# Patient Record
Sex: MALE | Race: WHITE | Employment: FULL TIME | URBAN - METROPOLITAN AREA
[De-identification: names, ages, dates, MRNs, and addresses within clinical notes are randomized per-mention and may not be internally consistent; named-entity substitution may affect disease eponyms.]

---

## 2018-02-21 LAB
CHOLEST SERPL-MCNC: 230 MG/DL (ref 100–199)
CHOLEST/HDLC SERPL: 4.8 {RATIO} (ref 0–5)
GLUCOSE SERPL-MCNC: 94 MG/DL (ref 65–99)
HBA1C MFR BLD: 5.4 % (ref 4.8–5.6)
HDLC SERPL-MCNC: 48 MG/DL
LDLC SERPL CALC-MCNC: 149 MG/DL (ref 0–99)
TRIGL SERPL-MCNC: 164 MG/DL (ref 0–149)
VLDLC SERPL CALC-MCNC: 33 MG/DL (ref 5–40)

## 2021-09-20 ENCOUNTER — APPOINTMENT (OUTPATIENT)
Dept: CT IMAGING | Age: 46
DRG: 177 | End: 2021-09-20
Attending: EMERGENCY MEDICINE
Payer: COMMERCIAL

## 2021-09-20 ENCOUNTER — APPOINTMENT (OUTPATIENT)
Dept: GENERAL RADIOLOGY | Age: 46
DRG: 177 | End: 2021-09-20
Attending: STUDENT IN AN ORGANIZED HEALTH CARE EDUCATION/TRAINING PROGRAM
Payer: COMMERCIAL

## 2021-09-20 ENCOUNTER — HOSPITAL ENCOUNTER (INPATIENT)
Age: 46
LOS: 1 days | Discharge: ANOTHER HOSPITAL | DRG: 177 | End: 2021-09-21
Attending: STUDENT IN AN ORGANIZED HEALTH CARE EDUCATION/TRAINING PROGRAM | Admitting: INTERNAL MEDICINE
Payer: COMMERCIAL

## 2021-09-20 DIAGNOSIS — R09.02 HYPOXIA: Primary | ICD-10-CM

## 2021-09-20 DIAGNOSIS — Z20.822 SUSPECTED COVID-19 VIRUS INFECTION: ICD-10-CM

## 2021-09-20 PROBLEM — J96.01 ACUTE RESPIRATORY FAILURE WITH HYPOXIA (HCC): Status: ACTIVE | Noted: 2021-09-20

## 2021-09-20 LAB
ALBUMIN SERPL-MCNC: 3.2 G/DL (ref 3.5–5)
ALBUMIN/GLOB SERPL: 0.7 {RATIO} (ref 1.2–3.5)
ALP SERPL-CCNC: 94 U/L (ref 50–136)
ALT SERPL-CCNC: 29 U/L (ref 12–65)
ANION GAP SERPL CALC-SCNC: 8 MMOL/L (ref 7–16)
AST SERPL-CCNC: 56 U/L (ref 15–37)
BASOPHILS # BLD: 0 K/UL (ref 0–0.2)
BASOPHILS NFR BLD: 0 % (ref 0–2)
BILIRUB SERPL-MCNC: 0.5 MG/DL (ref 0.2–1.1)
BUN SERPL-MCNC: 13 MG/DL (ref 6–23)
CALCIUM SERPL-MCNC: 8.6 MG/DL (ref 8.3–10.4)
CHLORIDE SERPL-SCNC: 101 MMOL/L (ref 98–107)
CO2 SERPL-SCNC: 29 MMOL/L (ref 21–32)
COVID-19 RAPID TEST, COVR: NOT DETECTED
CREAT SERPL-MCNC: 0.7 MG/DL (ref 0.8–1.5)
DIFFERENTIAL METHOD BLD: ABNORMAL
EOSINOPHIL # BLD: 0 K/UL (ref 0–0.8)
EOSINOPHIL NFR BLD: 0 % (ref 0.5–7.8)
ERYTHROCYTE [DISTWIDTH] IN BLOOD BY AUTOMATED COUNT: 13.2 % (ref 11.9–14.6)
GLOBULIN SER CALC-MCNC: 4.4 G/DL (ref 2.3–3.5)
GLUCOSE SERPL-MCNC: 119 MG/DL (ref 65–100)
HCT VFR BLD AUTO: 49 % (ref 41.1–50.3)
HGB BLD-MCNC: 16.3 G/DL (ref 13.6–17.2)
IMM GRANULOCYTES # BLD AUTO: 0 K/UL (ref 0–0.5)
IMM GRANULOCYTES NFR BLD AUTO: 0 % (ref 0–5)
LYMPHOCYTES # BLD: 0.8 K/UL (ref 0.5–4.6)
LYMPHOCYTES NFR BLD: 9 % (ref 13–44)
MCH RBC QN AUTO: 28.1 PG (ref 26.1–32.9)
MCHC RBC AUTO-ENTMCNC: 33.3 G/DL (ref 31.4–35)
MCV RBC AUTO: 84.5 FL (ref 79.6–97.8)
MONOCYTES # BLD: 0.8 K/UL (ref 0.1–1.3)
MONOCYTES NFR BLD: 10 % (ref 4–12)
NEUTS SEG # BLD: 6.6 K/UL (ref 1.7–8.2)
NEUTS SEG NFR BLD: 80 % (ref 43–78)
NRBC # BLD: 0 K/UL (ref 0–0.2)
PLATELET # BLD AUTO: 218 K/UL (ref 150–450)
PMV BLD AUTO: 9.6 FL (ref 9.4–12.3)
POTASSIUM SERPL-SCNC: 3.8 MMOL/L (ref 3.5–5.1)
PROT SERPL-MCNC: 7.6 G/DL (ref 6.3–8.2)
RBC # BLD AUTO: 5.8 M/UL (ref 4.23–5.6)
SODIUM SERPL-SCNC: 138 MMOL/L (ref 136–145)
SOURCE, COVRS: NORMAL
WBC # BLD AUTO: 8.2 K/UL (ref 4.3–11.1)

## 2021-09-20 PROCEDURE — 85025 COMPLETE CBC W/AUTO DIFF WBC: CPT

## 2021-09-20 PROCEDURE — 99284 EMERGENCY DEPT VISIT MOD MDM: CPT

## 2021-09-20 PROCEDURE — 71046 X-RAY EXAM CHEST 2 VIEWS: CPT

## 2021-09-20 PROCEDURE — 86140 C-REACTIVE PROTEIN: CPT

## 2021-09-20 PROCEDURE — 80053 COMPREHEN METABOLIC PANEL: CPT

## 2021-09-20 PROCEDURE — 84145 PROCALCITONIN (PCT): CPT

## 2021-09-20 PROCEDURE — 74011250636 HC RX REV CODE- 250/636: Performed by: STUDENT IN AN ORGANIZED HEALTH CARE EDUCATION/TRAINING PROGRAM

## 2021-09-20 PROCEDURE — 65270000029 HC RM PRIVATE

## 2021-09-20 PROCEDURE — 74011000636 HC RX REV CODE- 636: Performed by: STUDENT IN AN ORGANIZED HEALTH CARE EDUCATION/TRAINING PROGRAM

## 2021-09-20 PROCEDURE — 0202U NFCT DS 22 TRGT SARS-COV-2: CPT

## 2021-09-20 PROCEDURE — 71260 CT THORAX DX C+: CPT

## 2021-09-20 PROCEDURE — 87635 SARS-COV-2 COVID-19 AMP PRB: CPT

## 2021-09-20 PROCEDURE — 74011000258 HC RX REV CODE- 258: Performed by: STUDENT IN AN ORGANIZED HEALTH CARE EDUCATION/TRAINING PROGRAM

## 2021-09-20 RX ORDER — SODIUM CHLORIDE 0.9 % (FLUSH) 0.9 %
10 SYRINGE (ML) INJECTION
Status: COMPLETED | OUTPATIENT
Start: 2021-09-20 | End: 2021-09-20

## 2021-09-20 RX ORDER — DEXAMETHASONE SODIUM PHOSPHATE 100 MG/10ML
10 INJECTION INTRAMUSCULAR; INTRAVENOUS
Status: COMPLETED | OUTPATIENT
Start: 2021-09-20 | End: 2021-09-20

## 2021-09-20 RX ORDER — SODIUM CHLORIDE 9 MG/ML
1000 INJECTION, SOLUTION INTRAVENOUS ONCE
Status: COMPLETED | OUTPATIENT
Start: 2021-09-20 | End: 2021-09-20

## 2021-09-20 RX ADMIN — SODIUM CHLORIDE 100 ML: 900 INJECTION, SOLUTION INTRAVENOUS at 20:17

## 2021-09-20 RX ADMIN — SODIUM CHLORIDE 1000 ML: 900 INJECTION, SOLUTION INTRAVENOUS at 21:38

## 2021-09-20 RX ADMIN — DEXAMETHASONE SODIUM PHOSPHATE 10 MG: 10 INJECTION INTRAMUSCULAR; INTRAVENOUS at 22:30

## 2021-09-20 RX ADMIN — Medication 10 ML: at 20:17

## 2021-09-20 RX ADMIN — IOPAMIDOL 100 ML: 755 INJECTION, SOLUTION INTRAVENOUS at 20:17

## 2021-09-20 NOTE — ED TRIAGE NOTES
Patient is concerned for COVID 19, patient advises that he started with some shortness of breath on 9/16, fever. Patient advises it hurts to take a deep breath however none if he tried to take shallow breaths. Mask on during triage.

## 2021-09-20 NOTE — ED NOTES
40 y.o. M with concern for covid and SOB. Sx onset last Wednesday but SOB began Thursday. [facility] but has not received result. SpO2 90-92% in triage. No medical hx. Patient evaluated initially in triage. Rapid Medical Evaluation was conducted and necessary orders have been placed. I have performed a medical screening exam.  Care will now be transferred to the attending physician in the emergency department.   Overton Eisenmenger, NP 6:08 PM

## 2021-09-21 ENCOUNTER — HOSPITAL ENCOUNTER (INPATIENT)
Age: 46
LOS: 14 days | Discharge: HOME | DRG: 177 | End: 2021-10-05
Attending: FAMILY MEDICINE | Admitting: HOSPITALIST
Payer: COMMERCIAL

## 2021-09-21 VITALS
BODY MASS INDEX: 34.07 KG/M2 | TEMPERATURE: 98.8 F | SYSTOLIC BLOOD PRESSURE: 127 MMHG | OXYGEN SATURATION: 92 % | DIASTOLIC BLOOD PRESSURE: 72 MMHG | RESPIRATION RATE: 20 BRPM | HEIGHT: 69 IN | HEART RATE: 99 BPM | WEIGHT: 230 LBS

## 2021-09-21 DIAGNOSIS — F41.9 ANXIETY: Primary | ICD-10-CM

## 2021-09-21 DIAGNOSIS — U07.1 PNEUMONIA DUE TO COVID-19 VIRUS: ICD-10-CM

## 2021-09-21 DIAGNOSIS — R00.0 TACHYCARDIA: ICD-10-CM

## 2021-09-21 DIAGNOSIS — J96.01 ACUTE RESPIRATORY FAILURE WITH HYPOXIA (HCC): ICD-10-CM

## 2021-09-21 DIAGNOSIS — J12.82 PNEUMONIA DUE TO COVID-19 VIRUS: ICD-10-CM

## 2021-09-21 LAB
B PERT DNA SPEC QL NAA+PROBE: NOT DETECTED
BORDETELLA PARAPERTUSSIS PCR, BORPAR: NOT DETECTED
C PNEUM DNA SPEC QL NAA+PROBE: NOT DETECTED
CRP SERPL-MCNC: 19.9 MG/DL (ref 0–0.9)
FLUAV SUBTYP SPEC NAA+PROBE: NOT DETECTED
FLUBV RNA SPEC QL NAA+PROBE: NOT DETECTED
HADV DNA SPEC QL NAA+PROBE: NOT DETECTED
HCOV 229E RNA SPEC QL NAA+PROBE: NOT DETECTED
HCOV HKU1 RNA SPEC QL NAA+PROBE: NOT DETECTED
HCOV NL63 RNA SPEC QL NAA+PROBE: NOT DETECTED
HCOV OC43 RNA SPEC QL NAA+PROBE: NOT DETECTED
HMPV RNA SPEC QL NAA+PROBE: NOT DETECTED
HPIV1 RNA SPEC QL NAA+PROBE: NOT DETECTED
HPIV2 RNA SPEC QL NAA+PROBE: NOT DETECTED
HPIV3 RNA SPEC QL NAA+PROBE: NOT DETECTED
HPIV4 RNA SPEC QL NAA+PROBE: NOT DETECTED
M PNEUMO DNA SPEC QL NAA+PROBE: NOT DETECTED
PROCALCITONIN SERPL-MCNC: <0.05 NG/ML
PROCALCITONIN SERPL-MCNC: <0.05 NG/ML
RSV RNA SPEC QL NAA+PROBE: NOT DETECTED
RV+EV RNA SPEC QL NAA+PROBE: NOT DETECTED
SARS-COV-2 PCR, COVPCR: DETECTED

## 2021-09-21 PROCEDURE — 36415 COLL VENOUS BLD VENIPUNCTURE: CPT

## 2021-09-21 PROCEDURE — 65270000029 HC RM PRIVATE

## 2021-09-21 PROCEDURE — 74011000258 HC RX REV CODE- 258: Performed by: HOSPITALIST

## 2021-09-21 PROCEDURE — 74011000250 HC RX REV CODE- 250: Performed by: HOSPITALIST

## 2021-09-21 PROCEDURE — XW033E5 INTRODUCTION OF REMDESIVIR ANTI-INFECTIVE INTO PERIPHERAL VEIN, PERCUTANEOUS APPROACH, NEW TECHNOLOGY GROUP 5: ICD-10-PCS | Performed by: INTERNAL MEDICINE

## 2021-09-21 PROCEDURE — 74011250636 HC RX REV CODE- 250/636: Performed by: INTERNAL MEDICINE

## 2021-09-21 PROCEDURE — 84145 PROCALCITONIN (PCT): CPT

## 2021-09-21 PROCEDURE — XW033E5 INTRODUCTION OF REMDESIVIR ANTI-INFECTIVE INTO PERIPHERAL VEIN, PERCUTANEOUS APPROACH, NEW TECHNOLOGY GROUP 5: ICD-10-PCS | Performed by: HOSPITALIST

## 2021-09-21 PROCEDURE — 74011250637 HC RX REV CODE- 250/637: Performed by: INTERNAL MEDICINE

## 2021-09-21 PROCEDURE — 74011000250 HC RX REV CODE- 250: Performed by: INTERNAL MEDICINE

## 2021-09-21 PROCEDURE — 74011000258 HC RX REV CODE- 258: Performed by: INTERNAL MEDICINE

## 2021-09-21 PROCEDURE — 74011250637 HC RX REV CODE- 250/637: Performed by: HOSPITALIST

## 2021-09-21 RX ORDER — DEXAMETHASONE 4 MG/1
6 TABLET ORAL DAILY
Status: DISCONTINUED | OUTPATIENT
Start: 2021-09-21 | End: 2021-09-21 | Stop reason: HOSPADM

## 2021-09-21 RX ORDER — ACETAMINOPHEN 650 MG/1
650 SUPPOSITORY RECTAL
Status: DISCONTINUED | OUTPATIENT
Start: 2021-09-21 | End: 2021-09-21 | Stop reason: HOSPADM

## 2021-09-21 RX ORDER — ENOXAPARIN SODIUM 100 MG/ML
30 INJECTION SUBCUTANEOUS DAILY
Status: DISCONTINUED | OUTPATIENT
Start: 2021-09-22 | End: 2021-09-22

## 2021-09-21 RX ORDER — FAMOTIDINE 20 MG/1
20 TABLET, FILM COATED ORAL 2 TIMES DAILY
Status: DISCONTINUED | OUTPATIENT
Start: 2021-09-21 | End: 2021-09-21 | Stop reason: HOSPADM

## 2021-09-21 RX ORDER — ONDANSETRON 2 MG/ML
4 INJECTION INTRAMUSCULAR; INTRAVENOUS
Status: DISCONTINUED | OUTPATIENT
Start: 2021-09-21 | End: 2021-10-05 | Stop reason: HOSPADM

## 2021-09-21 RX ORDER — SODIUM CHLORIDE 0.9 % (FLUSH) 0.9 %
5-40 SYRINGE (ML) INJECTION AS NEEDED
Status: DISCONTINUED | OUTPATIENT
Start: 2021-09-21 | End: 2021-09-21 | Stop reason: HOSPADM

## 2021-09-21 RX ORDER — ACETAMINOPHEN 325 MG/1
650 TABLET ORAL
Status: DISCONTINUED | OUTPATIENT
Start: 2021-09-21 | End: 2021-10-05 | Stop reason: HOSPADM

## 2021-09-21 RX ORDER — POLYETHYLENE GLYCOL 3350 17 G/17G
17 POWDER, FOR SOLUTION ORAL DAILY PRN
Status: DISCONTINUED | OUTPATIENT
Start: 2021-09-21 | End: 2021-09-21 | Stop reason: HOSPADM

## 2021-09-21 RX ORDER — DEXAMETHASONE SODIUM PHOSPHATE 100 MG/10ML
6 INJECTION INTRAMUSCULAR; INTRAVENOUS EVERY 24 HOURS
Status: DISCONTINUED | OUTPATIENT
Start: 2021-09-22 | End: 2021-09-30

## 2021-09-21 RX ORDER — ENOXAPARIN SODIUM 100 MG/ML
40 INJECTION SUBCUTANEOUS EVERY 12 HOURS
Status: DISCONTINUED | OUTPATIENT
Start: 2021-09-21 | End: 2021-09-21 | Stop reason: HOSPADM

## 2021-09-21 RX ORDER — SODIUM CHLORIDE 0.9 % (FLUSH) 0.9 %
5-40 SYRINGE (ML) INJECTION EVERY 8 HOURS
Status: DISCONTINUED | OUTPATIENT
Start: 2021-09-21 | End: 2021-10-05 | Stop reason: HOSPADM

## 2021-09-21 RX ORDER — ONDANSETRON 4 MG/1
4 TABLET, ORALLY DISINTEGRATING ORAL
Status: DISCONTINUED | OUTPATIENT
Start: 2021-09-21 | End: 2021-10-05 | Stop reason: HOSPADM

## 2021-09-21 RX ORDER — SODIUM CHLORIDE 0.9 % (FLUSH) 0.9 %
5-40 SYRINGE (ML) INJECTION EVERY 8 HOURS
Status: DISCONTINUED | OUTPATIENT
Start: 2021-09-21 | End: 2021-09-21 | Stop reason: HOSPADM

## 2021-09-21 RX ORDER — ONDANSETRON 2 MG/ML
4 INJECTION INTRAMUSCULAR; INTRAVENOUS
Status: DISCONTINUED | OUTPATIENT
Start: 2021-09-21 | End: 2021-09-21 | Stop reason: HOSPADM

## 2021-09-21 RX ORDER — ACETAMINOPHEN 325 MG/1
650 TABLET ORAL
Status: DISCONTINUED | OUTPATIENT
Start: 2021-09-21 | End: 2021-09-21 | Stop reason: HOSPADM

## 2021-09-21 RX ORDER — ACETAMINOPHEN 650 MG/1
650 SUPPOSITORY RECTAL
Status: DISCONTINUED | OUTPATIENT
Start: 2021-09-21 | End: 2021-10-05 | Stop reason: HOSPADM

## 2021-09-21 RX ORDER — SODIUM CHLORIDE 0.9 % (FLUSH) 0.9 %
5-40 SYRINGE (ML) INJECTION AS NEEDED
Status: DISCONTINUED | OUTPATIENT
Start: 2021-09-21 | End: 2021-10-05 | Stop reason: HOSPADM

## 2021-09-21 RX ORDER — POLYETHYLENE GLYCOL 3350 17 G/17G
17 POWDER, FOR SOLUTION ORAL DAILY PRN
Status: DISCONTINUED | OUTPATIENT
Start: 2021-09-21 | End: 2021-10-05 | Stop reason: HOSPADM

## 2021-09-21 RX ORDER — ONDANSETRON 4 MG/1
4 TABLET, ORALLY DISINTEGRATING ORAL
Status: DISCONTINUED | OUTPATIENT
Start: 2021-09-21 | End: 2021-09-21 | Stop reason: HOSPADM

## 2021-09-21 RX ADMIN — REMDESIVIR 200 MG: 100 INJECTION, POWDER, LYOPHILIZED, FOR SOLUTION INTRAVENOUS at 03:17

## 2021-09-21 RX ADMIN — ENOXAPARIN SODIUM 40 MG: 40 INJECTION SUBCUTANEOUS at 08:12

## 2021-09-21 RX ADMIN — Medication 10 ML: at 21:18

## 2021-09-21 RX ADMIN — DEXAMETHASONE 6 MG: 4 TABLET ORAL at 08:10

## 2021-09-21 RX ADMIN — FAMOTIDINE 20 MG: 20 TABLET, FILM COATED ORAL at 08:10

## 2021-09-21 RX ADMIN — REMDESIVIR 200 MG: 100 INJECTION, POWDER, LYOPHILIZED, FOR SOLUTION INTRAVENOUS at 21:17

## 2021-09-21 RX ADMIN — ACETAMINOPHEN 650 MG: 325 TABLET ORAL at 19:25

## 2021-09-21 NOTE — PROGRESS NOTES
TRANSFER - IN REPORT:    Verbal report received from rick(name) on Chandra Roger.  being received from [facility](unit) for routine progression of care      Report consisted of patients Situation, Background, Assessment and   Recommendations(SBAR). Information from the following report(s) ED Summary was reviewed with the receiving nurse. Opportunity for questions and clarification was provided. Assessment completed upon patients arrival to unit and care assumed.  awaiing arrival, report to Signdat RN

## 2021-09-21 NOTE — PROGRESS NOTES
TRANSFER - IN REPORT:    Verbal report received from [facility] on [facility].  being received from  ER for routine progression of care      Report consisted of patients Situation, Background, Assessment and   Recommendations(SBAR). Information from the following report(s) SBAR, Kardex, ED Summary, MAR, Recent Results and Med Rec Status was reviewed with the receiving nurse. Opportunity for questions and clarification was provided. Assessment completed upon patients arrival to unit and care assumed. Patient arrived to room 802 from  ER. Pt resting on 4 L NC. resp even and unlabored.

## 2021-09-21 NOTE — PROGRESS NOTES
This patient is moved from [facility] emergency room to [facility] for [employer], orders were initiated.   I personally did not see this patient, H&P is done by Dr. Adia Hutchinson.

## 2021-09-21 NOTE — H&P
HOSPITALIST H&P/CONSULT  NAME:  Terrence Calzada. Age:  40 y.o.  :   1975   MRN:   [id number]  PCP: Sherri Barrera MD  Consulting MD:  Treatment Team: Attending Provider: Jessica Henson DO; Primary Nurse: Nani Caballero RN  HPI:     41 yo CM with no past medical history presents with a 5 day history of worsening cough and shortness of breath. He went to [facility] earlier today and was swabbed for COVID but does not know the results. No known COVID contacts. He has had some mild nausea but denies any other GI issues including diarrhea. No joint or muscle aches. Denies fevers/chills. Denies loss of taste/smell. Has not been vaccinated. Denies chest pain. Has not noticed any swelling or redness in his legs. PMHx: none  PSHx; reviewed and noncontributory  Social Hx:  reviewed and noncontributory  Family Hx:  reviewed and noncontributory    ED course: Rapid COVID is negative but RPP is positive. CXR is normal. CT chest negative for PE but does show bilateral infiltrates. Given fluid bolus, Decadron 10 mg IV once. Desatted to high 80s on RA, placed on NC to maintain O2 levels. Hospitalist consulted for admission. Complete ROS done and is as stated in HPI or otherwise negative  History reviewed. No pertinent past medical history. Past Surgical History:   Procedure Laterality Date    HX APPENDECTOMY        None     Allergies   Allergen Reactions    Pcn [Penicillins] Hives      Social History     Tobacco Use    Smoking status: Former Smoker    Smokeless tobacco: Current User    Tobacco comment: vapes   Substance Use Topics    Alcohol use: Not Currently      History reviewed. No pertinent family history.    Objective:     Visit Vitals  /77   Pulse 98   Temp 98.8 °F (37.1 °C)   Resp 16   Ht 5' 9\" (1.753 m)   Wt 104.3 kg (230 lb)   SpO2 96%   BMI 33.97 kg/m²      Temp (24hrs), Av.8 °F (37.1 °C), Min:98.8 °F (37.1 °C), Max:98.8 °F (37.1 °C)    Oxygen Therapy  O2 Sat (%): 96 % (09/21/21 0109)  Pulse via Oximetry: 97 beats per minute (09/21/21 0109)  O2 Device: Nasal cannula (09/20/21 2151)  O2 Flow Rate (L/min): 4 l/min (09/21/21 0109)  Physical Exam:  General:    Alert, cooperative, no distress, appears stated age. Head:   Normocephalic, without obvious abnormality, atraumatic. Nose:  Nares normal. No drainage or sinus tenderness. Lungs:   Clear to auscultation bilaterally. No Wheezing or Rhonchi. No rales. Productive cough. On NC  Heart:   Regular rate and rhythm,  +S1, +S2. Abdomen:   Soft, non-tender. Not distended. Bowel sounds normal.   Extremities: No cyanosis. No edema. No clubbing  Skin:     Texture, turgor normal. No rashes or lesions. Not Jaundiced  Neurologic: Alert and oriented x 3, no focal deficits   Data Review:   Recent Results (from the past 24 hour(s))   COVID-19 RAPID TEST    Collection Time: 09/20/21  6:10 PM   Result Value Ref Range    Specimen source Nasopharyngeal      COVID-19 rapid test Not detected NOTD     CBC WITH AUTOMATED DIFF    Collection Time: 09/20/21  6:23 PM   Result Value Ref Range    WBC 8.2 4.3 - 11.1 K/uL    RBC 5.80 (H) 4.23 - 5.6 M/uL    HGB 16.3 13.6 - 17.2 g/dL    HCT 49.0 41.1 - 50.3 %    MCV 84.5 79.6 - 97.8 FL    MCH 28.1 26.1 - 32.9 PG    MCHC 33.3 31.4 - 35.0 g/dL    RDW 13.2 11.9 - 14.6 %    PLATELET 515 447 - 536 K/uL    MPV 9.6 9.4 - 12.3 FL    ABSOLUTE NRBC 0.00 0.0 - 0.2 K/uL    DF AUTOMATED      NEUTROPHILS 80 (H) 43 - 78 %    LYMPHOCYTES 9 (L) 13 - 44 %    MONOCYTES 10 4.0 - 12.0 %    EOSINOPHILS 0 (L) 0.5 - 7.8 %    BASOPHILS 0 0.0 - 2.0 %    IMMATURE GRANULOCYTES 0 0.0 - 5.0 %    ABS. NEUTROPHILS 6.6 1.7 - 8.2 K/UL    ABS. LYMPHOCYTES 0.8 0.5 - 4.6 K/UL    ABS. MONOCYTES 0.8 0.1 - 1.3 K/UL    ABS. EOSINOPHILS 0.0 0.0 - 0.8 K/UL    ABS. BASOPHILS 0.0 0.0 - 0.2 K/UL    ABS. IMM.  GRANS. 0.0 0.0 - 0.5 K/UL   METABOLIC PANEL, COMPREHENSIVE    Collection Time: 09/20/21  6:23 PM   Result Value Ref Range    Sodium 138 136 - 145 mmol/L    Potassium 3.8 3.5 - 5.1 mmol/L    Chloride 101 98 - 107 mmol/L    CO2 29 21 - 32 mmol/L    Anion gap 8 7 - 16 mmol/L    Glucose 119 (H) 65 - 100 mg/dL    BUN 13 6 - 23 MG/DL    Creatinine 0.70 (L) 0.8 - 1.5 MG/DL    GFR est AA >60 >60 ml/min/1.73m2    GFR est non-AA >60 >60 ml/min/1.73m2    Calcium 8.6 8.3 - 10.4 MG/DL    Bilirubin, total 0.5 0.2 - 1.1 MG/DL    ALT (SGPT) 29 12 - 65 U/L    AST (SGOT) 56 (H) 15 - 37 U/L    Alk. phosphatase 94 50 - 136 U/L    Protein, total 7.6 6.3 - 8.2 g/dL    Albumin 3.2 (L) 3.5 - 5.0 g/dL    Globulin 4.4 (H) 2.3 - 3.5 g/dL    A-G Ratio 0.7 (L) 1.2 - 3.5     RESPIRATORY VIRUS PANEL W/COVID-19, PCR    Collection Time: 09/20/21  9:44 PM    Specimen: Nasopharyngeal   Result Value Ref Range    Adenovirus NOT DETECTED NOTDET      Coronavirus 229E NOT DETECTED NOTDET      Coronavirus HKU1 NOT DETECTED NOTDET      Coronavirus CVNL63 NOT DETECTED NOTDET      Coronavirus OC43 NOT DETECTED NOTDET      SARS-CoV-2, PCR Detected (A) NOTDET      Metapneumovirus NOT DETECTED NOTDET      Rhinovirus and Enterovirus NOT DETECTED NOTDET      Influenza A NOT DETECTED NOTDET      Influenza B NOT DETECTED NOTDET      Parainfluenza 1 NOT DETECTED NOTDET      Parainfluenza 2 NOT DETECTED NOTDET      Parainfluenza 3 NOT DETECTED NOTDET      Parainfluenza virus 4 NOT DETECTED NOTDET      RSV by PCR NOT DETECTED NOTDET      B. parapertussis, PCR NOT DETECTED NOTDET      Bordetella pertussis - PCR NOT DETECTED NOTDET      Chlamydophila pneumoniae DNA, QL, PCR NOT DETECTED NOTDET      Mycoplasma pneumoniae DNA, QL, PCR NOT DETECTED NOTDET       Imaging /Procedures /Studies     Assessment and Plan:      Active Hospital Problems    Diagnosis Date Noted    COVID-19 09/21/2021    Acute respiratory failure with hypoxia ([address] 09/20/2021       PLAN    # Acute hypoxic respiratory failure due to COVID  - Decadron 6 mg qdaily  - ordered remdesivir  - monitor renal/hepatic function  - check CRP, procalcitonin  - CT chest negative for PE  - wean supplemental oxygen as tolerated    F/E/N: no fluids, replete electrolytes as needed, regular diet    Ppx: Lovenox BID for VTE    Code Status: FULL CODE    Disposition: Admit to Inpatient with plan as above, will need to go to COVID floor unit at [facility] when bed is available. Discussed with patient at bedside. All questions answered.      Signed By: Liliana Arias DO     September 21, 2021

## 2021-09-21 NOTE — ED PROVIDER NOTES
78-year-old male presents to the emergency department with cough and worsening shortness of breath. Patient reports having nausea and vomiting which began in the middle of last week, that has since resolved but he has significantly decreased p.o. intake. Denies diarrhea. Denies abdominal pain. Denies fever chills. Has not been vaccinated. Has no known COVID-19 exposure. Denies chest pain. Reports a dry cough worse with taking deep breath. Denies loss of taste or smell. Denies headache. History reviewed. No pertinent past medical history. Past Surgical History:   Procedure Laterality Date    HX APPENDECTOMY           History reviewed. No pertinent family history. Social History     Socioeconomic History    Marital status:      Spouse name: Not on file    Number of children: Not on file    Years of education: Not on file    Highest education level: Not on file   Occupational History    Not on file   Tobacco Use    Smoking status: Former Smoker    Smokeless tobacco: Current User    Tobacco comment: vapes   Substance and Sexual Activity    Alcohol use: Not Currently    Drug use: Never    Sexual activity: Not on file   Other Topics Concern    Not on file   Social History Narrative    Not on file     Social Determinants of Health     Financial Resource Strain:     Difficulty of Paying Living Expenses:    Food Insecurity:     Worried About Running Out of Food in the Last Year:     [address] in the Last Year:    Transportation Needs:     Lack of Transportation (Medical):      Lack of Transportation (Non-Medical):    Physical Activity:     Days of Exercise per Week:     Minutes of Exercise per Session:    Stress:     Feeling of Stress :    Social Connections:     Frequency of Communication with Friends and Family:     Frequency of Social Gatherings with Friends and Family:     Attends Confucianism Services:     Active Member of Clubs or Organizations:     Attends Club or Organization Meetings:     Marital Status:    Intimate Partner Violence:     Fear of Current or Ex-Partner:     Emotionally Abused:     Physically Abused:     Sexually Abused: ALLERGIES: Pcn [penicillins]    Review of Systems   Constitutional: Positive for appetite change. Negative for chills and fever. HENT: Negative for congestion and sore throat. Eyes: Negative for visual disturbance. Respiratory: Positive for cough and shortness of breath. Cardiovascular: Negative for chest pain. Gastrointestinal: Negative for abdominal pain, diarrhea, nausea and vomiting. Endocrine: Negative for polyuria. Genitourinary: Positive for decreased urine volume. Negative for dysuria. Musculoskeletal: Negative for neck pain and neck stiffness. Skin: Negative for rash. Neurological: Negative for weakness and headaches. All other systems reviewed and are negative. Vitals:    09/20/21 1804   BP: 121/72   Pulse: (!) 125   Resp: 16   Temp: 98.8 °F (37.1 °C)   SpO2: 92%   Weight: 104.3 kg (230 lb)   Height: 5' 9\" (1.753 m)            Physical Exam  Vitals and nursing note reviewed. Constitutional:       Appearance: Normal appearance. HENT:      Head: Normocephalic and atraumatic. Nose: Nose normal.      Mouth/Throat:      Mouth: Mucous membranes are moist.   Eyes:      Extraocular Movements: Extraocular movements intact. Cardiovascular:      Rate and Rhythm: Regular rhythm. Tachycardia present. Heart sounds: Normal heart sounds. Pulmonary:      Effort: Pulmonary effort is normal.      Breath sounds: Normal breath sounds. No wheezing, rhonchi or rales. Abdominal:      General: Abdomen is flat. Palpations: Abdomen is soft. Tenderness: There is no abdominal tenderness. There is no guarding. Musculoskeletal:         General: Normal range of motion. Cervical back: Normal range of motion. Skin:     General: Skin is warm and dry. Neurological:      General: No focal deficit present. Mental Status: He is alert and oriented to person, place, and time. Psychiatric:         Mood and Affect: Mood normal.          MDM  Number of Diagnoses or Management Options  Hypoxia  Suspected COVID-19 virus infection  Diagnosis management comments: 76-year-old male presents the ER with concern for COVID-19. Patient arrives with a heart rate in 120s, O2 saturation 90-92% at rest.  Patient with dry mucous membranes, will give 1 L bolus IV fluid. Normal white count, stable H&H, normal electrolytes and normal kidney function, Mildly elevated AST, rapid Covid negative, CT patient's chest to rule out PE shows no evidence of PE, evidence for bilateral Covid pneumonia. Will obtain respiratory viral panel. Once patient placed in room, O2 saturation reevaluated, O2 sat remained 89 percent on room air with increased work of breathing at rest.  Currently on 3 L via nasal cannula. Patient given IV Decadron with concern for likely positive COVID-19. Hospitalist consulted who agreed with admission. Patient voiced understanding and agreement.        Amount and/or Complexity of Data Reviewed  Clinical lab tests: ordered and reviewed  Tests in the radiology section of CPT®: ordered and reviewed  Discussion of test results with the performing providers: yes  Discuss the patient with other providers: yes    Risk of Complications, Morbidity, and/or Mortality  Presenting problems: moderate  Diagnostic procedures: moderate  Management options: moderate           Procedures

## 2021-09-22 LAB
ALBUMIN SERPL-MCNC: 2.6 G/DL (ref 3.5–5)
ALBUMIN/GLOB SERPL: 0.7 {RATIO} (ref 1.2–3.5)
ALP SERPL-CCNC: 91 U/L (ref 50–136)
ALT SERPL-CCNC: 25 U/L (ref 12–65)
ANION GAP SERPL CALC-SCNC: 9 MMOL/L (ref 7–16)
AST SERPL-CCNC: 46 U/L (ref 15–37)
BASOPHILS # BLD: 0 K/UL (ref 0–0.2)
BASOPHILS NFR BLD: 0 % (ref 0–2)
BILIRUB SERPL-MCNC: 0.5 MG/DL (ref 0.2–1.1)
BUN SERPL-MCNC: 23 MG/DL (ref 6–23)
CALCIUM SERPL-MCNC: 8.4 MG/DL (ref 8.3–10.4)
CHLORIDE SERPL-SCNC: 108 MMOL/L (ref 98–107)
CO2 SERPL-SCNC: 26 MMOL/L (ref 21–32)
CREAT SERPL-MCNC: 0.62 MG/DL (ref 0.8–1.5)
CRP SERPL-MCNC: 8 MG/DL (ref 0–0.9)
D DIMER PPP FEU-MCNC: 0.63 UG/ML(FEU)
DIFFERENTIAL METHOD BLD: ABNORMAL
EOSINOPHIL # BLD: 0 K/UL (ref 0–0.8)
EOSINOPHIL NFR BLD: 0 % (ref 0.5–7.8)
ERYTHROCYTE [DISTWIDTH] IN BLOOD BY AUTOMATED COUNT: 13.6 % (ref 11.9–14.6)
GLOBULIN SER CALC-MCNC: 4 G/DL (ref 2.3–3.5)
GLUCOSE SERPL-MCNC: 89 MG/DL (ref 65–100)
HCT VFR BLD AUTO: 46.2 % (ref 41.1–50.3)
HGB BLD-MCNC: 15.2 G/DL (ref 13.6–17.2)
IMM GRANULOCYTES # BLD AUTO: 0 K/UL (ref 0–0.5)
IMM GRANULOCYTES NFR BLD AUTO: 1 % (ref 0–5)
LYMPHOCYTES # BLD: 0.8 K/UL (ref 0.5–4.6)
LYMPHOCYTES NFR BLD: 9 % (ref 13–44)
MAGNESIUM SERPL-MCNC: 2.5 MG/DL (ref 1.8–2.4)
MCH RBC QN AUTO: 28.4 PG (ref 26.1–32.9)
MCHC RBC AUTO-ENTMCNC: 32.9 G/DL (ref 31.4–35)
MCV RBC AUTO: 86.2 FL (ref 79.6–97.8)
MONOCYTES # BLD: 1 K/UL (ref 0.1–1.3)
MONOCYTES NFR BLD: 12 % (ref 4–12)
NEUTS SEG # BLD: 6.5 K/UL (ref 1.7–8.2)
NEUTS SEG NFR BLD: 78 % (ref 43–78)
NRBC # BLD: 0 K/UL (ref 0–0.2)
PLATELET # BLD AUTO: 279 K/UL (ref 150–450)
PMV BLD AUTO: 9.5 FL (ref 9.4–12.3)
POTASSIUM SERPL-SCNC: 3.9 MMOL/L (ref 3.5–5.1)
PROT SERPL-MCNC: 6.6 G/DL (ref 6.3–8.2)
RBC # BLD AUTO: 5.36 M/UL (ref 4.23–5.6)
SODIUM SERPL-SCNC: 143 MMOL/L (ref 138–145)
WBC # BLD AUTO: 8.3 K/UL (ref 4.3–11.1)

## 2021-09-22 PROCEDURE — 80053 COMPREHEN METABOLIC PANEL: CPT

## 2021-09-22 PROCEDURE — 94760 N-INVAS EAR/PLS OXIMETRY 1: CPT

## 2021-09-22 PROCEDURE — 77010033678 HC OXYGEN DAILY

## 2021-09-22 PROCEDURE — 74011000250 HC RX REV CODE- 250: Performed by: HOSPITALIST

## 2021-09-22 PROCEDURE — 36415 COLL VENOUS BLD VENIPUNCTURE: CPT

## 2021-09-22 PROCEDURE — 74011250636 HC RX REV CODE- 250/636: Performed by: HOSPITALIST

## 2021-09-22 PROCEDURE — 74011000258 HC RX REV CODE- 258: Performed by: HOSPITALIST

## 2021-09-22 PROCEDURE — 86140 C-REACTIVE PROTEIN: CPT

## 2021-09-22 PROCEDURE — 74011250637 HC RX REV CODE- 250/637: Performed by: HOSPITALIST

## 2021-09-22 PROCEDURE — 83735 ASSAY OF MAGNESIUM: CPT

## 2021-09-22 PROCEDURE — 85379 FIBRIN DEGRADATION QUANT: CPT

## 2021-09-22 PROCEDURE — 85025 COMPLETE CBC W/AUTO DIFF WBC: CPT

## 2021-09-22 PROCEDURE — 3E0333Z INTRODUCTION OF ANTI-INFLAMMATORY INTO PERIPHERAL VEIN, PERCUTANEOUS APPROACH: ICD-10-PCS | Performed by: HOSPITALIST

## 2021-09-22 PROCEDURE — 65270000029 HC RM PRIVATE

## 2021-09-22 RX ORDER — ENOXAPARIN SODIUM 100 MG/ML
40 INJECTION SUBCUTANEOUS DAILY
Status: DISCONTINUED | OUTPATIENT
Start: 2021-09-23 | End: 2021-10-05 | Stop reason: HOSPADM

## 2021-09-22 RX ADMIN — REMDESIVIR 100 MG: 100 INJECTION, POWDER, LYOPHILIZED, FOR SOLUTION INTRAVENOUS at 21:03

## 2021-09-22 RX ADMIN — DEXAMETHASONE SODIUM PHOSPHATE 6 MG: 10 INJECTION INTRAMUSCULAR; INTRAVENOUS at 09:05

## 2021-09-22 RX ADMIN — Medication 10 ML: at 13:07

## 2021-09-22 RX ADMIN — ACETAMINOPHEN 650 MG: 325 TABLET ORAL at 06:19

## 2021-09-22 RX ADMIN — Medication 10 ML: at 06:19

## 2021-09-22 RX ADMIN — ENOXAPARIN SODIUM 30 MG: 30 INJECTION SUBCUTANEOUS at 09:05

## 2021-09-22 NOTE — PROGRESS NOTES
Quiet in bed  Says can tell it is more difficult to breath  Oxygen is now at 12L high flow  Saturation 90 to 92%

## 2021-09-22 NOTE — PROGRESS NOTES
Alert and oriented  Denies discomfort  Oxygen at 10L via cannula  Patient request that daughter be given security code so she can get information  Told patient I will call daughter and update her today

## 2021-09-22 NOTE — PROGRESS NOTES
Hospitalist Progress Note   Admit Date:  2021  6:44 PM   Name:  Enrique Hidalgo. Age:  40 y.o. Sex:  male  :  1975   MRN:  [id number]   Room:  [facility]/    Presenting Complaint: No chief complaint on file. Reason(s) for Admission: Pneumonia due to COVID-19 virus [U07.1, J12.82]     Hospital Course & Interval History:   41 yo CM with no past medical history presented with a 5 day history of worsening cough and shortness of breath. He went to [facility] prior to coming to ER and was swabbed for COVID. No known COVID contacts. He has had some mild nausea but denies any other GI issues including diarrhea. No joint or muscle aches. Denies fevers/chills. Denies loss of taste/smell. Has not been vaccinated. Denies chest pain. Has not noticed any swelling or redness in his legs. Subjective (21):  No AEO. Now on 10 Lpm.  Even and unlabored during encounter. No new complaints. Assessment & Plan:     # Acute hypoxic respiratory failure due to COVID  - Decadron 6 mg qd Day 3  - Remdesivir Day 3  - monitor renal/hepatic function  - CT chest negative for PE  - wean supplemental oxygen as tolerated   - remains on 4L NC. CRP is 19. Will repeat IN AM. May qualify for baricinitib vs actemra.   Sep/22: CRP now 8.0 from 19.9     # Obesity Class 1  - BMI 33.97      Dispo/Discharge Planning: > 2 midnights    Diet:  ADULT DIET Regular; 3 carb choices (45 gm/meal)  DVT PPx: enoxaparin  Code status: Full Code    Hospital Problems as of 2021 Never Reviewed        Codes Class Noted - Resolved POA    * (Principal) Pneumonia due to COVID-19 virus ICD-10-CM: U07.1, J12.82  ICD-9-CM: 480.8, 079.89  2021 - Present Unknown              Objective:     Patient Vitals for the past 24 hrs:   Temp Pulse Resp BP SpO2   21 1118 98.1 °F (36.7 °C) 95 20 122/75 94 %   21 0706 97.5 °F (36.4 °C) 96 21 101/69 95 %   21 0359 97.9 °F (36.6 °C) 88 20 111/70 93 %   21 2246 98 °F (36.7 °C) 80 20 99/65 94 %   09/21/21 1939 97.7 °F (36.5 °C) (!) 102 20 119/74 92 %     Oxygen Therapy  O2 Sat (%): 94 % (09/22/21 1118)  O2 Device: Nasal cannula (09/22/21 0706)  O2 Flow Rate (L/min): 10 l/min (09/22/21 0706)    Estimated body mass index is 33.97 kg/m² as calculated from the following:    Height as of 9/20/21: 5' 9\" (1.753 m). Weight as of 9/20/21: 104.3 kg (230 lb). Intake/Output Summary (Last 24 hours) at 9/22/2021 1129  Last data filed at 9/22/2021 1009  Gross per 24 hour   Intake 360 ml   Output --   Net 360 ml         Physical Exam:   General:    No overt distress  Head:  Normocephalic, atraumatic  Eyes:  Sclerae appear normal.  Pupils equally round. ENT:  Nares appear normal, no drainage. Moist oral mucosa  Neck:  No restricted ROM. Trachea midline   CV:   RRR. No m/r/g. Lungs:   Respirations even, unlabored during encounter. On 10 lpm.  Abdomen:   Soft, nontender, nondistended. Extremities: No cyanosis or clubbing. Skin:     No rashes and normal coloration. Warm and dry. Neuro:  Cranial nerves II-XII grossly intact. Psych:  Normal mood and affect. Alert and oriented x3    I have reviewed ordered lab tests and independently visualized imaging below:    Last 24hr Labs:  Recent Results (from the past 24 hour(s))   PROCALCITONIN    Collection Time: 09/21/21  8:01 PM   Result Value Ref Range    Procalcitonin <8.33 ng/mL   METABOLIC PANEL, COMPREHENSIVE    Collection Time: 09/22/21  7:22 AM   Result Value Ref Range    Sodium 143 138 - 145 mmol/L    Potassium 3.9 3.5 - 5.1 mmol/L    Chloride 108 (H) 98 - 107 mmol/L    CO2 26 21 - 32 mmol/L    Anion gap 9 7 - 16 mmol/L    Glucose 89 65 - 100 mg/dL    BUN 23 6 - 23 MG/DL    Creatinine 0.62 (L) 0.8 - 1.5 MG/DL    GFR est AA >60 >60 ml/min/1.73m2    GFR est non-AA >60 >60 ml/min/1.73m2    Calcium 8.4 8.3 - 10.4 MG/DL    Bilirubin, total 0.5 0.2 - 1.1 MG/DL    ALT (SGPT) 25 12 - 65 U/L    AST (SGOT) 46 (H) 15 - 37 U/L    Alk. phosphatase 91 50 - 136 U/L    Protein, total 6.6 6.3 - 8.2 g/dL    Albumin 2.6 (L) 3.5 - 5.0 g/dL    Globulin 4.0 (H) 2.3 - 3.5 g/dL    A-G Ratio 0.7 (L) 1.2 - 3.5     CBC WITH AUTOMATED DIFF    Collection Time: 09/22/21  7:22 AM   Result Value Ref Range    WBC 8.3 4.3 - 11.1 K/uL    RBC 5.36 4.23 - 5.6 M/uL    HGB 15.2 13.6 - 17.2 g/dL    HCT 46.2 41.1 - 50.3 %    MCV 86.2 79.6 - 97.8 FL    MCH 28.4 26.1 - 32.9 PG    MCHC 32.9 31.4 - 35.0 g/dL    RDW 13.6 11.9 - 14.6 %    PLATELET 281 566 - 515 K/uL    MPV 9.5 9.4 - 12.3 FL    ABSOLUTE NRBC 0.00 0.0 - 0.2 K/uL    DF AUTOMATED      NEUTROPHILS 78 43 - 78 %    LYMPHOCYTES 9 (L) 13 - 44 %    MONOCYTES 12 4.0 - 12.0 %    EOSINOPHILS 0 (L) 0.5 - 7.8 %    BASOPHILS 0 0.0 - 2.0 %    IMMATURE GRANULOCYTES 1 0.0 - 5.0 %    ABS. NEUTROPHILS 6.5 1.7 - 8.2 K/UL    ABS. LYMPHOCYTES 0.8 0.5 - 4.6 K/UL    ABS. MONOCYTES 1.0 0.1 - 1.3 K/UL    ABS. EOSINOPHILS 0.0 0.0 - 0.8 K/UL    ABS. BASOPHILS 0.0 0.0 - 0.2 K/UL    ABS. IMM. GRANS. 0.0 0.0 - 0.5 K/UL   MAGNESIUM    Collection Time: 09/22/21  7:22 AM   Result Value Ref Range    Magnesium 2.5 (H) 1.8 - 2.4 mg/dL   C REACTIVE PROTEIN, QT    Collection Time: 09/22/21  7:22 AM   Result Value Ref Range    C-Reactive protein 8.0 (H) 0.0 - 0.9 mg/dL   D DIMER    Collection Time: 09/22/21  7:22 AM   Result Value Ref Range    D DIMER 0.63 (H) <0.56 ug/ml(FEU)       All Micro Results     None          Other Studies:  No results found.     Current Meds:  Current Facility-Administered Medications   Medication Dose Route Frequency    [START ON 9/23/2021] enoxaparin (LOVENOX) injection 40 mg  40 mg SubCUTAneous DAILY    remdesivir 100 mg in 0.9% sodium chloride 250 mL IVPB  100 mg IntraVENous Q24H    sodium chloride (NS) flush 5-40 mL  5-40 mL IntraVENous Q8H    sodium chloride (NS) flush 5-40 mL  5-40 mL IntraVENous PRN    acetaminophen (TYLENOL) tablet 650 mg  650 mg Oral Q6H PRN    Or    acetaminophen (TYLENOL) suppository 650 mg  650 mg Rectal Q6H PRN    polyethylene glycol (MIRALAX) packet 17 g  17 g Oral DAILY PRN    ondansetron (ZOFRAN ODT) tablet 4 mg  4 mg Oral Q8H PRN    Or    ondansetron (ZOFRAN) injection 4 mg  4 mg IntraVENous Q6H PRN    dexamethasone (DECADRON) 10 mg/mL injection 6 mg  6 mg IntraVENous Q24H       Signed:  Tedd Nyhan, NP    Part of this note may have been written by using a voice dictation software. The note has been proof read but may still contain some grammatical/other typographical errors.

## 2021-09-22 NOTE — H&P
Patient transferred for [facility] for covid protocol. See 9/21 note for assessment/plan. No change in history.

## 2021-09-22 NOTE — PROGRESS NOTES
MSN, CM:  Spoke with patient this AM about discharge planning. Patient lives with his mother Rosa Noe" in own 1st floor apartment. Patient is independent with all ADL's and requires no equipment for ambulation. Patient denies any home oxygen, HH or rehab in the past.  Patient works full time at [employer]. Patient confirms he has a new PCP but does not remember the name or group. Patient states he has an appointment Oct. 1st.  Patient denies any discharge needs at this time. Case Management will continue to follow for any discharge needs that may arise. Care Management Interventions  PCP Verified by CM: Yes (Has an appointment with new PCP Oct. 1st (patient does not remember name or group))  Mode of Transport at Discharge:  Other (see comment) (family to transport)  Health Maintenance Reviewed: Yes  Support Systems: Parent(s)  Confirm Follow Up Transport: Self  Freedom of Choice List was Provided with Basic Dialogue that Supports the Patient's Individualized Plan of Care/Goals, Treatment Preferences and Shares the Quality Data Associated with the Providers?: Yes  Discharge Location  Discharge Placement: Home

## 2021-09-22 NOTE — ACP (ADVANCE CARE PLANNING)
Advance Care Planning     General Advance Care Planning (ACP) Conversation      Date of Conversation: 9/21/2021  Conducted with: Patient with Decision Making Capacity    Healthcare Decision Maker:     Primary Decision Maker: enriqueta neely - Mother - [phone]    Secondary Decision Maker: Alvin Painter - Daughter - [phone]  Click here to complete [address] including selection of the Healthcare Decision Maker Relationship (ie \"Primary\")      Today we documented Decision Maker(s) consistent with Legal Next of Kin hierarchy.     Content/Action Overview:   Has NO ACP documents/care preferences - requested patient complete ACP documents  Reviewed DNR/DNI and patient elects Full Code (Attempt Resuscitation)  Topics discussed: ventilation preferences and resuscitation preferences       Length of Voluntary ACP Conversation in minutes:  <16 minutes (Non-Billable)    Jonathon Pimentel RN

## 2021-09-22 NOTE — ADT AUTH CERT NOTES
INPATIENT ADMISSION NOTIF     ADMISSION DATE 2021    UR CONTACT     BC HAYDEN   UR [phone]  UR Tonya 30 [phone]    Kostas Harris! [address] [facility]     FACILITY NPI :[id number]  FACILITY TAX ID : [id number]     [facility] 8 INTERMEDIATE CARE UNIT  [address] [place] [address]  [phone]            Patient Name :Deondre Partida.  : 1975 (46 yrs)  MRN : [id number]     Patient Mailing Address [facility] nu*                                          [address] [41] , [address]                                                        .         Insurance Plan Payor: Bony Torres / Plan: [address] / Product Type: PPO /      Primary Coverage Subscriber ID : [id number]         Current Patient Class : INPATIENT  Admit Date : 2021     REQUESTED LEVEL OF CARE: INPATIENT [101]                                                           Diagnosis : Pneumonia due to COVID-19 virus                          ICD10 Code : Pneumonia due to COVID-19 virus [U07.1, J12.82]          Admitting and Attending Info:  Admitting Provider : Alexys White MD   NPI: [id number]  Admitting Provider Phone. [phone] Provider Address: [place] [address]. Name: [address] [place] [address]                               Patient Demographics    Patient Name   Kaitlin Allen Sex   Male    1975 Address   [facility] number [address] [facility] [address] Phone   [phone] Health system   [phone] [facility]   Hospital Account    Name Acct ID Class Status Primary Coverage   [place] [id number] INPATIENT Open BLUE CROSS - [address]          Guarantor Account (for Hospital Account [de-identified])    Name Relation to Pt Service Area Active? Acct Type   [place].  Self BONSC Yes Personal/Family   Address Phone     [facility] number [address] [facility] 227 [phone])            Coverage Information (for Hospital Account [de-identified])    F/O Payor/Plan Subscriber  Subscriber Sex Precert #   BEVERLEY FOWLER/SC Tolu FOWLER [facility] [birth year] M    Subscriber Subscriber #   Nikhil Bowser [id number]   Grp # Group Name   [id number]    Address Phone   [address] [place], [address] [facility]    Policy Number Status Effective Date Benefits Phone   [id number] -  -   Auth/Cert             Admission Information    Arrival Date/Time:  Admit Date/Time: 2021 184 IP Adm. Date/Time: 2021   Admission Type: Elective Point of Origin: Other Type Of [address] [place] [address] Category:    Means of Arrival:  Primary Service: Medicine Secondary Service: N/A   Transfer Source:  Service Area: [facility] Unit: [facility] INTERMEDIATE CARE UNIT   Admit Provider: Jessica Cruz MD Attending Provider: Bella Cooks, MD Referring Provider:    Admission Information    Attending Provider Admission Dx Admitted on   Maciej Kohli MD Pneumonia due to COVID-19 virus 21   Service Isolation Code Status   MEDICINE Droplet Plus Full Code   Allergies Advance Care Planning    Pcn [Penicillins] Jump to the Activity     Admission Information    Unit/Bed: [address]/ Service: MEDICINE   Admitting provider: Jessica Cruz MD Phone: [phone]   Attending provider: Maciej Kohli MD Phone: [phone]   PCP: Gali Florez MD Phone:    Admission dx: respiratory failure due to covid  Patient class: I   Admission type: EL     Patient Demographics    Patient Name   Nikhil Rodriguez  [facility]   [id number] Legal Sex   Male    1975 Address   [facility] number 69 [facility] [address] Phone   [phone] [facility])   [phone] (Mobile)   H&P Notes     H&P by Arpan Wagner DO at 21 2228 documented on Admission (Current) from 2021 in [facility] 8 INTERMEDIATE CARE UNIT  Author: Arpan Wagner DO Author Type: Physician Filed: 21   Note Status: Signed Cosign: Cosign Not Required Date of Service: 21   : Lalitha Dhillon DO (Physician)         Patient transferred for [facility] for covid protocol. See  note for assessment/plan. No change in history.        H&P by Jordin Reeves DO at 21 documented on ED from 2021 in [facility] EMERGENCY DEPT  Author: Jordin Reeves DO Author Type: Physician Filed: 214   Note Status: Signed Cosign: Cosign Not Required Date of Service: 21   : Jordin Reeves DO (Physician)   Expand AllCollapse All               HOSPITALIST H&P/CONSULT  NAME:            Larisa Haile. Age:                40 y.o.  :               1975   MRN:               [id number]  PCP: Criss Mckeon MD  Consulting MD:  Treatment Team: Attending Provider: Jordin Reeves DO; Primary Nurse: Melvin Alexander RN  HPI:      41 yo CM with no past medical history presents with a 5 day history of worsening cough and shortness of breath. He went to [facility] earlier today and was swabbed for COVID but does not know the results. No known COVID contacts. He has had some mild nausea but denies any other GI issues including diarrhea. No joint or muscle aches. Denies fevers/chills. Denies loss of taste/smell. Has not been vaccinated. Denies chest pain. Has not noticed any swelling or redness in his legs.      PMHx: none  PSHx; reviewed and noncontributory  Social Hx:  reviewed and noncontributory  Family Hx:  reviewed and noncontributory     ED course: Rapid COVID is negative but RPP is positive. CXR is normal. CT chest negative for PE but does show bilateral infiltrates. Given fluid bolus, Decadron 10 mg IV once. Desatted to high 80s on RA, placed on NC to maintain O2 levels. Hospitalist consulted for admission.       Complete ROS done and is as stated in HPI or otherwise negative  History reviewed. No pertinent past medical history.          Past Surgical History:   Procedure Laterality Date    HX APPENDECTOMY          None           Allergies   Allergen Reactions    Pcn [Penicillins] Hives      Social History           Tobacco Use    Smoking status: Former Smoker    Smokeless tobacco: Current User    Tobacco comment: vapes   Substance Use Topics    Alcohol use: Not Currently      History reviewed. No pertinent family history. Objective:      Visit Vitals  /77   Pulse 98   Temp 98.8 °F (37.1 °C)   Resp 16   Ht 5' 9\" (1.753 m)   Wt 104.3 kg (230 lb)   SpO2 96%   BMI 33.97 kg/m²      Temp (24hrs), Av.8 °F (37.1 °C), Min:98.8 °F (37.1 °C), Max:98.8 °F (37.1 °C)     Oxygen Therapy  O2 Sat (%): 96 % (21)  Pulse via Oximetry: 97 beats per minute (21)  O2 Device: Nasal cannula (21)  O2 Flow Rate (L/min): 4 l/min (21)  Physical Exam:  General:          Alert, cooperative, no distress, appears stated age. Head:               Normocephalic, without obvious abnormality, atraumatic. Nose:               Nares normal. No drainage or sinus tenderness. Lungs:             Clear to auscultation bilaterally. No Wheezing or Rhonchi. No rales. Productive cough. On NC  Heart:              Regular rate and rhythm,  +S1, +S2. Abdomen:        Soft, non-tender. Not distended. Bowel sounds normal.   Extremities:     No cyanosis. No edema. No clubbing  Skin:                Texture, turgor normal. No rashes or lesions.   Not Jaundiced  Neurologic:      Alert and oriented x 3, no focal deficits   Data Review:   Recent Results         Recent Results (from the past 24 hour(s))   COVID-19 RAPID TEST     Collection Time: 21  6:10 PM   Result Value Ref Range     Specimen source Nasopharyngeal       COVID-19 rapid test Not detected NOTD     CBC WITH AUTOMATED DIFF     Collection Time: 21  6:23 PM   Result Value Ref Range     WBC 8.2 4.3 - 11.1 K/uL     RBC 5.80 (H) 4.23 - 5.6 M/uL     HGB 16.3 13.6 - 17.2 g/dL     HCT 49.0 41.1 - 50.3 %     MCV 84.5 79.6 - 97.8 FL     MCH 28.1 26.1 - 32.9 PG     MCHC 33.3 31.4 - 35.0 g/dL     RDW 13.2 11.9 - 14.6 %     PLATELET 247 378 - 949 K/uL     MPV 9.6 9.4 - 12.3 FL     ABSOLUTE NRBC 0.00 0.0 - 0.2 K/uL     DF AUTOMATED       NEUTROPHILS 80 (H) 43 - 78 %     LYMPHOCYTES 9 (L) 13 - 44 %     MONOCYTES 10 4.0 - 12.0 %     EOSINOPHILS 0 (L) 0.5 - 7.8 %     BASOPHILS 0 0.0 - 2.0 %     IMMATURE GRANULOCYTES 0 0.0 - 5.0 %     ABS. NEUTROPHILS 6.6 1.7 - 8.2 K/UL     ABS. LYMPHOCYTES 0.8 0.5 - 4.6 K/UL     ABS. MONOCYTES 0.8 0.1 - 1.3 K/UL     ABS. EOSINOPHILS 0.0 0.0 - 0.8 K/UL     ABS. BASOPHILS 0.0 0.0 - 0.2 K/UL     ABS. IMM. GRANS. 0.0 0.0 - 0.5 K/UL   METABOLIC PANEL, COMPREHENSIVE     Collection Time: 09/20/21  6:23 PM   Result Value Ref Range     Sodium 138 136 - 145 mmol/L     Potassium 3.8 3.5 - 5.1 mmol/L     Chloride 101 98 - 107 mmol/L     CO2 29 21 - 32 mmol/L     Anion gap 8 7 - 16 mmol/L     Glucose 119 (H) 65 - 100 mg/dL     BUN 13 6 - 23 MG/DL     Creatinine 0.70 (L) 0.8 - 1.5 MG/DL     GFR est AA >60 >60 ml/min/1.73m2     GFR est non-AA >60 >60 ml/min/1.73m2     Calcium 8.6 8.3 - 10.4 MG/DL     Bilirubin, total 0.5 0.2 - 1.1 MG/DL     ALT (SGPT) 29 12 - 65 U/L     AST (SGOT) 56 (H) 15 - 37 U/L     Alk.  phosphatase 94 50 - 136 U/L     Protein, total 7.6 6.3 - 8.2 g/dL     Albumin 3.2 (L) 3.5 - 5.0 g/dL     Globulin 4.4 (H) 2.3 - 3.5 g/dL     A-G Ratio 0.7 (L) 1.2 - 3.5     RESPIRATORY VIRUS PANEL W/COVID-19, PCR     Collection Time: 09/20/21  9:44 PM     Specimen: Nasopharyngeal   Result Value Ref Range     Adenovirus NOT DETECTED NOTDET       Coronavirus 229E NOT DETECTED NOTDET       Coronavirus HKU1 NOT DETECTED NOTDET       Coronavirus CVNL63 NOT DETECTED NOTDET       Coronavirus OC43 NOT DETECTED NOTDET       SARS-CoV-2, PCR Detected (A) NOTDET       Metapneumovirus NOT DETECTED NOTDET       Rhinovirus and Enterovirus NOT DETECTED NOTDET       Influenza A NOT DETECTED NOTDET   Influenza B NOT DETECTED NOTDET       Parainfluenza 1 NOT DETECTED NOTDET       Parainfluenza 2 NOT DETECTED NOTDET       Parainfluenza 3 NOT DETECTED NOTDET       Parainfluenza virus 4 NOT DETECTED NOTDET       RSV by PCR NOT DETECTED NOTDET       B. parapertussis, PCR NOT DETECTED NOTDET       Bordetella pertussis - PCR NOT DETECTED NOTDET       Chlamydophila pneumoniae DNA, QL, PCR NOT DETECTED NOTDET       Mycoplasma pneumoniae DNA, QL, PCR NOT DETECTED NOTDET           Imaging /Procedures /Studies      Assessment and Plan:           Active Hospital Problems     Diagnosis Date Noted    COVID-19 2021    Acute respiratory failure with hypoxia (HCC) 2021         PLAN     # Acute hypoxic respiratory failure due to COVID  - Decadron 6 mg qdaily  - ordered remdesivir  - monitor renal/hepatic function  - check CRP, procalcitonin  - CT chest negative for PE  - wean supplemental oxygen as tolerated     F/E/N: no fluids, replete electrolytes as needed, regular diet     Ppx: Lovenox BID for VTE     Code Status: FULL CODE     Disposition: Admit to Inpatient with plan as above, will need to go to COVID floor unit at [facility] when bed is available. Discussed with patient at bedside. All questions answered.      Signed By: Jasmin Will DO      2021           Patient Demographics    Patient Name   Leroy Cottrell.  [facility]   [id number] Legal Sex   Male    1975 Address   [facility] number [address] [place] [address] Phone   [phone] [facility])   [phone] (Mobile)   CSN:   [id number]   Admit Date: Admit Time Room Bed   Sep 21, 2021  6:44  [855] 01 [[id number]]   Attending Providers    Provider Pager From To   Gali Lebron MD  21   Renetta Queen MD  21   Myah Mancera MD  21    Emergency Contact(s)    Name Relation Home Work Mobile   enriqueta neely Mother   [phone]   Karen Qureshi Daughter   [phone]   Utilization Reviews       COVID 19 results by Steph Berkowitz RN       Review Entered Review Status   9/22/2021 15:11 In Primary      Criteria Review   Is the illness suspected to be related to the Coronavirus (COVID-19)? Yes  Yes, No or Other  Has the member been tested for the COVID-19? Yes  Yes or No  If Yes, what are the results of the COVID-19? Positive                    Positive, Negative or Pending  What is the severity of the members condition (i.e. Isolation, Ventilator use)?  Isolation     RESPIRATORY VIRUS PANEL W/COVID-19, PCR  Order: [id number]  Collected:  9/20/2021 21:44 Status:  Final result  Specimen Information: Nasopharyngeal          0 Result Notes    Ref Range & Units 9/20/21 2144   Adenovirus NOTDET   NOT DETECTED    Coronavirus 229E NOTDET   NOT DETECTED    Coronavirus HKU1 NOTDET   NOT DETECTED    Coronavirus CVNL63 NOTDET   NOT DETECTED    Coronavirus OC43 NOTDET   NOT DETECTED    SARS-CoV-2, PCR NOTDET   DetectedAbnormal                 Viral Illness, Acute - Care Day 2 (9/22/2021) by Steph Berkowitz RN       Review Entered Review Status   9/22/2021 15:08 Completed      Criteria Review      Care Day: 2 Care Date: 9/22/2021 Level of Care: Inpatient Floor    Guideline Day 2    Level Of Care    (X) Floor    9/22/2021 15:08:35 EDT by [facility]      floor    Clinical Status    ( ) * Hypotension absent    ( ) * No requirement for mechanical ventilation    ( ) * Oxygenation at baseline or improved    ( ) * Mental status at baseline    Activity    (X) Advance activity as tolerated    9/22/2021 15:08:35 EDT by [facility]      Activity as tolerated as assist    Routes    ( ) * Oral hydration    Interventions    (X) Possible isolation    9/22/2021 15:08:35 EDT by [facility]      Isolation    Medications    (X) Possible DVT prophylaxis    9/22/2021 15:08:35 EDT by [facility]      Lovenox SC    * Milestone   Additional Notes   Presenting Complaint: No chief complaint on file.     Reason(s) for Admission: Pneumonia due to COVID-19 virus [U07.1, J12.82]        Hospital Course & Interval History:   39 yo CM with no past medical history presented with a 5 day history of worsening cough and shortness of breath. He went to [facility] prior to coming to ER and was swabbed for COVID.  No known Huberney Jimenez contacts. Prisca Velez has had some mild nausea but denies any other GI issues including diarrhea. No joint or muscle aches. Denies fevers/chills. Denies loss of taste/smell. Has not been vaccinated. Denies chest pain. Has not noticed any swelling or redness in his legs.        Subjective (09/22/21):   No AEO.  Now on 10 Lpm.  Even and unlabored during encounter.  No new complaints.       Assessment & Plan:       # Acute hypoxic respiratory failure due to COVID   - Decadron 6 mg qd Day 3   - Remdesivir Day 3   - monitor renal/hepatic function   - CT chest negative for PE   - wean supplemental oxygen as tolerated   9/21 - remains on 4L NC. CRP is 19. Will repeat IN AM. May qualify for baricinitib vs actemra.    Sep/22: CRP now 8.0 from 19.9       # Obesity Class 1   - BMI 33.97           Dispo/Discharge Planning: > 2 midnights       Diet:  ADULT DIET Regular; 3 carb choices (45 gm/meal)   DVT PPx: enoxaparin   Code status: Full Code       Hospital Problems as of 9/22/2021 Never Reviewed     Codes Class Noted - Resolved POA     * (Principal) Pneumonia due to COVID-19 virus ICD-10-CM: U07.1, J12.82   ICD-9-CM: 480.8, 079.89   9/21/2021 - Present Unknown                     Objective:       Patient Vitals for the past 24 hrs:     Temp Pulse Resp BP SpO2   09/22/21 1118 98.1 °F (36.7 °C) 95 20 122/75 94 %   09/22/21 0706 97.5 °F (36.4 °C) 96 21 101/69 95 %   09/22/21 0359 97.9 °F (36.6 °C) 88 20 111/70 93 %   09/21/21 2246 98 °F (36.7 °C) 80 20 99/65 94 %   09/21/21 1939 97.7 °F (36.5 °C) (!) 102 20 119/74 92 %       Oxygen Therapy   O2 Sat (%): 94 % (09/22/21 1118)   O2 Device: Nasal cannula (09/22/21 0706)   O2 Flow Rate (L/min): 10 l/min (09/22/21 0706)       Estimated body mass index is 33.97 kg/m² as calculated from the following:     Height as of 9/20/21: 5' 9\" (1.753 m).     Weight as of 9/20/21: 104.3 kg (230 lb).     Intake/Output Summary (Last 24 hours) at 9/22/2021 1129   Last data filed at 9/22/2021 1009   Gross per 24 hour   Intake 360 ml   Output -   Net 360 ml            Physical Exam:    General:          No overt distress   Head:               Normocephalic, atraumatic   Eyes:               Sclerae appear normal.  Pupils equally round. ENT:                Nares appear normal, no drainage.  Moist oral mucosa   Neck:               No restricted ROM.  Trachea midline    CV:                  RRR.  No m/r/g. Lungs:             Respirations even, unlabored during encounter.  On 10 lpm.   Abdomen:        Soft, nontender, nondistended.    Extremities:     No cyanosis or clubbing.     Skin:                No rashes and normal coloration.   Warm and dry.     Neuro:             Cranial nerves II-XII grossly intact.      Psych:             Normal mood and affect.  Alert and oriented x3       I have reviewed ordered lab tests and independently visualized imaging below:       Last 24hr Labs:   Recent Results   Recent Results (from the past 24 hour(s))   PROCALCITONIN     Collection Time: 09/21/21  8:01 PM   Result Value Ref Range     Procalcitonin <5.42 ng/mL   METABOLIC PANEL, COMPREHENSIVE     Collection Time: 09/22/21  7:22 AM   Result Value Ref Range     Sodium 143 138 - 145 mmol/L     Potassium 3.9 3.5 - 5.1 mmol/L     Chloride 108 (H) 98 - 107 mmol/L     CO2 26 21 - 32 mmol/L     Anion gap 9 7 - 16 mmol/L     Glucose 89 65 - 100 mg/dL     BUN 23 6 - 23 MG/DL     Creatinine 0.62 (L) 0.8 - 1.5 MG/DL     GFR est AA >60 >60 ml/min/1.73m2     GFR est non-AA >60 >60 ml/min/1.73m2     Calcium 8.4 8.3 - 10.4 MG/DL     Bilirubin, total 0.5 0.2 - 1.1 MG/DL     ALT (SGPT) 25 12 - 65 U/L     AST (SGOT) 46 (H) 15 - 37 U/L   Alk. phosphatase 91 50 - 136 U/L     Protein, total 6.6 6.3 - 8.2 g/dL     Albumin 2.6 (L) 3.5 - 5.0 g/dL     Globulin 4.0 (H) 2.3 - 3.5 g/dL     A-G Ratio 0.7 (L) 1.2 - 3.5     CBC WITH AUTOMATED DIFF     Collection Time: 09/22/21  7:22 AM   Result Value Ref Range     WBC 8.3 4.3 - 11.1 K/uL     RBC 5.36 4.23 - 5.6 M/uL     HGB 15.2 13.6 - 17.2 g/dL     HCT 46.2 41.1 - 50.3 %     MCV 86.2 79.6 - 97.8 FL     MCH 28.4 26.1 - 32.9 PG     MCHC 32.9 31.4 - 35.0 g/dL     RDW 13.6 11.9 - 14.6 %     PLATELET 867 174 - 146 K/uL     MPV 9.5 9.4 - 12.3 FL     ABSOLUTE NRBC 0.00 0.0 - 0.2 K/uL     DF AUTOMATED      NEUTROPHILS 78 43 - 78 %     LYMPHOCYTES 9 (L) 13 - 44 %     MONOCYTES 12 4.0 - 12.0 %     EOSINOPHILS 0 (L) 0.5 - 7.8 %     BASOPHILS 0 0.0 - 2.0 %     IMMATURE GRANULOCYTES 1 0.0 - 5.0 %     ABS. NEUTROPHILS 6.5 1.7 - 8.2 K/UL     ABS. LYMPHOCYTES 0.8 0.5 - 4.6 K/UL     ABS. MONOCYTES 1.0 0.1 - 1.3 K/UL     ABS. EOSINOPHILS 0.0 0.0 - 0.8 K/UL     ABS. BASOPHILS 0.0 0.0 - 0.2 K/UL     ABS. IMM.  GRANS. 0.0 0.0 - 0.5 K/UL   MAGNESIUM     Collection Time: 09/22/21  7:22 AM   Result Value Ref Range     Magnesium 2.5 (H) 1.8 - 2.4 mg/dL   C REACTIVE PROTEIN, QT     Collection Time: 09/22/21  7:22 AM   Result Value Ref Range     C-Reactive protein 8.0 (H) 0.0 - 0.9 mg/dL   D DIMER     Collection Time: 09/22/21  7:22 AM   Result Value Ref Range     D DIMER 0.63 (H) <0.56 ug/ml(FEU)              All Micro Results      None               Other Studies:   No results found.       Current Meds:   Current Facility-Administered Medications   Medication Dose Route Frequency   · [START ON 9/23/2021] enoxaparin (LOVENOX) injection 40 mg 40 mg SubCUTAneous DAILY   · remdesivir 100 mg in 0.9% sodium chloride 250 mL IVPB 100 mg IntraVENous Q24H   · sodium chloride (NS) flush 5-40 mL 5-40 mL IntraVENous Q8H   · sodium chloride (NS) flush 5-40 mL 5-40 mL IntraVENous PRN   · acetaminophen (TYLENOL) tablet 650 mg 650 mg Oral Q6H PRN   Or   · acetaminophen (TYLENOL) suppository 650 mg 650 mg Rectal Q6H PRN   · polyethylene glycol (MIRALAX) packet 17 g 17 g Oral DAILY PRN   · ondansetron (ZOFRAN ODT) tablet 4 mg 4 mg Oral Q8H PRN     Or   · ondansetron (ZOFRAN) injection 4 mg 4 mg IntraVENous Q6H PRN   · dexamethasone (DECADRON) 10 mg/mL injection 6 mg 6 mg IntraVENous Q24H                 Viral Illness, Acute - Care Day 1 (9/21/2021) by Addis Meehan RN       Review Entered Review Status   9/22/2021 11:02 Completed      Criteria Review      Care Day: 1 Care Date: 9/21/2021 Level of Care: Inpatient Floor    Guideline Day 1    Level Of Care    (X) ICU or floor    9/22/2021 10:49:07 EDT by Kelby Whitmore      floor    Clinical Status    (X) * Clinical Indications met    9/22/2021 10:49:07 EDT by Kelby Whitmore      39 y/o admitted with pneumonia due to COVID 19    Interventions    (X) Possible isolation    9/22/2021 10:49:07 EDT by Kelby Whitmore      Isolation    (X) CBC with differential, chemistries, renal and hepatic function testing, C-reactive protein, coagulation panel    Medications    (X) Possible antiviral medication    9/22/2021 11:02:05 EDT by Kelby Whitmore      Remdesivir    * Milestone   Additional Notes   Patient transferred to [facility]      39 yo CM with no past medical history presents with a 5 day history of worsening cough and shortness of breath. He went to [facility] earlier today and was swabbed for COVID but does not know the results. No known COVID contacts. He has had some mild nausea but denies any other GI issues including diarrhea. No joint or muscle aches. Denies fevers/chills. Denies loss of taste/smell. Has not been vaccinated. Denies chest pain. Has not noticed any swelling or redness in his legs.        PMHx: none   PSHx; reviewed and noncontributory   Social Hx:  reviewed and noncontributory   Family Hx:  reviewed and noncontributory       ED course: Rapid COVID is negative but RPP is positive.  CXR is normal. CT chest negative for PE but does show bilateral infiltrates. Given fluid bolus, Decadron 10 mg IV once. Desatted to high 80s on RA, placed on NC to maintain O2 levels. Hospitalist consulted for admission. Subjective (09/21/21): Patient sleeping on my arrival, easily awakens. Says he still is having dyspnea and cough. Remains on 4L nc.        Assessment & Plan:       # Acute hypoxic respiratory failure due to COVID   - Decadron 6 mg qdaily D2   - Remdesivir D2   - monitor renal/hepatic function   - CT chest negative for PE   - wean supplemental oxygen as tolerated   9/21 - remains on 4L NC. CRP is 19.  Will repeat IN AM. May qualify for baricinitib vs actemra.        # BMI 33       F/E/N: no fluids, replete electrolytes as needed, regular diet         Dispo/Discharge Planning:       Pending clinical improvement       Diet:  No diet orders on file   DVT PPx: lovenox bid   Code status: Full Code      Objective:       Patient Vitals for the past 24 hrs:     Pulse Resp BP SpO2   09/21/21 1802 99 20 127/72 92 %   09/21/21 1600 - - - 95 %   09/21/21 0803 89 - 135/71 93 %   09/21/21 0733 92 - 119/76 95 %   09/21/21 0703 87 - 119/69 91 %   09/21/21 0702 88 - - 92 %   09/21/21 0650 87 - - 91 %   09/21/21 0640 95 - - 91 %   09/21/21 0633 94 - 120/77 91 %   09/21/21 0603 92 - 118/75 93 %   09/21/21 0602 89 - - 95 %   09/21/21 0533 95 - 128/73 93 %   09/21/21 0503 95 18 127/77 92 %   09/21/21 0436 90 - - 90 %   09/21/21 0433 94 30 117/75 96 %   09/21/21 0355 90 - - 92 %   09/21/21 0333 93 - 119/67 93 %   09/21/21 0303 97 22 120/75 90 %   09/21/21 0233 90 - 123/67 90 %   09/21/21 0203 100 28 119/74 94 %   09/21/21 0133 97 - 120/73 94 %   09/21/21 0109 98 - - 96 %   09/21/21 0103 (!) 101 - 108/71 90 %   09/21/21 0033 (!) 102 - 124/77 (!) 84 %   09/21/21 0014 97 - - 100 %   09/21/21 0003 (!) 102 - 126/69 (!) 89 %   09/20/21 2333 98 - 120/77 93 %   09/20/21 2303 (!) 101 - 128/71 93 %   09/20/21 2233 - - 124/73 93 %     Oxygen Therapy   O2 Sat (%): 92 % (09/21/21 1802)   Pulse via Oximetry: 99 beats per minute (09/21/21 1802)   O2 Device: Nasal cannula (09/20/21 2151)   O2 Flow Rate (L/min): 4 l/min (09/21/21 0109)       Estimated body mass index is 33.97 kg/m² as calculated from the following:     Height as of this encounter: 5' 9\" (1.753 m).     Weight as of this encounter: 104.3 kg (230 lb).     Intake/Output Summary (Last 24 hours) at 9/21/2021 2224   Last data filed at 9/20/2021 2248   Gross per 24 hour   Intake 1000 ml   Output -   Net 1000 ml            Physical Exam:        General:          Well nourished.  No overt distress   Head:               Normocephalic, atraumatic   Eyes:               Sclerae appear normal.  Pupils equally round. ENT:                Nares appear normal, no drainage.  Moist oral mucosa   Neck:               No restricted ROM.  Trachea midline    CV:                  RRR.  No m/r/g.  No jugular venous distension. Lungs:             diminished  No wheezing, rhonchi, or rales.  Respirations even, unlabored   Abdomen:        Bowel sounds present.  Soft, nontender, nondistended.    Extremities:     No cyanosis or clubbing.  No edema   Skin:                No rashes and normal coloration.   Warm and dry.     Neuro:             Cranial nerves II-XII grossly intact.   Sensation intact   Psych:             Normal mood and affect.  Alert and oriented x3              Medications,   acetaminophen (TYLENOL) tablet 650 mg    Dose: 650 mg   Freq: EVERY 6 HOURS AS NEEDED Route: PO   PRN Reasons: Mild Pain,Fever      remdesivir 200 mg in 0.9% sodium chloride 250 mL IVPB    Dose: 200 mg   Freq: ONCE Route: IV   Start: 09/21/21 2000 End: 09/21/21 2147      Viral Illness, Acute - Clinical Indications for Admission to Inpatient Care by Xavier Sullivan RN       Review Entered Review Status   9/22/2021 10:45 Completed      Criteria Review      Clinical Indications for Admission to Inpatient Care    Most Recent : Verner England Most Recent Date: 9/22/2021 10:45:30 EDT    (X) Admission is indicated for  1 or more  of the following  [A] [B] (1) (2) (3) (4) (5) (6) (7)    (8) (9):       (X) Pulmonary manifestation, as indicated by  1 or more  of the following :          (X) Hypoxemia          9/22/2021 10:45:30 EDT by Verner England            shortness of breath

## 2021-09-22 NOTE — PROGRESS NOTES
Hospitalist Progress Note   Admit Date:  2021  8:01 PM   Name:  Colt Carpenter. Age:  40 y.o. Sex:  male  :  1975   MRN:  [id number]   Room:      Presenting Complaint: Concern For COVID-19 (Coronavirus) and Shortness of Breath    Reason(s) for Admission: Acute respiratory failure with hypoxia [facility]) [J96.01]     Hospital Course & Interval History:     39 yo CM with no past medical history presents with a 5 day history of worsening cough and shortness of breath. He went to [facility] earlier today and was swabbed for COVID but does not know the results. No known COVID contacts. He has had some mild nausea but denies any other GI issues including diarrhea. No joint or muscle aches. Denies fevers/chills. Denies loss of taste/smell. Has not been vaccinated. Denies chest pain. Has not noticed any swelling or redness in his legs.      PMHx: none  PSHx; reviewed and noncontributory  Social Hx:  reviewed and noncontributory  Family Hx:  reviewed and noncontributory     ED course: Rapid COVID is negative but RPP is positive. CXR is normal. CT chest negative for PE but does show bilateral infiltrates. Given fluid bolus, Decadron 10 mg IV once. Desatted to high 80s on RA, placed on NC to maintain O2 levels. Hospitalist consulted for admission.         Subjective (21): Patient sleeping on my arrival, easily awakens. Says he still is having dyspnea and cough. Remains on 4L nc. Assessment & Plan:     # Acute hypoxic respiratory failure due to COVID  - Decadron 6 mg qdaily D2  - Remdesivir D2  - monitor renal/hepatic function  - CT chest negative for PE  - wean supplemental oxygen as tolerated   - remains on 4L NC. CRP is 19. Will repeat IN AM. May qualify for baricinitib vs actemra.      # BMI 33     F/E/N: no fluids, replete electrolytes as needed, regular diet       Dispo/Discharge Planning:      Pending clinical improvement    Diet:  No diet orders on file  DVT PPx: lovenox bid  Code status: Full Code    Hospital Problems as of 9/21/2021 Never Reviewed        Codes Class Noted - Resolved POA    COVID-19 ICD-10-CM: U07.1  ICD-9-CM: 079.89  9/21/2021 - Present Unknown        * (Principal) Acute respiratory failure with hypoxia (HCC) ICD-10-CM: J96.01  ICD-9-CM: 518.81  9/20/2021 - Present Unknown              Objective:     Patient Vitals for the past 24 hrs:   Pulse Resp BP SpO2   09/21/21 1802 99 20 127/72 92 %   09/21/21 1600 -- -- -- 95 %   09/21/21 0803 89 -- 135/71 93 %   09/21/21 0733 92 -- 119/76 95 %   09/21/21 0703 87 -- 119/69 91 %   09/21/21 0702 88 -- -- 92 %   09/21/21 0650 87 -- -- 91 %   09/21/21 0640 95 -- -- 91 %   09/21/21 0633 94 -- 120/77 91 %   09/21/21 0603 92 -- 118/75 93 %   09/21/21 0602 89 -- -- 95 %   09/21/21 0533 95 -- 128/73 93 %   09/21/21 0503 95 18 127/77 92 %   09/21/21 0436 90 -- -- 90 %   09/21/21 0433 94 30 117/75 96 %   09/21/21 0355 90 -- -- 92 %   09/21/21 0333 93 -- 119/67 93 %   09/21/21 0303 97 22 120/75 90 %   09/21/21 0233 90 -- 123/67 90 %   09/21/21 0203 100 28 119/74 94 %   09/21/21 0133 97 -- 120/73 94 %   09/21/21 0109 98 -- -- 96 %   09/21/21 0103 (!) 101 -- 108/71 90 %   09/21/21 0033 (!) 102 -- 124/77 (!) 84 %   09/21/21 0014 97 -- -- 100 %   09/21/21 0003 (!) 102 -- 126/69 (!) 89 %   09/20/21 2333 98 -- 120/77 93 %   09/20/21 2303 (!) 101 -- 128/71 93 %   09/20/21 2233 -- -- 124/73 93 %     Oxygen Therapy  O2 Sat (%): 92 % (09/21/21 1802)  Pulse via Oximetry: 99 beats per minute (09/21/21 1802)  O2 Device: Nasal cannula (09/20/21 2151)  O2 Flow Rate (L/min): 4 l/min (09/21/21 0109)    Estimated body mass index is 33.97 kg/m² as calculated from the following:    Height as of this encounter: 5' 9\" (1.753 m). Weight as of this encounter: 104.3 kg (230 lb).     Intake/Output Summary (Last 24 hours) at 9/21/2021 2224  Last data filed at 9/20/2021 2248  Gross per 24 hour   Intake 1000 ml   Output --   Net 1000 ml         Physical Exam: General:    Well nourished. No overt distress  Head:  Normocephalic, atraumatic  Eyes:  Sclerae appear normal.  Pupils equally round. ENT:  Nares appear normal, no drainage. Moist oral mucosa  Neck:  No restricted ROM. Trachea midline   CV:   RRR. No m/r/g. No jugular venous distension. Lungs:   diminished  No wheezing, rhonchi, or rales. Respirations even, unlabored  Abdomen: Bowel sounds present. Soft, nontender, nondistended. Extremities: No cyanosis or clubbing. No edema  Skin:     No rashes and normal coloration. Warm and dry. Neuro:  Cranial nerves II-XII grossly intact. Sensation intact  Psych:  Normal mood and affect.   Alert and oriented x3    I have reviewed ordered lab tests and independently visualized imaging below:    Last 24hr Labs:  Recent Results (from the past 24 hour(s))   PROCALCITONIN    Collection Time: 09/21/21  8:01 PM   Result Value Ref Range    Procalcitonin <0.05 ng/mL       All Micro Results     Procedure Component Value Units Date/Time    RESPIRATORY VIRUS PANEL W/COVID-19, PCR [[id number]]  (Abnormal) Collected: 09/20/21 2144    Order Status: Completed Specimen: Nasopharyngeal Updated: 09/21/21 0129     Adenovirus NOT DETECTED        Coronavirus 229E NOT DETECTED        Coronavirus HKU1 NOT DETECTED        Coronavirus CVNL63 NOT DETECTED        Coronavirus OC43 NOT DETECTED        SARS-CoV-2, PCR Detected        Comment: RESULTS VERIFIED, PHONED TO AND READ BACK BY  AZRA JONES @0122 9.21.2021 BY STEPHEN          Metapneumovirus NOT DETECTED        Rhinovirus and Enterovirus NOT DETECTED        Influenza A NOT DETECTED        Influenza B NOT DETECTED        Parainfluenza 1 NOT DETECTED        Parainfluenza 2 NOT DETECTED        Parainfluenza 3 NOT DETECTED        Parainfluenza virus 4 NOT DETECTED        RSV by PCR NOT DETECTED        B. parapertussis, PCR NOT DETECTED        Bordetella pertussis - PCR NOT DETECTED        Chlamydophila pneumoniae DNA, QL, PCR NOT DETECTED Mycoplasma pneumoniae DNA, QL, PCR NOT DETECTED       COVID-19 RAPID TEST [[id number]] Collected: 09/20/21 1810    Order Status: Completed Specimen: Nasopharyngeal Updated: 09/20/21 1834     Specimen source Nasopharyngeal        COVID-19 rapid test Not detected        Comment:      The specimen is NEGATIVE for SARS-CoV-2, the novel coronavirus associated with COVID-19. A negative result does not rule out COVID-19. This test has been authorized by the FDA under an Emergency Use Authorization (EUA) for use by authorized laboratories. Fact sheet for Healthcare Providers: [employer]  Fact sheet for Patients: [employer]stat[employer]com       Methodology: Isothermal Nucleic Acid Amplification               Other Studies:  No results found. Current Meds:  No current facility-administered medications for this encounter. No current outpatient medications on file. Facility-Administered Medications Ordered in Other Encounters   Medication Dose Route Frequency    [START ON 9/22/2021] remdesivir 100 mg in 0.9% sodium chloride 250 mL IVPB  100 mg IntraVENous Q24H    sodium chloride (NS) flush 5-40 mL  5-40 mL IntraVENous Q8H    sodium chloride (NS) flush 5-40 mL  5-40 mL IntraVENous PRN    acetaminophen (TYLENOL) tablet 650 mg  650 mg Oral Q6H PRN    Or    acetaminophen (TYLENOL) suppository 650 mg  650 mg Rectal Q6H PRN    polyethylene glycol (MIRALAX) packet 17 g  17 g Oral DAILY PRN    ondansetron (ZOFRAN ODT) tablet 4 mg  4 mg Oral Q8H PRN    Or    ondansetron (ZOFRAN) injection 4 mg  4 mg IntraVENous Q6H PRN    [START ON 9/22/2021] enoxaparin (LOVENOX) injection 30 mg  30 mg SubCUTAneous DAILY    [START ON 9/22/2021] dexamethasone (DECADRON) 10 mg/mL injection 6 mg  6 mg IntraVENous Q24H       Signed:  Jaden Tee, DO    Part of this note may have been written by using a voice dictation software.   The note has been proof read but may still contain some grammatical/other typographical errors.

## 2021-09-22 NOTE — DISCHARGE SUMMARY
Patient discharged to [facility] for covid protocol.  See progress note for details

## 2021-09-23 LAB — PROCALCITONIN SERPL-MCNC: 0.05 NG/ML

## 2021-09-23 PROCEDURE — 36415 COLL VENOUS BLD VENIPUNCTURE: CPT

## 2021-09-23 PROCEDURE — 74011250636 HC RX REV CODE- 250/636: Performed by: HOSPITALIST

## 2021-09-23 PROCEDURE — 65270000029 HC RM PRIVATE

## 2021-09-23 PROCEDURE — 74011250637 HC RX REV CODE- 250/637: Performed by: NURSE PRACTITIONER

## 2021-09-23 PROCEDURE — 84145 PROCALCITONIN (PCT): CPT

## 2021-09-23 PROCEDURE — XW0DXM6 INTRODUCTION OF BARICITINIB INTO MOUTH AND PHARYNX, EXTERNAL APPROACH, NEW TECHNOLOGY GROUP 6: ICD-10-PCS | Performed by: NURSE PRACTITIONER

## 2021-09-23 PROCEDURE — 74011250636 HC RX REV CODE- 250/636: Performed by: STUDENT IN AN ORGANIZED HEALTH CARE EDUCATION/TRAINING PROGRAM

## 2021-09-23 RX ADMIN — Medication 5 ML: at 05:54

## 2021-09-23 RX ADMIN — Medication 10 ML: at 13:07

## 2021-09-23 RX ADMIN — DEXAMETHASONE SODIUM PHOSPHATE 6 MG: 10 INJECTION INTRAMUSCULAR; INTRAVENOUS at 08:04

## 2021-09-23 RX ADMIN — Medication 10 ML: at 22:00

## 2021-09-23 RX ADMIN — ENOXAPARIN SODIUM 40 MG: 40 INJECTION SUBCUTANEOUS at 08:04

## 2021-09-23 RX ADMIN — BARICITINIB 4 MG: 2 TABLET, FILM COATED ORAL at 13:07

## 2021-09-23 NOTE — ADT AUTH CERT NOTES
INPATIENT ADMISSION NOTIFICATION     ADMISSION DATE 2021    UR CONTACT - Gareth Clark     UR RETURN -[phone]  UR PHONE [phone] [facility] VOICEMAIL)     Rock Garciau YOU !!     [facility]     FACILITY NPI :[id number]  FACILITY TAX ID : [id number]     [facility] EMERGENCY DEPT  [address] [facility]  [phone]            Patient Name :Yoan Riddle.  : 1975 (46 yrs)  MRN : [id number]     Patient Mailing Address [place] [address]                                          [address]41] , [address]                                                        .         Insurance Plan Payor: Chilo Found / Plan[place] BLUE [place] OF      Primary Coverage Subscriber ID : [id number]     Current Patient Class : INPATIENT  Admit Date : 2021     REQUESTED LEVEL OF CARE: INPATIENT [101]                                                           Diagnosis : [facility] ([facility]                          ICD10 Code : Acute respiratory failure with hypoxia [facility]) [J96.01]          Admitting and Attending Info:  Admitting Provider : Arthur Fernandez DO   NPI: [id number]  Admitting Provider Phone. [phone]  Admitting Provider Address: SAME AS FACILITY            Patient Demographics    Patient Name   Sil Casey Sex   Male    1975 Address   [facility] [address] [facility] [address] Phone   [phone] [facility]   [phone] [facility]   Hospital Account    Name Acct ID Class Status Primary Coverage   Mayuri [place] [id number] INPATIENT Discharged/Not Billed BLUE CROSS - [address]          Guarantor Account (for Hospital Account [de-identified])    Name Relation to Pt Service Area Active? Acct Type   Mayurirell [place].  Self BONSC Yes Personal/Family   Address Phone     [facility] [address] [address] [phone](U)            Coverage Information (for Hospital Account [de-identified])    F/O Payor/Plan Subscriber  Subscriber Sex Precert #   BEVERLEY FOWLER/[place] Tolu FOWLER [facility] [birth year] M    Subscriber Subscriber #   Tha Vera [id number]   Grp # Group Name   [id number]    Address Phone   [address] [facility], [address] [place] [address]    Policy Number Status Effective Date Benefits Phone   [id number] -  -   Auth/Cert             Diagnosis     Codes Comments   Hypoxia  ICD-10-CM: R09.02   ICD-9-CM: 799.02           Admission Information    Arrival Date/Time: 2021 Admit Date/Time: 2021 IP Adm. Date/Time: 2021   Admission Type: Emergency Point of Origin: Non-health Care Facility/self Admit Category:    Means of Arrival: Car Primary Service: Medicine Secondary Service: N/A   Transfer Source:  Service Area: [facility] Unit: [facility] EMERGENCY DEPT   Admit Provider: Silva Flores DO Attending Provider: Micah Lennox, DO Referring Provider:    Admission Information    Attending Provider Admission Dx Admitted on    Acute respiratory failure with hypoxia ([facility] 21   Service Isolation Code Status   MEDICINE  Full Code   Allergies Advance Care Planning    Pcn [Penicillins] Jump to the Activity     Admission Information    Unit/Bed: [facility] EMERGENCY DEPT/09 Service: MEDICINE   Admitting provider: Silva Flores DO Phone: [phone]   Attending provider:  Phone:    PCP: Candis Hare MD Phone:    Admission dx:  Patient class: I   Admission type: ER     Patient Demographics    Patient Name   Tha Vera.  [facility]   [id number] Legal Sex   Male    1975 Address   [facility] [address] [place] [id number] Phone   [phone] [facility])   [phone] (Mobile)   H&P Notes     H&P by Nayla Wakefield DO at 21 2228 documented on Admission (Current) from 2021 in [facility] 8 INTERMEDIATE CARE UNIT  Author: Nayla Wakefield DO Author Type: Physician Filed: 21   Note Status: Signed Cosign: Cosign Not Required Date of Service: 21   : Toribio Xavier DO (Physician)         Patient transferred for [facility] for covid protocol. See  note for assessment/plan. No change in history.        H&P by Aditi Horton DO at 21 2897 documented on ED from 2021 in [facility] EMERGENCY DEPT  Author: Aditi Horton DO Author Type: Physician Filed: 21 0224   Note Status: Signed Cosign: Cosign Not Required Date of Service: 215   : Aditi Horton DO (Physician)   Expand AllCollapse All               HOSPITALIST H&P/CONSULT  NAME:            Vaishnavi Rodriguez. Age:                40 y.o.  :               1975   MRN:               [id number]  PCP: Rafia Pedersen MD  Consulting MD:  Treatment Team: Attending Provider: Aditi Horotn DO; Primary Nurse: Jennifer Rg RN  HPI:      41 yo CM with no past medical history presents with a 5 day history of worsening cough and shortness of breath. He went to [facility] earlier today and was swabbed for COVID but does not know the results. No known COVID contacts. He has had some mild nausea but denies any other GI issues including diarrhea. No joint or muscle aches. Denies fevers/chills. Denies loss of taste/smell. Has not been vaccinated. Denies chest pain. Has not noticed any swelling or redness in his legs.      PMHx: none  PSHx; reviewed and noncontributory  Social Hx:  reviewed and noncontributory  Family Hx:  reviewed and noncontributory     ED course: Rapid COVID is negative but RPP is positive. CXR is normal. CT chest negative for PE but does show bilateral infiltrates. Given fluid bolus, Decadron 10 mg IV once. Desatted to high 80s on RA, placed on NC to maintain O2 levels. Hospitalist consulted for admission.       Complete ROS done and is as stated in HPI or otherwise negative  History reviewed. No pertinent past medical history.          Past Surgical History:   Procedure Laterality Date    HX APPENDECTOMY          None           Allergies   Allergen Reactions    Pcn [Penicillins] Hives      Social History           Tobacco Use    Smoking status: Former Smoker    Smokeless tobacco: Current User    Tobacco comment: vapes   Substance Use Topics    Alcohol use: Not Currently      History reviewed. No pertinent family history. Objective:      Visit Vitals  /77   Pulse 98   Temp 98.8 °F (37.1 °C)   Resp 16   Ht 5' 9\" (1.753 m)   Wt 104.3 kg (230 lb)   SpO2 96%   BMI 33.97 kg/m²      Temp (24hrs), Av.8 °F (37.1 °C), Min:98.8 °F (37.1 °C), Max:98.8 °F (37.1 °C)     Oxygen Therapy  O2 Sat (%): 96 % (21)  Pulse via Oximetry: 97 beats per minute (21)  O2 Device: Nasal cannula (21)  O2 Flow Rate (L/min): 4 l/min (21)  Physical Exam:  General:          Alert, cooperative, no distress, appears stated age. Head:               Normocephalic, without obvious abnormality, atraumatic. Nose:               Nares normal. No drainage or sinus tenderness. Lungs:             Clear to auscultation bilaterally. No Wheezing or Rhonchi. No rales. Productive cough. On NC  Heart:              Regular rate and rhythm,  +S1, +S2. Abdomen:        Soft, non-tender. Not distended. Bowel sounds normal.   Extremities:     No cyanosis. No edema. No clubbing  Skin:                Texture, turgor normal. No rashes or lesions.   Not Jaundiced  Neurologic:      Alert and oriented x 3, no focal deficits   Data Review:   Recent Results         Recent Results (from the past 24 hour(s))   COVID-19 RAPID TEST     Collection Time: 21  6:10 PM   Result Value Ref Range     Specimen source Nasopharyngeal       COVID-19 rapid test Not detected NOTD     CBC WITH AUTOMATED DIFF     Collection Time: 21  6:23 PM   Result Value Ref Range     WBC 8.2 4.3 - 11.1 K/uL     RBC 5.80 (H) 4.23 - 5.6 M/uL     HGB 16.3 13.6 - 17.2 g/dL     HCT 49.0 41.1 - 50.3 %     MCV 84.5 79.6 - 97.8 FL     MCH 28.1 26.1 - 32.9 PG     MCHC 33.3 31.4 - 35.0 g/dL     RDW 13.2 11.9 - 14.6 %     PLATELET 202 527 - 769 K/uL     MPV 9.6 9.4 - 12.3 FL     ABSOLUTE NRBC 0.00 0.0 - 0.2 K/uL     DF AUTOMATED       NEUTROPHILS 80 (H) 43 - 78 %     LYMPHOCYTES 9 (L) 13 - 44 %     MONOCYTES 10 4.0 - 12.0 %     EOSINOPHILS 0 (L) 0.5 - 7.8 %     BASOPHILS 0 0.0 - 2.0 %     IMMATURE GRANULOCYTES 0 0.0 - 5.0 %     ABS. NEUTROPHILS 6.6 1.7 - 8.2 K/UL     ABS. LYMPHOCYTES 0.8 0.5 - 4.6 K/UL     ABS. MONOCYTES 0.8 0.1 - 1.3 K/UL     ABS. EOSINOPHILS 0.0 0.0 - 0.8 K/UL     ABS. BASOPHILS 0.0 0.0 - 0.2 K/UL     ABS. IMM. GRANS. 0.0 0.0 - 0.5 K/UL   METABOLIC PANEL, COMPREHENSIVE     Collection Time: 09/20/21  6:23 PM   Result Value Ref Range     Sodium 138 136 - 145 mmol/L     Potassium 3.8 3.5 - 5.1 mmol/L     Chloride 101 98 - 107 mmol/L     CO2 29 21 - 32 mmol/L     Anion gap 8 7 - 16 mmol/L     Glucose 119 (H) 65 - 100 mg/dL     BUN 13 6 - 23 MG/DL     Creatinine 0.70 (L) 0.8 - 1.5 MG/DL     GFR est AA >60 >60 ml/min/1.73m2     GFR est non-AA >60 >60 ml/min/1.73m2     Calcium 8.6 8.3 - 10.4 MG/DL     Bilirubin, total 0.5 0.2 - 1.1 MG/DL     ALT (SGPT) 29 12 - 65 U/L     AST (SGOT) 56 (H) 15 - 37 U/L     Alk.  phosphatase 94 50 - 136 U/L     Protein, total 7.6 6.3 - 8.2 g/dL     Albumin 3.2 (L) 3.5 - 5.0 g/dL     Globulin 4.4 (H) 2.3 - 3.5 g/dL     A-G Ratio 0.7 (L) 1.2 - 3.5     RESPIRATORY VIRUS PANEL W/COVID-19, PCR     Collection Time: 09/20/21  9:44 PM     Specimen: Nasopharyngeal   Result Value Ref Range     Adenovirus NOT DETECTED NOTDET       Coronavirus 229E NOT DETECTED NOTDET       Coronavirus HKU1 NOT DETECTED NOTDET       Coronavirus CVNL63 NOT DETECTED NOTDET       Coronavirus OC43 NOT DETECTED NOTDET       SARS-CoV-2, PCR Detected (A) NOTDET       Metapneumovirus NOT DETECTED NOTDET       Rhinovirus and Enterovirus NOT DETECTED NOTDET       Influenza A NOT DETECTED NOTDET       Influenza B NOT DETECTED NOTDET       Parainfluenza 1 NOT DETECTED NOTDET       Parainfluenza 2 NOT DETECTED NOTDET       Parainfluenza 3 NOT DETECTED NOTDET       Parainfluenza virus 4 NOT DETECTED NOTDET       RSV by PCR NOT DETECTED NOTDET       B. parapertussis, PCR NOT DETECTED NOTDET       Bordetella pertussis - PCR NOT DETECTED NOTDET       Chlamydophila pneumoniae DNA, QL, PCR NOT DETECTED NOTDET       Mycoplasma pneumoniae DNA, QL, PCR NOT DETECTED NOTDET           Imaging /Procedures /Studies      Assessment and Plan:           Active Hospital Problems     Diagnosis Date Noted    COVID-19 2021    Acute respiratory failure with hypoxia ([facility] 2021         PLAN     # Acute hypoxic respiratory failure due to COVID  - Decadron 6 mg qdaily  - ordered remdesivir  - monitor renal/hepatic function  - check CRP, procalcitonin  - CT chest negative for PE  - wean supplemental oxygen as tolerated     F/E/N: no fluids, replete electrolytes as needed, regular diet     Ppx: Lovenox BID for VTE     Code Status: FULL CODE     Disposition: Admit to Inpatient with plan as above, will need to go to COVID floor unit at [facility] when bed is available. Discussed with patient at bedside. All questions answered.      Signed By: Dar Cuadra DO      2021           Patient Demographics    Patient Name   Baudilio Martinez  [facility]   [id number] Legal Sex   Male    1975 Address   [facility] [address] [facility] [address] Phone   [phone] (Home)   [phone] (Mobile)   CSN:   [id number]   Admit Date: Admit Time Room Bed   Sep 20, 2021  8:01 PM FT09 [129] 09 [1283]   Attending Providers    Provider Pager From To   Ad Stevenson DO  21   Celina Dobbs, DO  21   Oscar Bass,   21   Emergency Contact(s)    Name Relation Home Work Mobile   enriqueta neely Mother   [phone]   Alf Pereira Daughter   [phone]   Utilization Reviews       Viral Illness, Acute - Care Day 1 (9/20/2021) by Maliha Martinez       Review Entered Review Status   9/21/2021 16:27 Completed      Criteria Review      Care Day: 1 Care Date: 9/20/2021 Level of Care: Inpatient Floor    Guideline Day 1    Clinical Status    (X) * Clinical Indications met    9/21/2021 16:27:56 EDT by Dia Cooper 46yr old male with no sig PMH to ED c/o sob and cough x5 days. Noted to be newly positive for COVID 19. 98.8, -125, 121/72. 104.3kg    (X) Possible Hypoxemia    9/21/2021 16:27:56 EDT by Victoriano Polk      RA sat 87%, O2 at 3lpm via NC    Routes    (X) Oral or IV hydration    9/21/2021 16:27:56 EDT by Victoriano Polk      NS [id number] IVF bolus x1    (X) Parenteral or oral medications    9/21/2021 16:27:56 EDT by Victoriano Polk      Decadron 10mg IV x1    (X) Liquid or usual diet    9/21/2021 16:27:56 EDT by Victoriano Polk      Reg diet    Interventions    (X) Possible isolation    9/21/2021 16:27:56 EDT by Victoriano Polk      Droplet plus isolation    (X) CBC with differential, chemistries, renal and hepatic function testing, C-reactive protein, coagulation panel    9/21/2021 16:27:56 EDT by Victoriano Polk      CRP 19.9. Gluc 119, creat 0.70, alb 3.2, ast 56. (X) PCR for viral pathogens    9/21/2021 16:27:56 EDT by Victoriano Polk      SARS CoV-2 PCR POSITIVE    (X) ABG or oximetry    9/21/2021 16:27:56 EDT by Victoriano Polk      cont pulse ox    (X) Possible oxygen    9/21/2021 16:27:56 EDT by Victoriano Polk      O2 at 3lpm via NC    (X) Possible chest x-ray    9/21/2021 16:27:56 EDT by Dia Cooper CTA chest: no PE.  Bilateral COVID pneumonia    * Milestone   Additional Notes   PLAN       # Acute hypoxic respiratory failure due to COVID   - Decadron 6 mg qdaily   - ordered remdesivir   - monitor renal/hepatic function   - check CRP, procalcitonin   - CT chest negative for PE   - wean supplemental oxygen as tolerated         Physical Exam:   General:          Alert, cooperative, no distress, appears stated age.      Head:               Normocephalic, without obvious abnormality, atraumatic. Nose:               Nares normal. No drainage or sinus tenderness. Lungs:             Clear to auscultation bilaterally.  No Wheezing or Rhonchi. No rales. Productive cough. On NC   Heart:              Regular rate and rhythm,  +S1, +S2. Abdomen:        Soft, non-tender. Not distended.  Bowel sounds normal.    Extremities:     No cyanosis.  No edema. No clubbing   Skin:                Texture, turgor normal. No rashes or lesions.  Not Jaundiced   Neurologic:      Alert and oriented x 3, no focal deficits       Viral Illness, Acute - Clinical Indications for Admission to Inpatient Care by Petrona Mojica       Review Entered Review Status   9/21/2021 16:23 Completed      Criteria Review      Clinical Indications for Admission to Inpatient Care    Most Recent : Ricki Cadet Most Recent Date: 9/21/2021 16:23:45 EDT    (X) Admission is indicated for  1 or more  of the following  [A] [B] (1) (2) (3) (4) (5) (6) (7)    (8) (9):       (X) Pulmonary manifestation, as indicated by  1 or more  of the following :          (X) Hypoxemia          9/21/2021 16:23:45 EDT by Ricki Cadet            RA sat 87%. Req O2  at 3lpm via NC    Notes:    9/21/2021 16:23:45 EDT by Ricki Cadet    Subject: Additional Clinical Information      * 46yr old male with no PMH with 5 day history of worsening SOB, cough. Went to Urgent care eaerlier today and was swab for COVID-does not know results. Also with some mild nausea. In ED, rapid COVID neg, but RPP is positive. Hypoxic req O2. Admitted.

## 2021-09-23 NOTE — PROGRESS NOTES
Advance Care Planning   Advance Care Planning Inpatient Note  [address] [facility] Department    Today's Date: 9/23/2021  Unit: [facility] 8 INTERMEDIATE CARE UNIT    Received request from Health Care provider. Upon review of chart and communication with care team, patient's decision making abilities are not in question. Patient was/were present in the room during visit. (Pt is in Nettie Scarlet isolation. Did not attempt to visit pt and no outside visitors are usually allowed.)   Goals of ACP Conversation:  Provide ACP Paperwork ([address] [facility]) with assistance of pt's RN, Genie Godwin, for pt to review and complete, if desired. Health Care Decision Makers:      Primary Decision Maker: Diamond Lara - Mother - [phone]    Secondary Decision Maker: Gisel Orta - Daughter - [phone]      Summary:  No Advance Directives for pt's decision makers to indicate patient's health care wishes at this time (Decision makers listed are automatically pulled from pt's chart.)      Advance Care Planning Documents (Patient Wishes) on file:  None     Assessment:    After receiving consult,  reviewed pt's chart. Pt has two family members listed on chart:  His mother and daughter. Interventions:  [place] HCPOA form provided    Care Preferences Communicated:  No    Outcomes/Plan:  Follow-up, as needed to assist with questions or with completing form.     [facility] on 9/23/2021 at 2:16 PM

## 2021-09-23 NOTE — PROGRESS NOTES
Patient sleeping in bed on 12L HF NC. A&Ox4. Respirations even and unlabored. Patient denies pain and is in no acute distress at this time. No needs expressed. Call light within reach, will continue to monitor.

## 2021-09-23 NOTE — CONSULTS
Comprehensive Nutrition Assessment    Type and Reason for Visit: Initial, Consult  Poor intake/appetite 5 or more days (nurse generated)    Nutrition Recommendations/Plan:   Meals and Snacks:  Continue current diet. Nutrition Supplement Therapy:   Medical food supplement therapy:  Continue Ensure Enlive three times per day (this provides 350 kcal and 20 grams protein per bottle)     Malnutrition Assessment:  Malnutrition Status: At risk for malnutrition (specify) (pt reports x4 days poor po intake (9/23))    Nutrition Assessment:   Nutrition History: Pt states poor po intake since being at the hospital (4 days total--2 days at [facility] before transfer) due to loss of appetite. Pt reports several events of vomiting PTA. UBW of 230lbs stated by pt. No hx available per chart review. Nutrition Background: Pt with no notable PMH. Pt admitted due to PNA due to COVID-19. Daily Update:  Pt contacted via cell phone due to isolation precautions. Pt states drinking 1/2 of Ensure this morning and starting to drink one from lunch. RN reports pt has received some Ensure Enlive during admission. Noted one documented supplement intake. Encouraged pt to continue drinking Ensure and at least eat protein at meals. Pt denies having nausea. Informed pt of PRN Zofran if pt feels nauseous prior to meals to help encourage po intake. Nutrition Related Findings:   NFPE deferred due to isolation precautions for COVID. Current Nutrition Therapies:  ADULT DIET Regular; 3 carb choices (45 gm/meal)  ADULT ORAL NUTRITION SUPPLEMENT Breakfast, Lunch, Dinner; Standard High Calorie/High Protein    Current Intake:   Average Meal Intake: 0% Average Supplement Intake: 51-75%      Anthropometric Measures:  Height: 5' 9\" (175.3 cm)  Current Body Wt: 104.3 kg (229 lb 15 oz) (9/20), Weight source: Stated  BMI: 33.9, Obese class 1 (BMI 30.0-34. 9)  Admission Body Weight: 229 lb 15 oz (stated per pt 9/20)  Ideal Body Weight (lbs) (Calculated): 160 lbs (73 kg), 143.7 %  Usual Body Wt: 104.3 kg (230 lb) (stated per pt; no hx per chart review), Percent weight change: 0          Edema: No data recorded   Estimated Daily Nutrient Needs:  Energy (kcal/day): 5645-7325 (Kcal/kg (25-30), Weight Used: Ideal (72.7kg))  Protein (g/day): 73-87 (1-1.2gm/kg) Weight Used: (Ideal (72.7kg))  Fluid (ml/day):   (1 ml/kcal)    Nutrition Diagnosis:   · Predicted inadequate energy intake related to  (loss of appetite) as evidenced by  (pt stated barrier to intake)    Nutrition Interventions:   Food and/or Nutrient Delivery: Continue oral nutrition supplement, Continue current diet     Coordination of Nutrition Care: Continue to monitor while inpatient  Plan of Care discussed with Aidee Metcalf RN    Goals: Active Goal: Meet >75% estimated nutrition needs by RD follow up. Nutrition Monitoring and Evaluation:      Food/Nutrient Intake Outcomes: Food and nutrient intake, Supplement intake  Physical Signs/Symptoms Outcomes: Biochemical data, GI status    Discharge Planning:     Too soon to determine    Electronically signed by Carisa Santana MS, RDN, LD 9/23/2021 at 2:13 PM  Contact: [phone]    Disaster Mode Active

## 2021-09-23 NOTE — PROGRESS NOTES
Hospitalist Progress Note   Admit Date:  2021  6:44 PM   Name:  Shiloh Polanco. Age:  40 y.o. Sex:  male  :  1975   MRN:  [id number]   Room:  [facility]/    Presenting Complaint: No chief complaint on file. Reason(s) for Admission: Pneumonia due to COVID-19 virus [U07.1, J12.82]     Hospital Course & Interval History:   41 yo CM with no past medical history presented with a 5 day history of worsening cough and shortness of breath. He went to [facility] prior to coming to ER and was swabbed for COVID. No known COVID contacts. He has had some mild nausea but denies any other GI issues including diarrhea. No joint or muscle aches. Denies fevers/chills. Denies loss of taste/smell. Has not been vaccinated. Denies chest pain. Has not noticed any swelling or redness in his legs. Subjective (21): Was on 10L yesterday morning; now on 12L with SpO2 93%. Patient not in any distress during encounter. He has no new complaints. Afebrile. Check labs in the a.m. Assessment & Plan:     # Acute hypoxic respiratory failure due to COVID  - Decadron 6 mg qd Day 4  - Remdesivir Day 4  - monitor renal/hepatic function  - CT chest negative for PE  - wean supplemental oxygen as tolerated   - remains on 4L NC. CRP is 19. Will repeat IN AM. May qualify for baricinitib vs actemra.   Sep/22: CRP now 8.0 from 19.9  Sep/23: O2 requirements have increased; continue close monitoring     # Obesity Class 1  - BMI 33.97  - Adds to complexity of care      Dispo/Discharge Planning: > 2 midnights    Diet:  ADULT DIET Regular; 3 carb choices (45 gm/meal)  DVT PPx: enoxaparin  Code status: Full Code    Hospital Problems as of 2021 Never Reviewed        Codes Class Noted - Resolved POA    * (Principal) Pneumonia due to COVID-19 virus ICD-10-CM: U07.1, J12.82  ICD-9-CM: 480.8, 079.89  2021 - Present Unknown              Objective:     Patient Vitals for the past 24 hrs:   Temp Pulse Resp BP SpO2 09/23/21 0736 97.9 °F (36.6 °C) (!) 102 20 117/79 91 %   09/23/21 0317 98 °F (36.7 °C) 98 22 115/78 93 %   09/22/21 2219 98 °F (36.7 °C) 94 21 121/80 91 %   09/22/21 1926 97.9 °F (36.6 °C) 87 20 117/80 92 %   09/22/21 1734 -- -- -- -- 92 %   09/22/21 1451 98 °F (36.7 °C) 96 20 127/77 92 %   09/22/21 1411 -- -- -- -- 90 %   09/22/21 1331 -- -- -- -- 95 %   09/22/21 1118 98.1 °F (36.7 °C) 95 20 122/75 94 %     Oxygen Therapy  O2 Sat (%): 91 % (09/23/21 0736)  O2 Device: Hi flow nasal cannula (09/23/21 0752)  Skin Assessment: Clean, dry, & intact (09/23/21 0317)  O2 Flow Rate (L/min): 12 l/min (09/23/21 0752)    Estimated body mass index is 33.97 kg/m² as calculated from the following:    Height as of 9/20/21: 5' 9\" (1.753 m). Weight as of 9/20/21: 104.3 kg (230 lb). Intake/Output Summary (Last 24 hours) at 9/23/2021 1443  Last data filed at 9/23/2021 0736  Gross per 24 hour   Intake 360 ml   Output 800 ml   Net -440 ml         Physical Exam:   General:    No overt distress  Head:  Normocephalic, atraumatic  Eyes:  Sclerae appear normal.  Pupils equally round. ENT:  Nares appear normal, no drainage. Moist oral mucosa  Neck:  No restricted ROM. Trachea midline   CV:   RRR. No m/r/g. Lungs:   Respirations even, unlabored at rest on 12 L. Abdomen:   nontender, nondistended. Extremities: No cyanosis or clubbing. Skin:     No rashes and normal coloration. Warm and dry. Neuro:  Cranial nerves II-XII grossly intact. Psych:  Normal mood and affect. Alert and oriented x3    I have reviewed ordered lab tests and independently visualized imaging below:    Last 24hr Labs:  No results found for this or any previous visit (from the past 24 hour(s)). All Micro Results     None          Other Studies:  No results found.     Current Meds:  Current Facility-Administered Medications   Medication Dose Route Frequency    enoxaparin (LOVENOX) injection 40 mg  40 mg SubCUTAneous DAILY    remdesivir 100 mg in 0.9% sodium chloride 250 mL IVPB  100 mg IntraVENous Q24H    sodium chloride (NS) flush 5-40 mL  5-40 mL IntraVENous Q8H    sodium chloride (NS) flush 5-40 mL  5-40 mL IntraVENous PRN    acetaminophen (TYLENOL) tablet 650 mg  650 mg Oral Q6H PRN    Or    acetaminophen (TYLENOL) suppository 650 mg  650 mg Rectal Q6H PRN    polyethylene glycol (MIRALAX) packet 17 g  17 g Oral DAILY PRN    ondansetron (ZOFRAN ODT) tablet 4 mg  4 mg Oral Q8H PRN    Or    ondansetron (ZOFRAN) injection 4 mg  4 mg IntraVENous Q6H PRN    dexamethasone (DECADRON) 10 mg/mL injection 6 mg  6 mg IntraVENous Q24H       Signed:  Raegan Leahy NP    Part of this note may have been written by using a voice dictation software. The note has been proof read but may still contain some grammatical/other typographical errors.

## 2021-09-23 NOTE — PROGRESS NOTES
Patient resting in bed on 12L HF NC. Respirations even and unlabored. No acute events overnight. No needs expressed. Call light within reach, will continue to monitor. Preparing to give report to oncoming RN.

## 2021-09-23 NOTE — ADT AUTH CERT NOTES
INPATIENT ADMISSION NOTIF     ** CORRECTED** PLEASE DISREGARD PREVIOUS FAX     ADMISSION DATE 2021    UR CONTACT     BC HAYDEN   UR [phone]  UR Tonya [address] [phone]    Kostas Harris! [address] [facility]     FACILITY NPI :[id number]  FACILITY TAX ID : [id number]     [facility] 8 INTERMEDIATE CARE UNIT  ONE  [address] [place] [address]  [phone]            Patient Name :Bogdan Silva.  : 1975 (46 yrs)  MRN : [id number]     Patient Mailing Address [facility] nu*                                          [address] , [address]                                                        .         Insurance Plan Payor: [employer] / Plan: [address] / Product Type: PPO /      Primary Coverage Subscriber ID : [id number]         Current Patient Class : INPATIENT  Admit Date : 2021     REQUESTED LEVEL OF CARE: INPATIENT [101]                                                           Diagnosis : Pneumonia due to COVID-19 virus                          ICD10 Code : Pneumonia due to COVID-19 virus [U07.1, J12.82]          Admitting and Attending Info:  Admitting Provider : Dre Garcia MD   NPI: [id number]  Admitting Provider Phone. [phone] Provider Address: [place] [address]. Name: [address] [facility]                               Patient Demographics    Patient Name   [address] Aniket JONES Sex   Male    1975 Address   [facility] [address] [facility] [address] Phone   [phone] [facility]   [phone] [facility]   Hospital Account    Name Acct ID Class Status Primary Coverage   Alexis Jobs [id number] INPATIENT Open BLUE CROSS - [address]          Guarantor Account (for Hospital Account [de-identified])    Name Relation to Pt Service Area Active? Acct Type   Alexis Jobs.  Self BONSC Yes Personal/Family   Address Phone     [address] [place] [address] [place] [address] [phone](Q)            Coverage Information (for Hospital Account [de-identified])    F/O Payor/Plan Subscriber  Subscriber Sex Precert #   BEVERLEY FOWLER/SC Tolu FOWLER [facility] [birth year] M    Subscriber Subscriber #   Katlin Amaral [id number]   Grp # Group Name   [id number]    Address Phone   [address] [facility], [address] [facility]    Policy Number Status Effective Date Benefits Phone   [id number] -  -   Auth/Cert             Admission Information    Arrival Date/Time:  Admit Date/Time: 2021 IP Adm. Date/Time: 2021   Admission Type: Elective Point of Origin: Other Type Of [address] Category:    Means of Arrival:  Primary Service: Medicine Secondary Service: N/A   Transfer Source:  Service Area: [facility] Unit: [facility] INTERMEDIATE CARE UNIT   Admit Provider: Renetta Queen MD Attending Provider: Gali Lebron MD Referring Provider:    Admission Information    Attending Provider Admission Dx Admitted on   Myah Mancera MD Pneumonia due to COVID-19 virus 21   Service Isolation Code Status   MEDICINE Droplet Plus Full Code   Allergies Advance Care Planning    Pcn [Penicillins] Jump to the Activity     Admission Information    Unit/Bed: [facility] 8 INTERMEDIATE/ Service: MEDICINE   Admitting provider: Renetta Queen MD Phone: [phone]   Attending provider: Myah Mancera MD Phone: [phone]   PCP: Akilah Soriano MD Phone:    Admission dx: respiratory failure due to covid  Patient class: I   Admission type: EL     Patient Demographics    Patient Name   Katlin Zhao  [facility]   [id number] Legal Sex   Male    1975 Address   [address] [place] [address] [address] Phone   [phone] [facility])   [phone] (Mobile)   H&P Notes     H&P by Rabia Castillo DO at 21 2228 documented on Admission (Current) from 2021 in [facility] 8 INTERMEDIATE CARE UNIT  Author: Nydia Goodman DO Author Type: Physician Filed: 21   Note Status: Signed Cosign: Cosign Not Required Date of Service: 21   : Nydia Goodman DO (Physician)         Patient transferred for [facility] for covid protocol. See  note for assessment/plan. No change in history.        H&P by Simon He DO at 21 documented on ED from 2021 in [facility] EMERGENCY DEPT  Author: Simon He DO Author Type: Physician Filed: 214   Note Status: Signed Cosign: Cosign Not Required Date of Service: 21   : Simon He DO (Physician)   Expand AllCollapse All               HOSPITALIST H&P/CONSULT  NAME:            Tuan Crawley. Age:                40 y.o.  :               1975   MRN:               [id number]  PCP: Doug Palumbo MD  Consulting MD:  Treatment Team: Attending Provider: Simon He DO; Primary Nurse: Riley Lux RN  HPI:      39 yo CM with no past medical history presents with a 5 day history of worsening cough and shortness of breath. He went to [facility] earlier today and was swabbed for COVID but does not know the results. No known COVID contacts. He has had some mild nausea but denies any other GI issues including diarrhea. No joint or muscle aches. Denies fevers/chills. Denies loss of taste/smell. Has not been vaccinated. Denies chest pain. Has not noticed any swelling or redness in his legs.      PMHx: none  PSHx; reviewed and noncontributory  Social Hx:  reviewed and noncontributory  Family Hx:  reviewed and noncontributory     ED course: Rapid COVID is negative but RPP is positive. CXR is normal. CT chest negative for PE but does show bilateral infiltrates. Given fluid bolus, Decadron 10 mg IV once. Desatted to high 80s on RA, placed on NC to maintain O2 levels. Hospitalist consulted for admission.       Complete ROS done and is as stated in HPI or otherwise negative  History reviewed. No pertinent past medical history. Past Surgical History:   Procedure Laterality Date    HX APPENDECTOMY          None           Allergies   Allergen Reactions    Pcn [Penicillins] Hives      Social History           Tobacco Use    Smoking status: Former Smoker    Smokeless tobacco: Current User    Tobacco comment: vapes   Substance Use Topics    Alcohol use: Not Currently      History reviewed. No pertinent family history. Objective:      Visit Vitals  /77   Pulse 98   Temp 98.8 °F (37.1 °C)   Resp 16   Ht 5' 9\" (1.753 m)   Wt 104.3 kg (230 lb)   SpO2 96%   BMI 33.97 kg/m²      Temp (24hrs), Av.8 °F (37.1 °C), Min:98.8 °F (37.1 °C), Max:98.8 °F (37.1 °C)     Oxygen Therapy  O2 Sat (%): 96 % (21)  Pulse via Oximetry: 97 beats per minute (21)  O2 Device: Nasal cannula (21)  O2 Flow Rate (L/min): 4 l/min (21)  Physical Exam:  General:          Alert, cooperative, no distress, appears stated age. Head:               Normocephalic, without obvious abnormality, atraumatic. Nose:               Nares normal. No drainage or sinus tenderness. Lungs:             Clear to auscultation bilaterally. No Wheezing or Rhonchi. No rales. Productive cough. On NC  Heart:              Regular rate and rhythm,  +S1, +S2. Abdomen:        Soft, non-tender. Not distended. Bowel sounds normal.   Extremities:     No cyanosis. No edema. No clubbing  Skin:                Texture, turgor normal. No rashes or lesions.   Not Jaundiced  Neurologic:      Alert and oriented x 3, no focal deficits   Data Review:   Recent Results         Recent Results (from the past 24 hour(s))   COVID-19 RAPID TEST     Collection Time: 21  6:10 PM   Result Value Ref Range     Specimen source Nasopharyngeal       COVID-19 rapid test Not detected NOTD     CBC WITH AUTOMATED DIFF     Collection Time: 21  6:23 PM   Result Value Ref Range     WBC 8.2 4.3 - 11.1 K/uL     RBC 5.80 (H) 4.23 - 5.6 M/uL     HGB 16.3 13.6 - 17.2 g/dL     HCT 49.0 41.1 - 50.3 %     MCV 84.5 79.6 - 97.8 FL     MCH 28.1 26.1 - 32.9 PG     MCHC 33.3 31.4 - 35.0 g/dL     RDW 13.2 11.9 - 14.6 %     PLATELET 203 375 - 948 K/uL     MPV 9.6 9.4 - 12.3 FL     ABSOLUTE NRBC 0.00 0.0 - 0.2 K/uL     DF AUTOMATED       NEUTROPHILS 80 (H) 43 - 78 %     LYMPHOCYTES 9 (L) 13 - 44 %     MONOCYTES 10 4.0 - 12.0 %     EOSINOPHILS 0 (L) 0.5 - 7.8 %     BASOPHILS 0 0.0 - 2.0 %     IMMATURE GRANULOCYTES 0 0.0 - 5.0 %     ABS. NEUTROPHILS 6.6 1.7 - 8.2 K/UL     ABS. LYMPHOCYTES 0.8 0.5 - 4.6 K/UL     ABS. MONOCYTES 0.8 0.1 - 1.3 K/UL     ABS. EOSINOPHILS 0.0 0.0 - 0.8 K/UL     ABS. BASOPHILS 0.0 0.0 - 0.2 K/UL     ABS. IMM. GRANS. 0.0 0.0 - 0.5 K/UL   METABOLIC PANEL, COMPREHENSIVE     Collection Time: 09/20/21  6:23 PM   Result Value Ref Range     Sodium 138 136 - 145 mmol/L     Potassium 3.8 3.5 - 5.1 mmol/L     Chloride 101 98 - 107 mmol/L     CO2 29 21 - 32 mmol/L     Anion gap 8 7 - 16 mmol/L     Glucose 119 (H) 65 - 100 mg/dL     BUN 13 6 - 23 MG/DL     Creatinine 0.70 (L) 0.8 - 1.5 MG/DL     GFR est AA >60 >60 ml/min/1.73m2     GFR est non-AA >60 >60 ml/min/1.73m2     Calcium 8.6 8.3 - 10.4 MG/DL     Bilirubin, total 0.5 0.2 - 1.1 MG/DL     ALT (SGPT) 29 12 - 65 U/L     AST (SGOT) 56 (H) 15 - 37 U/L     Alk.  phosphatase 94 50 - 136 U/L     Protein, total 7.6 6.3 - 8.2 g/dL     Albumin 3.2 (L) 3.5 - 5.0 g/dL     Globulin 4.4 (H) 2.3 - 3.5 g/dL     A-G Ratio 0.7 (L) 1.2 - 3.5     RESPIRATORY VIRUS PANEL W/COVID-19, PCR     Collection Time: 09/20/21  9:44 PM     Specimen: Nasopharyngeal   Result Value Ref Range     Adenovirus NOT DETECTED NOTDET       Coronavirus 229E NOT DETECTED NOTDET       Coronavirus HKU1 NOT DETECTED NOTDET       Coronavirus CVNL63 NOT DETECTED NOTDET       Coronavirus OC43 NOT DETECTED NOTDET       SARS-CoV-2, PCR Detected (A) NOTDET       Metapneumovirus NOT DETECTED NOTDET       Rhinovirus and Enterovirus NOT DETECTED NOTDET       Influenza A NOT DETECTED NOTDET       Influenza B NOT DETECTED NOTDET       Parainfluenza 1 NOT DETECTED NOTDET       Parainfluenza 2 NOT DETECTED NOTDET       Parainfluenza 3 NOT DETECTED NOTDET       Parainfluenza virus 4 NOT DETECTED NOTDET       RSV by PCR NOT DETECTED NOTDET       B. parapertussis, PCR NOT DETECTED NOTDET       Bordetella pertussis - PCR NOT DETECTED NOTDET       Chlamydophila pneumoniae DNA, QL, PCR NOT DETECTED NOTDET       Mycoplasma pneumoniae DNA, QL, PCR NOT DETECTED NOTDET           Imaging /Procedures /Studies      Assessment and Plan:           Active Hospital Problems     Diagnosis Date Noted    COVID-19 2021    Acute respiratory failure with hypoxia ([facility] [address] 2021         PLAN     # Acute hypoxic respiratory failure due to COVID  - Decadron 6 mg qdaily  - ordered remdesivir  - monitor renal/hepatic function  - check CRP, procalcitonin  - CT chest negative for PE  - wean supplemental oxygen as tolerated     F/E/N: no fluids, replete electrolytes as needed, regular diet     Ppx: Lovenox BID for VTE     Code Status: FULL CODE     Disposition: Admit to Inpatient with plan as above, will need to go to COVID floor unit at [facility] when bed is available. Discussed with patient at bedside. All questions answered.      Signed By: Lance Ho DO      2021           Patient Demographics    Patient Name   Ren Taveras  [facility]   [id number] Legal Sex   Male    1975 Address   [facility] [address] [facility] [address] Phone   [phone] [facility])   [phone] (Mobile)   CSN:   [id number]   Admit Date: Admit Time Room Bed   Sep 21, 2021  6:44  [855] 01 [[id number]]   Attending Providers    Provider Pager From To   Mishel Albrecht MD  21   Yonathan Howell MD  21   Colette Vyas MD  21    Emergency Contact(s)    Name Relation Home Work Mobile   enriqueta neely   [phone] Db Zendejas   [phone]   Utilization Reviews       COVID 19 results by Latoya Anderson RN       Review Entered Review Status   9/22/2021 15:11 In Primary      Criteria Review   Is the illness suspected to be related to the Coronavirus (COVID-19)? Yes  Yes, No or Other  Has the member been tested for the COVID-19? Yes  Yes or No  If Yes, what are the results of the COVID-19? Positive                    Positive, Negative or Pending  What is the severity of the members condition (i.e. Isolation, Ventilator use)?  Isolation     RESPIRATORY VIRUS PANEL W/COVID-19, PCR  Order: [id number]  Collected:  9/20/2021 21:44 Status:  Final result  Specimen Information: Nasopharyngeal          0 Result Notes    Ref Range & Units 9/20/21 2144   Adenovirus NOTDET   NOT DETECTED    Coronavirus 229E NOTDET   NOT DETECTED    Coronavirus HKU1 NOTDET   NOT DETECTED    Coronavirus CVNL63 NOTDET   NOT DETECTED    Coronavirus OC43 NOTDET   NOT DETECTED    SARS-CoV-2, PCR NOTDET   DetectedAbnormal                 Viral Illness, Acute - Care Day 2 (9/22/2021) by Latoya Anderson RN       Review Entered Review Status   9/22/2021 15:08 Completed      Criteria Review      Care Day: 2 Care Date: 9/22/2021 Level of Care: Inpatient Floor    Guideline Day 2    Level Of Care    (X) Floor    9/22/2021 15:08:35 EDT by Immusoft      floor    Clinical Status    ( ) * Hypotension absent    ( ) * No requirement for mechanical ventilation    ( ) * Oxygenation at baseline or improved    ( ) * Mental status at baseline    Activity    (X) Advance activity as tolerated    9/22/2021 15:08:35 EDT by AdlerSunnyloft      Activity as tolerated as assist    Routes    ( ) * Oral hydration    Interventions    (X) Possible isolation    9/22/2021 15:08:35 EDT by AdlerSunnyloft      Isolation    Medications    (X) Possible DVT prophylaxis    9/22/2021 15:08:35 EDT by AdlerSunnyloft      Lovenox SC    * Milestone   Additional Notes Presenting Complaint: No chief complaint on file.       Reason(s) for Admission: Pneumonia due to COVID-19 virus [U07.1, J12.82]        Hospital Course & Interval History:   41 yo CM with no past medical history presented with a 5 day history of worsening cough and shortness of breath. He went to [facility] prior to coming to ER and was swabbed for COVID.  No known [facility] contacts. [facility] has had some mild nausea but denies any other GI issues including diarrhea. No joint or muscle aches. Denies fevers/chills. Denies loss of taste/smell. Has not been vaccinated. Denies chest pain. Has not noticed any swelling or redness in his legs.        Subjective (09/22/21):   No AEO.  Now on 10 Lpm.  Even and unlabored during encounter.  No new complaints.       Assessment & Plan:       # Acute hypoxic respiratory failure due to COVID   - Decadron 6 mg qd Day 3   - Remdesivir Day 3   - monitor renal/hepatic function   - CT chest negative for PE   - wean supplemental oxygen as tolerated   9/21 - remains on 4L NC. CRP is 19. Will repeat IN AM. May qualify for baricinitib vs actemra.    Sep/22: CRP now 8.0 from 19.9       # Obesity Class 1   - BMI 33.97           Dispo/Discharge Planning: > 2 midnights       Diet:  ADULT DIET Regular; 3 carb choices (45 gm/meal)   DVT PPx: enoxaparin   Code status: Full Code       Hospital Problems as of 9/22/2021 Never Reviewed     Codes Class Noted - Resolved POA     * (Principal) Pneumonia due to COVID-19 virus ICD-10-CM: U07.1, J12.82   ICD-9-CM: 480.8, 079.89   9/21/2021 - Present Unknown                     Objective:       Patient Vitals for the past 24 hrs:     Temp Pulse Resp BP SpO2   09/22/21 1118 98.1 °F (36.7 °C) 95 20 122/75 94 %   09/22/21 0706 97.5 °F (36.4 °C) 96 21 101/69 95 %   09/22/21 0359 97.9 °F (36.6 °C) 88 20 111/70 93 %   09/21/21 2246 98 °F (36.7 °C) 80 20 99/65 94 %   09/21/21 1939 97.7 °F (36.5 °C) (!) 102 20 119/74 92 %       Oxygen Therapy   O2 Sat (%): 94 % (09/22/21 1118)   O2 Device: Nasal cannula (09/22/21 0706)   O2 Flow Rate (L/min): 10 l/min (09/22/21 0706)       Estimated body mass index is 33.97 kg/m² as calculated from the following:     Height as of 9/20/21: 5' 9\" (1.753 m).     Weight as of 9/20/21: 104.3 kg (230 lb).     Intake/Output Summary (Last 24 hours) at 9/22/2021 1129   Last data filed at 9/22/2021 1009   Gross per 24 hour   Intake 360 ml   Output -   Net 360 ml            Physical Exam:    General:          No overt distress   Head:               Normocephalic, atraumatic   Eyes:               Sclerae appear normal.  Pupils equally round. ENT:                Nares appear normal, no drainage.  Moist oral mucosa   Neck:               No restricted ROM.  Trachea midline    CV:                  RRR.  No m/r/g. Lungs:             Respirations even, unlabored during encounter.  On 10 lpm.   Abdomen:        Soft, nontender, nondistended.    Extremities:     No cyanosis or clubbing.     Skin:                No rashes and normal coloration.   Warm and dry.     Neuro:             Cranial nerves II-XII grossly intact.      Psych:             Normal mood and affect.  Alert and oriented x3       I have reviewed ordered lab tests and independently visualized imaging below:       Last 24hr Labs:   Recent Results   Recent Results (from the past 24 hour(s))   PROCALCITONIN     Collection Time: 09/21/21  8:01 PM   Result Value Ref Range     Procalcitonin <6.60 ng/mL   METABOLIC PANEL, COMPREHENSIVE     Collection Time: 09/22/21  7:22 AM   Result Value Ref Range     Sodium 143 138 - 145 mmol/L     Potassium 3.9 3.5 - 5.1 mmol/L     Chloride 108 (H) 98 - 107 mmol/L     CO2 26 21 - 32 mmol/L     Anion gap 9 7 - 16 mmol/L     Glucose 89 65 - 100 mg/dL     BUN 23 6 - 23 MG/DL     Creatinine 0.62 (L) 0.8 - 1.5 MG/DL     GFR est AA >60 >60 ml/min/1.73m2     GFR est non-AA >60 >60 ml/min/1.73m2     Calcium 8.4 8.3 - 10.4 MG/DL     Bilirubin, total 0.5 0.2 - 1.1 MG/DL   ALT (SGPT) 25 12 - 65 U/L     AST (SGOT) 46 (H) 15 - 37 U/L     Alk. phosphatase 91 50 - 136 U/L     Protein, total 6.6 6.3 - 8.2 g/dL     Albumin 2.6 (L) 3.5 - 5.0 g/dL     Globulin 4.0 (H) 2.3 - 3.5 g/dL     A-G Ratio 0.7 (L) 1.2 - 3.5     CBC WITH AUTOMATED DIFF     Collection Time: 09/22/21  7:22 AM   Result Value Ref Range     WBC 8.3 4.3 - 11.1 K/uL     RBC 5.36 4.23 - 5.6 M/uL     HGB 15.2 13.6 - 17.2 g/dL     HCT 46.2 41.1 - 50.3 %     MCV 86.2 79.6 - 97.8 FL     MCH 28.4 26.1 - 32.9 PG     MCHC 32.9 31.4 - 35.0 g/dL     RDW 13.6 11.9 - 14.6 %     PLATELET 303 239 - 779 K/uL     MPV 9.5 9.4 - 12.3 FL     ABSOLUTE NRBC 0.00 0.0 - 0.2 K/uL     DF AUTOMATED      NEUTROPHILS 78 43 - 78 %     LYMPHOCYTES 9 (L) 13 - 44 %     MONOCYTES 12 4.0 - 12.0 %     EOSINOPHILS 0 (L) 0.5 - 7.8 %     BASOPHILS 0 0.0 - 2.0 %     IMMATURE GRANULOCYTES 1 0.0 - 5.0 %     ABS. NEUTROPHILS 6.5 1.7 - 8.2 K/UL     ABS. LYMPHOCYTES 0.8 0.5 - 4.6 K/UL     ABS. MONOCYTES 1.0 0.1 - 1.3 K/UL     ABS. EOSINOPHILS 0.0 0.0 - 0.8 K/UL     ABS. BASOPHILS 0.0 0.0 - 0.2 K/UL     ABS. IMM.  GRANS. 0.0 0.0 - 0.5 K/UL   MAGNESIUM     Collection Time: 09/22/21  7:22 AM   Result Value Ref Range     Magnesium 2.5 (H) 1.8 - 2.4 mg/dL   C REACTIVE PROTEIN, QT     Collection Time: 09/22/21  7:22 AM   Result Value Ref Range     C-Reactive protein 8.0 (H) 0.0 - 0.9 mg/dL   D DIMER     Collection Time: 09/22/21  7:22 AM   Result Value Ref Range     D DIMER 0.63 (H) <0.56 ug/ml(FEU)              All Micro Results      None               Other Studies:   No results found.       Current Meds:   Current Facility-Administered Medications   Medication Dose Route Frequency   · [START ON 9/23/2021] enoxaparin (LOVENOX) injection 40 mg 40 mg SubCUTAneous DAILY   · remdesivir 100 mg in 0.9% sodium chloride 250 mL IVPB 100 mg IntraVENous Q24H   · sodium chloride (NS) flush 5-40 mL 5-40 mL IntraVENous Q8H   · sodium chloride (NS) flush 5-40 mL 5-40 mL IntraVENous PRN · acetaminophen (TYLENOL) tablet 650 mg 650 mg Oral Q6H PRN     Or   · acetaminophen (TYLENOL) suppository 650 mg 650 mg Rectal Q6H PRN   · polyethylene glycol (MIRALAX) packet 17 g 17 g Oral DAILY PRN   · ondansetron (ZOFRAN ODT) tablet 4 mg 4 mg Oral Q8H PRN     Or   · ondansetron (ZOFRAN) injection 4 mg 4 mg IntraVENous Q6H PRN   · dexamethasone (DECADRON) 10 mg/mL injection 6 mg 6 mg IntraVENous Q24H                 Viral Illness, Acute - Care Day 1 (9/21/2021) by Theresa Mcfarlane RN       Review Entered Review Status   9/22/2021 11:02 Completed      Criteria Review      Care Day: 1 Care Date: 9/21/2021 Level of Care: Inpatient Floor    Guideline Day 1    Level Of Care    (X) ICU or floor    9/22/2021 10:49:07 EDT by Hai Bhatti      floor    Clinical Status    (X) * Clinical Indications met    9/22/2021 10:49:07 EDT by Hai Bhatti      39 y/o admitted with pneumonia due to COVID 19    Interventions    (X) Possible isolation    9/22/2021 10:49:07 EDT by Hai Bhatti      Isolation    (X) CBC with differential, chemistries, renal and hepatic function testing, C-reactive protein, coagulation panel    Medications    (X) Possible antiviral medication    9/22/2021 11:02:05 EDT by Hai Bhatti      Remdesivir    * Milestone   Additional Notes   Patient transferred to [facility]      39 yo CM with no past medical history presents with a 5 day history of worsening cough and shortness of breath. He went to [facility] earlier today and was swabbed for COVID but does not know the results. No known COVID contacts. He has had some mild nausea but denies any other GI issues including diarrhea. No joint or muscle aches. Denies fevers/chills. Denies loss of taste/smell. Has not been vaccinated. Denies chest pain.  Has not noticed any swelling or redness in his legs.        PMHx: none   PSHx; reviewed and noncontributory   Social Hx:  reviewed and noncontributory   Family Hx:  reviewed and noncontributory       ED course: Rapid COVID is negative but RPP is positive. CXR is normal. CT chest negative for PE but does show bilateral infiltrates. Given fluid bolus, Decadron 10 mg IV once. Desatted to high 80s on RA, placed on NC to maintain O2 levels. Hospitalist consulted for admission. Subjective (09/21/21): Patient sleeping on my arrival, easily awakens. Says he still is having dyspnea and cough. Remains on 4L nc.        Assessment & Plan:       # Acute hypoxic respiratory failure due to COVID   - Decadron 6 mg qdaily D2   - Remdesivir D2   - monitor renal/hepatic function   - CT chest negative for PE   - wean supplemental oxygen as tolerated   9/21 - remains on 4L NC. CRP is 19.  Will repeat IN AM. May qualify for baricinitib vs actemra.        # BMI 33       F/E/N: no fluids, replete electrolytes as needed, regular diet         Dispo/Discharge Planning:       Pending clinical improvement       Diet:  No diet orders on file   DVT PPx: lovenox bid   Code status: Full Code      Objective:       Patient Vitals for the past 24 hrs:     Pulse Resp BP SpO2   09/21/21 1802 99 20 127/72 92 %   09/21/21 1600 - - - 95 %   09/21/21 0803 89 - 135/71 93 %   09/21/21 0733 92 - 119/76 95 %   09/21/21 0703 87 - 119/69 91 %   09/21/21 0702 88 - - 92 %   09/21/21 0650 87 - - 91 %   09/21/21 0640 95 - - 91 %   09/21/21 0633 94 - 120/77 91 %   09/21/21 0603 92 - 118/75 93 %   09/21/21 0602 89 - - 95 %   09/21/21 0533 95 - 128/73 93 %   09/21/21 0503 95 18 127/77 92 %   09/21/21 0436 90 - - 90 %   09/21/21 0433 94 30 117/75 96 %   09/21/21 0355 90 - - 92 %   09/21/21 0333 93 - 119/67 93 %   09/21/21 0303 97 22 120/75 90 %   09/21/21 0233 90 - 123/67 90 %   09/21/21 0203 100 28 119/74 94 %   09/21/21 0133 97 - 120/73 94 %   09/21/21 0109 98 - - 96 %   09/21/21 0103 (!) 101 - 108/71 90 %   09/21/21 0033 (!) 102 - 124/77 (!) 84 %   09/21/21 0014 97 - - 100 %   09/21/21 0003 (!) 102 - 126/69 (!) 89 %   09/20/21 2333 98 - 120/77 93 %   09/20/21 2303 (!) 101 - 128/71 93 %   09/20/21 2233 - - 124/73 93 %       Oxygen Therapy   O2 Sat (%): 92 % (09/21/21 1802)   Pulse via Oximetry: 99 beats per minute (09/21/21 1802)   O2 Device: Nasal cannula (09/20/21 2151)   O2 Flow Rate (L/min): 4 l/min (09/21/21 0109)       Estimated body mass index is 33.97 kg/m² as calculated from the following:     Height as of this encounter: 5' 9\" (1.753 m).     Weight as of this encounter: 104.3 kg (230 lb).     Intake/Output Summary (Last 24 hours) at 9/21/2021 2224   Last data filed at 9/20/2021 2248   Gross per 24 hour   Intake 1000 ml   Output -   Net 1000 ml            Physical Exam:        General:          Well nourished.  No overt distress   Head:               Normocephalic, atraumatic   Eyes:               Sclerae appear normal.  Pupils equally round. ENT:                Nares appear normal, no drainage.  Moist oral mucosa   Neck:               No restricted ROM.  Trachea midline    CV:                  RRR.  No m/r/g.  No jugular venous distension. Lungs:             diminished  No wheezing, rhonchi, or rales.  Respirations even, unlabored   Abdomen:        Bowel sounds present.  Soft, nontender, nondistended.    Extremities:     No cyanosis or clubbing.  No edema   Skin:                No rashes and normal coloration.   Warm and dry.     Neuro:             Cranial nerves II-XII grossly intact.   Sensation intact   Psych:             Normal mood and affect.  Alert and oriented x3              Medications,   acetaminophen (TYLENOL) tablet 650 mg    Dose: 650 mg   Freq: EVERY 6 HOURS AS NEEDED Route: PO   PRN Reasons: Mild Pain,Fever      remdesivir 200 mg in 0.9% sodium chloride 250 mL IVPB    Dose: 200 mg   Freq: ONCE Route: IV   Start: 09/21/21 2000 End: 09/21/21 2147      Viral Illness, Acute - Clinical Indications for Admission to Inpatient Care by Addis Meehan RN       Review Entered Review Status   9/22/2021 10:45 Completed      Criteria Review Clinical Indications for Admission to Inpatient Care    Most Recent : Anders Henry Most Recent Date: 9/22/2021 10:45:30 EDT    (X) Admission is indicated for  1 or more  of the following  [A] [B] (1) (2) (3) (4) (5) (6) (7)    (8) (9):       (X) Pulmonary manifestation, as indicated by  1 or more  of the following :          (X) Hypoxemia          9/22/2021 10:45:30 EDT by Anders Henry            shortness of breath

## 2021-09-23 NOTE — ACP (ADVANCE CARE PLANNING)
Advance Care Planning   Advance Care Planning Inpatient Note  [address] [facility] Department    Today's Date: 9/23/2021  Unit: [facility] 8 INTERMEDIATE CARE UNIT    Received request from Health Care provider. Upon review of chart and communication with care team, patient's decision making abilities are not in question. Patient was/were present in the room during visit. (Pt is in [place] isolation. Did not attempt to visit pt and no outside visitors are usually allowed.)   Goals of ACP Conversation:  Provide ACP Paperwork ([address]) for pt to review and complete, if desired. Health Care Decision Makers:      Primary Decision Maker: Lucas Donnelly - Mother - [phone]    Secondary Decision Maker: Radhadavid Dani - Daughter - [phone]      Summary:  No Decision Maker named by patient at this time (Decision makers listed are automatically pulled from pt's chart.)      Advance Care Planning Documents (Patient Wishes) on file:  None     Assessment:    After receiving consult,  reviewed pt's chart. Pt has two family members listed on chart:  His mother and daughter. Interventions:  [place] HCPOA form provided    Care Preferences Communicated:  No    Outcomes/Plan:  Follow-up, as needed to assist with questions or with completing form.     [facility] on 9/23/2021 at 2:16 PM

## 2021-09-24 LAB
ANION GAP SERPL CALC-SCNC: 10 MMOL/L (ref 7–16)
BUN SERPL-MCNC: 27 MG/DL (ref 6–23)
CALCIUM SERPL-MCNC: 8.5 MG/DL (ref 8.3–10.4)
CHLORIDE SERPL-SCNC: 108 MMOL/L (ref 98–107)
CO2 SERPL-SCNC: 26 MMOL/L (ref 21–32)
CREAT SERPL-MCNC: 0.7 MG/DL (ref 0.8–1.5)
CRP SERPL-MCNC: 2.4 MG/DL (ref 0–0.9)
ERYTHROCYTE [DISTWIDTH] IN BLOOD BY AUTOMATED COUNT: 13.4 % (ref 11.9–14.6)
GLUCOSE SERPL-MCNC: 118 MG/DL (ref 65–100)
HCT VFR BLD AUTO: 49.2 % (ref 41.1–50.3)
HGB BLD-MCNC: 16.1 G/DL (ref 13.6–17.2)
MCH RBC QN AUTO: 27.4 PG (ref 26.1–32.9)
MCHC RBC AUTO-ENTMCNC: 32.7 G/DL (ref 31.4–35)
MCV RBC AUTO: 83.8 FL (ref 79.6–97.8)
NRBC # BLD: 0 K/UL (ref 0–0.2)
PLATELET # BLD AUTO: 406 K/UL (ref 150–450)
PMV BLD AUTO: 9.5 FL (ref 9.4–12.3)
POTASSIUM SERPL-SCNC: 4.4 MMOL/L (ref 3.5–5.1)
RBC # BLD AUTO: 5.87 M/UL (ref 4.23–5.6)
SODIUM SERPL-SCNC: 144 MMOL/L (ref 136–145)
WBC # BLD AUTO: 10.1 K/UL (ref 4.3–11.1)

## 2021-09-24 PROCEDURE — 74011250637 HC RX REV CODE- 250/637: Performed by: NURSE PRACTITIONER

## 2021-09-24 PROCEDURE — 36415 COLL VENOUS BLD VENIPUNCTURE: CPT

## 2021-09-24 PROCEDURE — 74011250636 HC RX REV CODE- 250/636: Performed by: STUDENT IN AN ORGANIZED HEALTH CARE EDUCATION/TRAINING PROGRAM

## 2021-09-24 PROCEDURE — 65270000029 HC RM PRIVATE

## 2021-09-24 PROCEDURE — 74011000250 HC RX REV CODE- 250: Performed by: NURSE PRACTITIONER

## 2021-09-24 PROCEDURE — 85027 COMPLETE CBC AUTOMATED: CPT

## 2021-09-24 PROCEDURE — 94760 N-INVAS EAR/PLS OXIMETRY 1: CPT

## 2021-09-24 PROCEDURE — 80048 BASIC METABOLIC PNL TOTAL CA: CPT

## 2021-09-24 PROCEDURE — 74011250636 HC RX REV CODE- 250/636: Performed by: HOSPITALIST

## 2021-09-24 PROCEDURE — 86140 C-REACTIVE PROTEIN: CPT

## 2021-09-24 PROCEDURE — 77010033711 HC HIGH FLOW OXYGEN

## 2021-09-24 RX ORDER — SODIUM CHLORIDE 450 MG/100ML
50 INJECTION, SOLUTION INTRAVENOUS CONTINUOUS
Status: DISCONTINUED | OUTPATIENT
Start: 2021-09-24 | End: 2021-09-25

## 2021-09-24 RX ORDER — CLONAZEPAM 0.5 MG/1
0.5 TABLET ORAL
Status: DISCONTINUED | OUTPATIENT
Start: 2021-09-24 | End: 2021-10-05 | Stop reason: HOSPADM

## 2021-09-24 RX ADMIN — Medication 10 ML: at 17:05

## 2021-09-24 RX ADMIN — SODIUM CHLORIDE 50 ML/HR: 450 INJECTION, SOLUTION INTRAVENOUS at 11:31

## 2021-09-24 RX ADMIN — DEXAMETHASONE SODIUM PHOSPHATE 6 MG: 10 INJECTION INTRAMUSCULAR; INTRAVENOUS at 08:29

## 2021-09-24 RX ADMIN — Medication 10 ML: at 21:31

## 2021-09-24 RX ADMIN — ENOXAPARIN SODIUM 40 MG: 40 INJECTION SUBCUTANEOUS at 08:29

## 2021-09-24 RX ADMIN — CLONAZEPAM 0.5 MG: 0.5 TABLET ORAL at 10:46

## 2021-09-24 RX ADMIN — BARICITINIB 4 MG: 2 TABLET, FILM COATED ORAL at 10:46

## 2021-09-24 RX ADMIN — Medication 10 ML: at 06:09

## 2021-09-24 NOTE — PROGRESS NOTES
Patient sleeping in bed on 10L HF NC. Patient does not appear to be in pain and is in no acute distress at this time. Respirations even and unlabored. Call light within reach, will continue to monitor. Preparing to give report to oncoming RN.

## 2021-09-24 NOTE — PROGRESS NOTES
Beside shift report received from [facility]  Patient lying in bed  Respirations even and unlabored on 10L HFNC  No signs of distress  No needs expressed at this time  Safety measures in place

## 2021-09-24 NOTE — PROGRESS NOTES
Patient seen and assessed at this time. Patient lying in bed watching tv. Patient alert and oriented 4x. Patient denies pain at this time. No needs expressed at this time. Respirations even and unlabored on 10L Hi flow NC. Will monitor.

## 2021-09-24 NOTE — PROGRESS NOTES
Patient sleeping in bed on 12L HF NC. A&Ox4. Respirations even and unlabored. Patient is calm and does not appear to be anxious. Patient denies pain and is in no acute distress at this time. Call light within reach, will continue to monitor.

## 2021-09-24 NOTE — PROGRESS NOTES
MSN, CM:  Patient requiring 10L NC today. Patient has no discharge needs at this time. Case Management will continue to follow.

## 2021-09-24 NOTE — PROGRESS NOTES
Hospitalist Progress Note   Admit Date:  2021  6:44 PM   Name:  Lilly Snider. Age:  40 y.o. Sex:  male  :  1975   MRN:  [id number]   Room:  [facility]/    Presenting Complaint: No chief complaint on file. Reason(s) for Admission: Pneumonia due to COVID-19 virus [U07.1, J12.82]     Hospital Course & Interval History:   41 yo CM with no past medical history presented with a 5 day history of worsening cough and shortness of breath. He went to [facility] prior to coming to [facility] was swabbed for COVID.  No known [facility] contacts. [facility] has had some mild nausea but denies any other GI issues including diarrhea. No joint or muscle aches. Denies CP/fevers/chills, loss of taste/smell. Has not been vaccinated. Has not noticed any swelling or redness in his legs. Subjective (21):  No AEO. No new complaints. Labs grossly unremarkable, but will start gentle IVF based on trends and decreased intake. Continues to require 10 L for a 91% SpO2. Assessment & Plan:     # Acute hypoxic respiratory failure due to COVID  - Decadron 6 mg qd   - Remdesivir, received 4 days  - monitor renal/hepatic function  - CT chest negative for PE  - wean supplemental oxygen as tolerated   - remains on 4L NC. CRP is 19. Will repeat IN AM. May qualify for baricinitib vs actemra.   Sep/22: CRP now 8.0 from 19.9  Sep/23: O2 requirements have increased; continue close monitoring  Sep/24: baricitinib started yesterday   - CRP 2.4   - Dexamethasone Day 5     # Obesity Class 1  - BMI 33.97  - Adds to complexity of care      Dispo/Discharge Planning: > 2 midnights    Diet:  ADULT DIET Regular; 3 carb choices (45 gm/meal)  ADULT ORAL NUTRITION SUPPLEMENT Breakfast, Lunch, Dinner; Standard High Calorie/High Protein  DVT PPx: enoxaparin  Code status: Full Code    Hospital Problems as of 2021 Never Reviewed        Codes Class Noted - Resolved POA    * (Principal) Pneumonia due to COVID-19 virus ICD-10-CM: U07.1, J12.82  ICD-9-CM: 480.8, 079.89  9/21/2021 - Present Unknown              Objective:     Patient Vitals for the past 24 hrs:   Temp Pulse Resp BP SpO2   09/24/21 0800 97.8 °F (36.6 °C) (!) 104 20 96/61 (!) 88 %   09/24/21 0408 -- -- -- -- 93 %   09/24/21 0407 97.8 °F (36.6 °C) 89 20 (!) 103/51 96 %   09/23/21 2301 98 °F (36.7 °C) 90 20 102/71 94 %   09/23/21 1930 98.1 °F (36.7 °C) (!) 102 20 117/83 91 %   09/23/21 1513 97.9 °F (36.6 °C) (!) 109 20 115/72 90 %   09/23/21 1124 98.3 °F (36.8 °C) 99 20 119/68 91 %     Oxygen Therapy  O2 Sat (%): (!) 88 % (09/24/21 0800)  O2 Device: Hi flow nasal cannula (09/24/21 0757)  Skin Assessment: Clean, dry, & intact (09/24/21 0408)  O2 Flow Rate (L/min): 10 l/min (09/24/21 0757)    Estimated body mass index is 33.97 kg/m² as calculated from the following:    Height as of this encounter: 5' 9\" (1.753 m). Weight as of 9/20/21: 104.3 kg (230 lb). Intake/Output Summary (Last 24 hours) at 9/24/2021 1023  Last data filed at 9/23/2021 1827  Gross per 24 hour   Intake --   Output 800 ml   Net -800 ml         Physical Exam:   General:    No overt distress  Head:  Normocephalic, atraumatic  Eyes:  Sclerae appear normal.  Pupils equally round. ENT:  Nares appear normal, no drainage. Moist oral mucosa  Neck:  No restricted ROM. Trachea midline   CV:   RRR. No m/r/g. Lungs:   Respirations even, unlabored  Abdomen:  Obese, nontender, nondistended. Extremities: No cyanosis or clubbing. Skin:     No rashes and normal coloration. Warm and dry. Neuro:  Cranial nerves II-XII grossly intact. Psych:  Normal mood and affect.   Alert and oriented x3    I have reviewed ordered lab tests and independently visualized imaging below:    Last 24hr Labs:  Recent Results (from the past 24 hour(s))   PROCALCITONIN    Collection Time: 09/23/21  6:57 PM   Result Value Ref Range    Procalcitonin 0.05 ng/mL   C REACTIVE PROTEIN, QT    Collection Time: 09/24/21  7:50 AM   Result Value Ref Range    C-Reactive protein 2.4 (H) 0.0 - 0.9 mg/dL   METABOLIC PANEL, BASIC    Collection Time: 09/24/21  7:50 AM   Result Value Ref Range    Sodium 144 136 - 145 mmol/L    Potassium 4.4 3.5 - 5.1 mmol/L    Chloride 108 (H) 98 - 107 mmol/L    CO2 26 21 - 32 mmol/L    Anion gap 10 7 - 16 mmol/L    Glucose 118 (H) 65 - 100 mg/dL    BUN 27 (H) 6 - 23 MG/DL    Creatinine 0.70 (L) 0.8 - 1.5 MG/DL    GFR est AA >60 >60 ml/min/1.73m2    GFR est non-AA >60 >60 ml/min/1.73m2    Calcium 8.5 8.3 - 10.4 MG/DL   CBC W/O DIFF    Collection Time: 09/24/21  7:50 AM   Result Value Ref Range    WBC 10.1 4.3 - 11.1 K/uL    RBC 5.87 (H) 4.23 - 5.6 M/uL    HGB 16.1 13.6 - 17.2 g/dL    HCT 49.2 41.1 - 50.3 %    MCV 83.8 79.6 - 97.8 FL    MCH 27.4 26.1 - 32.9 PG    MCHC 32.7 31.4 - 35.0 g/dL    RDW 13.4 11.9 - 14.6 %    PLATELET 527 540 - 526 K/uL    MPV 9.5 9.4 - 12.3 FL    ABSOLUTE NRBC 0.00 0.0 - 0.2 K/uL       All Micro Results     None          Other Studies:  No results found. Current Meds:  Current Facility-Administered Medications   Medication Dose Route Frequency    baricitinib (OLUMIANT) tablet 4 mg  4 mg Oral DAILY    enoxaparin (LOVENOX) injection 40 mg  40 mg SubCUTAneous DAILY    sodium chloride (NS) flush 5-40 mL  5-40 mL IntraVENous Q8H    sodium chloride (NS) flush 5-40 mL  5-40 mL IntraVENous PRN    acetaminophen (TYLENOL) tablet 650 mg  650 mg Oral Q6H PRN    Or    acetaminophen (TYLENOL) suppository 650 mg  650 mg Rectal Q6H PRN    polyethylene glycol (MIRALAX) packet 17 g  17 g Oral DAILY PRN    ondansetron (ZOFRAN ODT) tablet 4 mg  4 mg Oral Q8H PRN    Or    ondansetron (ZOFRAN) injection 4 mg  4 mg IntraVENous Q6H PRN    dexamethasone (DECADRON) 10 mg/mL injection 6 mg  6 mg IntraVENous Q24H       Signed:  Kehinde Dougherty NP    Part of this note may have been written by using a voice dictation software. The note has been proof read but may still contain some grammatical/other typographical errors.

## 2021-09-25 ENCOUNTER — APPOINTMENT (OUTPATIENT)
Dept: GENERAL RADIOLOGY | Age: 46
DRG: 177 | End: 2021-09-25
Attending: FAMILY MEDICINE
Payer: COMMERCIAL

## 2021-09-25 PROCEDURE — 94760 N-INVAS EAR/PLS OXIMETRY 1: CPT

## 2021-09-25 PROCEDURE — 71045 X-RAY EXAM CHEST 1 VIEW: CPT

## 2021-09-25 PROCEDURE — 74011250636 HC RX REV CODE- 250/636: Performed by: STUDENT IN AN ORGANIZED HEALTH CARE EDUCATION/TRAINING PROGRAM

## 2021-09-25 PROCEDURE — 74011250637 HC RX REV CODE- 250/637: Performed by: NURSE PRACTITIONER

## 2021-09-25 PROCEDURE — 94640 AIRWAY INHALATION TREATMENT: CPT

## 2021-09-25 PROCEDURE — 94664 DEMO&/EVAL PT USE INHALER: CPT

## 2021-09-25 PROCEDURE — 74011250636 HC RX REV CODE- 250/636: Performed by: HOSPITALIST

## 2021-09-25 PROCEDURE — 74011250637 HC RX REV CODE- 250/637: Performed by: FAMILY MEDICINE

## 2021-09-25 PROCEDURE — 77010033711 HC HIGH FLOW OXYGEN

## 2021-09-25 PROCEDURE — 65270000029 HC RM PRIVATE

## 2021-09-25 PROCEDURE — 74011000250 HC RX REV CODE- 250: Performed by: NURSE PRACTITIONER

## 2021-09-25 RX ORDER — ALBUTEROL SULFATE 90 UG/1
2 AEROSOL, METERED RESPIRATORY (INHALATION)
Status: DISCONTINUED | OUTPATIENT
Start: 2021-09-25 | End: 2021-10-05 | Stop reason: HOSPADM

## 2021-09-25 RX ORDER — BUDESONIDE AND FORMOTEROL FUMARATE DIHYDRATE 80; 4.5 UG/1; UG/1
2 AEROSOL RESPIRATORY (INHALATION)
Status: DISCONTINUED | OUTPATIENT
Start: 2021-09-25 | End: 2021-10-05 | Stop reason: HOSPADM

## 2021-09-25 RX ADMIN — SODIUM CHLORIDE 50 ML/HR: 450 INJECTION, SOLUTION INTRAVENOUS at 05:36

## 2021-09-25 RX ADMIN — Medication 10 ML: at 18:14

## 2021-09-25 RX ADMIN — DEXAMETHASONE SODIUM PHOSPHATE 6 MG: 10 INJECTION INTRAMUSCULAR; INTRAVENOUS at 09:01

## 2021-09-25 RX ADMIN — BUDESONIDE AND FORMOTEROL FUMARATE DIHYDRATE 2 PUFF: 80; 4.5 AEROSOL RESPIRATORY (INHALATION) at 20:00

## 2021-09-25 RX ADMIN — Medication 10 ML: at 05:34

## 2021-09-25 RX ADMIN — ENOXAPARIN SODIUM 40 MG: 40 INJECTION SUBCUTANEOUS at 09:05

## 2021-09-25 RX ADMIN — CLONAZEPAM 0.5 MG: 0.5 TABLET ORAL at 00:48

## 2021-09-25 RX ADMIN — CLONAZEPAM 0.5 MG: 0.5 TABLET ORAL at 14:33

## 2021-09-25 RX ADMIN — BARICITINIB 4 MG: 2 TABLET, FILM COATED ORAL at 08:59

## 2021-09-25 NOTE — PROGRESS NOTES
Bedside report received from night nurse Clint. Assessment done as noted  Respiration even and unlabored 20/min; denies pain or nausea at present. Afebrile this morning. Non-productive coughing at interval. O2 intact with 15 liter HFNC with O2 sats 92% at rest. D-sats in low 80s with exertion. Remains on Droplet plus isolation for positive COVID-19 screening. Ordered PPE in place and in use. Encouraged to call with needs.

## 2021-09-25 NOTE — PROGRESS NOTES
Findings: The cardiac silhouette is not enlarged. There is no pneumothorax. Evolving  peripheral infiltrate is seen most evident in the right mid and lower lung  field, and left lung base. No significant pleural effusion is seen.     IMPRESSION  1. Evolving peripheral infiltrate, right greater than left, consistent with the  history of Covid pneumonia. X-ray results noted as above. Perfect serve message to Dr. Key Bingham to notify. No new orders at present. Resting quietly at present. NAD noted. Attempted to instruct how to use I/S. Pt sleepy from Klonopin given earlier for anxiety. Will attempt when pt is more awake to follow instruction and demonstrated. Remains on O2 12 L/HFNC with o2 sats 92% at rest.Remains on droplet plus isolation for positive COVID-19 screening. Ordered PPE in place and in use. Safety maintained through out the shift. To report off to oncoming nurse.

## 2021-09-25 NOTE — PROGRESS NOTES
Hospitalist Progress Note   Admit Date:  2021  6:44 PM   Name:  Isac Segura. Age:  40 y.o. Sex:  male  :  1975   MRN:  [id number]   Room:  [facility]/    Presenting Complaint: No chief complaint on file. Reason(s) for Admission: Pneumonia due to COVID-19 virus [U07.1, J12.82]     Hospital Course & Interval History:   39 yo CM with no past medical history presented with a 5 day history of worsening cough and shortness of breath. He went to [facility] prior to coming to [facility] was swabbed for COVID. In ED he desatted to high 80's on RA. CXR shows covid PNA. Subjective (21):  Pt maintains on [id number]. Stated he is doing fine. Sneezes more but has some productive cough not bothersome to hime. Denies fever, chills, worsening SOB, chest pain. Assessment & Plan:     Acute respiratory failure with hypoxia 2/2 Covid-19 Pneumonia  Symptom onset 5 days, tested positive , Unvaccinated. In ED he desatted to high 80's on RA. CXR shows covid PNA. CT chest negative for PE. Procal negative. Dexamethasone for 10 days EOT 10/1/21  Received Remdesivir 2 doses prior to switching to Baricitinib on   Cont Baricitinib EOT   Add IS and albuterol prn  Add Symbicort  D/c IVF  Encourage proning  CRP trending down  Repeat CXR in AM  On [id number].  Wean O2 as tolerated          Dispo/Discharge Planning:      >2 days pending improvement    Diet:  ADULT DIET Regular; 3 carb choices (45 gm/meal)  ADULT ORAL NUTRITION SUPPLEMENT Breakfast, Lunch, Dinner; Standard High Calorie/High Protein  DVT PPx: Lovenox SQ  Code status: Full Code    Hospital Problems as of 2021 Never Reviewed        Codes Class Noted - Resolved POA    * (Principal) Pneumonia due to COVID-19 virus ICD-10-CM: U07.1, J12.82  ICD-9-CM: 480.8, 079.89  2021 - Present Unknown              Objective:     Patient Vitals for the past 24 hrs:   Temp Pulse Resp BP SpO2   21 1549 98 °F (36.7 °C) (!) 116 18 106/70 90 %   21 110 98.4 °F (36.9 °C) 93 18 104/68 93 %   09/25/21 0817 98 °F (36.7 °C) (!) 101 18 (!) 94/59 90 %   09/25/21 0540 -- -- 18 -- --   09/25/21 0407 97.7 °F (36.5 °C) (!) 110 18 99/73 92 %   09/25/21 0211 -- -- -- -- 92 %   09/25/21 0006 98.7 °F (37.1 °C) 92 22 (!) 123/91 90 %   09/24/21 2010 98.6 °F (37 °C) (!) 101 18 119/77 91 %   09/24/21 1634 -- -- -- -- 93 %     Oxygen Therapy  O2 Sat (%): 90 % (09/25/21 1549)  O2 Device: Hi flow nasal cannula (09/25/21 0817)  Skin Assessment: Clean, dry, & intact (09/25/21 0817)  O2 Flow Rate (L/min): 12 l/min (09/25/21 0817)    Estimated body mass index is 33.97 kg/m² as calculated from the following:    Height as of this encounter: 5' 9\" (1.753 m). Weight as of 9/20/21: 104.3 kg (230 lb). Intake/Output Summary (Last 24 hours) at 9/25/2021 1622  Last data filed at 9/25/2021 1331  Gross per 24 hour   Intake 240 ml   Output --   Net 240 ml         Physical Exam:     General:    Well nourished. No overt distress  Head:  Normocephalic, atraumatic  Eyes:  Sclerae appear normal.  Pupils equally round. ENT:  Nares appear normal, no drainage. Moist oral mucosa  Neck:  No restricted ROM. Trachea midline   CV:   RRR. No m/r/g. No jugular venous distension. Lungs:   Rhonchi at lung bases. Respirations even, unlabored  Abdomen: Bowel sounds present. Soft, nontender, nondistended. Extremities: No cyanosis or clubbing. No edema  Skin:     No rashes and normal coloration. Warm and dry. Neuro:  Cranial nerves II-XII grossly intact. Sensation intact  Psych:  Normal mood and affect. Alert and oriented x3    I have reviewed ordered lab tests and independently visualized imaging below:    Last 24hr Labs:  No results found for this or any previous visit (from the past 24 hour(s)). All Micro Results     None          Other Studies:  No results found.     Current Meds:  Current Facility-Administered Medications   Medication Dose Route Frequency    clonazePAM (KlonoPIN) tablet 0.5 mg  0.5 mg Oral BID PRN    0.45% sodium chloride infusion  50 mL/hr IntraVENous CONTINUOUS    baricitinib (OLUMIANT) tablet 4 mg  4 mg Oral DAILY    enoxaparin (LOVENOX) injection 40 mg  40 mg SubCUTAneous DAILY    sodium chloride (NS) flush 5-40 mL  5-40 mL IntraVENous Q8H    sodium chloride (NS) flush 5-40 mL  5-40 mL IntraVENous PRN    acetaminophen (TYLENOL) tablet 650 mg  650 mg Oral Q6H PRN    Or    acetaminophen (TYLENOL) suppository 650 mg  650 mg Rectal Q6H PRN    polyethylene glycol (MIRALAX) packet 17 g  17 g Oral DAILY PRN    ondansetron (ZOFRAN ODT) tablet 4 mg  4 mg Oral Q8H PRN    Or    ondansetron (ZOFRAN) injection 4 mg  4 mg IntraVENous Q6H PRN    dexamethasone (DECADRON) 10 mg/mL injection 6 mg  6 mg IntraVENous Q24H       Signed: Sahil Philip DO    Part of this note may have been written by using a voice dictation software. The note has been proof read but may still contain some grammatical/other typographical errors.

## 2021-09-25 NOTE — PROGRESS NOTES
Patient in bed resting quietly. No s/s of distress. No episodes overnight. Respirations even and unlabored on 12 L Hi flow. Safety measures in place. Report to be given to oncoming nurse.

## 2021-09-26 LAB
BNP SERPL-MCNC: 16 PG/ML (ref 5–125)
D DIMER PPP FEU-MCNC: 0.82 UG/ML(FEU)

## 2021-09-26 PROCEDURE — 65270000029 HC RM PRIVATE

## 2021-09-26 PROCEDURE — 36415 COLL VENOUS BLD VENIPUNCTURE: CPT

## 2021-09-26 PROCEDURE — 77010033711 HC HIGH FLOW OXYGEN

## 2021-09-26 PROCEDURE — 74011250637 HC RX REV CODE- 250/637: Performed by: FAMILY MEDICINE

## 2021-09-26 PROCEDURE — 74011250637 HC RX REV CODE- 250/637: Performed by: NURSE PRACTITIONER

## 2021-09-26 PROCEDURE — 74011250636 HC RX REV CODE- 250/636: Performed by: STUDENT IN AN ORGANIZED HEALTH CARE EDUCATION/TRAINING PROGRAM

## 2021-09-26 PROCEDURE — 74011250637 HC RX REV CODE- 250/637: Performed by: HOSPITALIST

## 2021-09-26 PROCEDURE — 83880 ASSAY OF NATRIURETIC PEPTIDE: CPT

## 2021-09-26 PROCEDURE — 94760 N-INVAS EAR/PLS OXIMETRY 1: CPT

## 2021-09-26 PROCEDURE — 85379 FIBRIN DEGRADATION QUANT: CPT

## 2021-09-26 PROCEDURE — 94640 AIRWAY INHALATION TREATMENT: CPT

## 2021-09-26 PROCEDURE — 74011250636 HC RX REV CODE- 250/636: Performed by: HOSPITALIST

## 2021-09-26 RX ORDER — GUAIFENESIN 100 MG/5ML
100 SOLUTION ORAL
Status: DISCONTINUED | OUTPATIENT
Start: 2021-09-26 | End: 2021-10-05 | Stop reason: HOSPADM

## 2021-09-26 RX ADMIN — ENOXAPARIN SODIUM 40 MG: 40 INJECTION SUBCUTANEOUS at 09:40

## 2021-09-26 RX ADMIN — BUDESONIDE AND FORMOTEROL FUMARATE DIHYDRATE 2 PUFF: 80; 4.5 AEROSOL RESPIRATORY (INHALATION) at 08:00

## 2021-09-26 RX ADMIN — DEXAMETHASONE SODIUM PHOSPHATE 6 MG: 10 INJECTION INTRAMUSCULAR; INTRAVENOUS at 09:40

## 2021-09-26 RX ADMIN — CLONAZEPAM 0.5 MG: 0.5 TABLET ORAL at 17:36

## 2021-09-26 RX ADMIN — ACETAMINOPHEN 650 MG: 325 TABLET ORAL at 22:00

## 2021-09-26 RX ADMIN — Medication 10 ML: at 22:17

## 2021-09-26 RX ADMIN — Medication 10 ML: at 06:07

## 2021-09-26 RX ADMIN — Medication 10 ML: at 03:17

## 2021-09-26 RX ADMIN — Medication 10 ML: at 16:39

## 2021-09-26 RX ADMIN — BUDESONIDE AND FORMOTEROL FUMARATE DIHYDRATE 2 PUFF: 80; 4.5 AEROSOL RESPIRATORY (INHALATION) at 19:19

## 2021-09-26 RX ADMIN — BARICITINIB 4 MG: 2 TABLET, FILM COATED ORAL at 09:40

## 2021-09-26 RX ADMIN — GUAIFENESIN 100 MG: 200 SOLUTION ORAL at 03:15

## 2021-09-26 NOTE — PROGRESS NOTES
Resting quietly at present. NAD noted. Remains on O2 with 15L with sats 93% at rest. Using I/S with good effort. Remains on droplet plus isolation for positive COVID-19 screening. Ordered PPE in place and in use. Safety maintained through out the shift. To report off to oncoming nurse.

## 2021-09-26 NOTE — PROGRESS NOTES
Hospitalist Progress Note   Admit Date:  2021  6:44 PM   Name:  Jackie Wylie. Age:  40 y.o. Sex:  male  :  1975   MRN:  [id number]   Room:  [facility]/    Presenting Complaint: No chief complaint on file. Reason(s) for Admission: Pneumonia due to COVID-19 virus [U07.1, J12.82]     Hospital Course & Interval History:   41 yo CM with no past medical history presented with a 5 day history of worsening cough and shortness of breath. He went to [facility] prior to coming to [facility] was swabbed for COVID. In ED he desatted to high 80's on RA. CXR shows covid PNA. Subjective (21):  Pt reported taking off his oxygen to go to the bathroom as his NC tube was not long enough and desatted. Encourage to use bedside commode. On [id number] vs 12L from yesterday. Denies fever, chills, worsening SOB, chest pain. Assessment & Plan:     Acute respiratory failure with hypoxia 2/2 Covid-19 Pneumonia, worsening  Symptom onset 5 days, tested positive , Unvaccinated. In ED he desatted to high 80's on RA. CXR shows covid PNA. CT chest negative for PE. Procal negative. Dexamethasone for 10 days EOT 10/1/21  Received Remdesivir 2 doses prior to switching to Baricitinib on   Cont Baricitinib EOT 10/6  Cont IS and albuterol prn  Cont Symbicort  Encourage proning  CRP trending down  Repeat CXR shows worsening b/l infiltrates  On [id number]. Wean O2 as tolerated  Obtain repeat D-Dimer, pBNP, sputum Cx          Dispo/Discharge Planning:      >2 days pending improvement    MDM:    Due to patient's current comorbid conditions as well as presenting problems, patient is at high risk for complications/further decompensation during the hospitalization.       Diet:  ADULT DIET Regular; 3 carb choices (45 gm/meal)  ADULT ORAL NUTRITION SUPPLEMENT Breakfast, Lunch, Dinner; Standard High Calorie/High Protein  DVT PPx: Lovenox SQ  Code status: Full Code    Hospital Problems as of 2021 Never Reviewed        Codes Class Noted - Resolved POA    * (Principal) Pneumonia due to COVID-19 virus ICD-10-CM: U07.1, J12.82  ICD-9-CM: 480.8, 079.89  9/21/2021 - Present Unknown        Acute respiratory failure with hypoxia [facility]) ICD-10-CM: J96.01  ICD-9-CM: 518.81  9/20/2021 - Present Yes              Objective:     Patient Vitals for the past 24 hrs:   Temp Pulse Resp BP SpO2   09/26/21 0938 98.4 °F (36.9 °C) (!) 107 18 91/60 94 %   09/26/21 0832 -- -- -- -- 90 %   09/26/21 0336 98.2 °F (36.8 °C) (!) 105 18 109/73 90 %   09/25/21 2333 97.7 °F (36.5 °C) (!) 101 18 100/63 91 %   09/25/21 2000 -- -- -- -- 90 %   09/25/21 1938 98.3 °F (36.8 °C) (!) 106 18 104/65 90 %   09/25/21 1549 98 °F (36.7 °C) (!) 116 18 106/70 90 %   09/25/21 1103 98.4 °F (36.9 °C) 93 18 104/68 93 %     Oxygen Therapy  O2 Sat (%): 94 % (09/26/21 0938)  Pulse via Oximetry: 88 beats per minute (09/26/21 0832)  O2 Device: Hi flow nasal cannula (09/26/21 0832)  Skin Assessment: Clean, dry, & intact (09/25/21 0817)  O2 Flow Rate (L/min): 15 l/min (09/26/21 0832)    Estimated body mass index is 30.72 kg/m² as calculated from the following:    Height as of this encounter: 5' 9\" (1.753 m). Weight as of this encounter: 94.3 kg (208 lb). Intake/Output Summary (Last 24 hours) at 9/26/2021 1102  Last data filed at 9/26/2021 0336  Gross per 24 hour   Intake 610 ml   Output 425 ml   Net 185 ml         Physical Exam:     General:    Well nourished. No overt distress  Head:  Normocephalic, atraumatic  Eyes:  Sclerae appear normal.  Pupils equally round. ENT:  Nares appear normal, no drainage. Moist oral mucosa  Neck:  No restricted ROM. Trachea midline   CV:   RRR. No m/r/g. No jugular venous distension. Lungs:   Rhonchi at lung bases. Respirations even, unlabored  Abdomen: Bowel sounds present. Soft, nontender, nondistended. Extremities: No cyanosis or clubbing. No edema  Skin:     No rashes and normal coloration. Warm and dry.     Neuro:  Cranial nerves II-XII grossly intact. Sensation intact  Psych:  Normal mood and affect. Alert and oriented x3    I have reviewed ordered lab tests and independently visualized imaging below:    Last 24hr Labs:  No results found for this or any previous visit (from the past 24 hour(s)). All Micro Results     Procedure Component Value Units Date/Time    CULTURE, RESPIRATORY/SPUTUM/BRONCH Zygmunt Liner [[id number]]     Order Status: Sent Specimen: Sputum           Other Studies:  XR CHEST SNGL V    Result Date: 9/25/2021  CHEST RADIOGRAPH, 1 views, 9/25/2021 History: Hypoxia with Covid infection. Technique: Portable frontal view of the chest. Comparison: Chest radiograph 9/20/2021 Findings: The cardiac silhouette is not enlarged. There is no pneumothorax. Evolving peripheral infiltrate is seen most evident in the right mid and lower lung field, and left lung base. No significant pleural effusion is seen. 1.  Evolving peripheral infiltrate, right greater than left, consistent with the history of Covid pneumonia. This report was made using voice transcription. Despite my best efforts to avoid any, transcription errors may persist. If there is any question about the accuracy of the report or need for clarification, then please call [phone], or text me through [employer] for clarification or correction.        Current Meds:  Current Facility-Administered Medications   Medication Dose Route Frequency    guaiFENesin (ROBITUSSIN) 100 mg/5 mL oral liquid 100 mg  100 mg Oral Q4H PRN    budesonide-formoterol (SYMBICORT) 80-4.5 mcg inhaler  2 Puff Inhalation BID RT    albuterol (PROVENTIL HFA, VENTOLIN HFA, PROAIR HFA) inhaler 2 Puff  2 Puff Inhalation Q6H PRN    clonazePAM (KlonoPIN) tablet 0.5 mg  0.5 mg Oral BID PRN    baricitinib (OLUMIANT) tablet 4 mg  4 mg Oral DAILY    enoxaparin (LOVENOX) injection 40 mg  40 mg SubCUTAneous DAILY    sodium chloride (NS) flush 5-40 mL  5-40 mL IntraVENous Q8H    sodium chloride (NS) flush 5-40 mL  5-40 mL IntraVENous PRN    acetaminophen (TYLENOL) tablet 650 mg  650 mg Oral Q6H PRN    Or    acetaminophen (TYLENOL) suppository 650 mg  650 mg Rectal Q6H PRN    polyethylene glycol (MIRALAX) packet 17 g  17 g Oral DAILY PRN    ondansetron (ZOFRAN ODT) tablet 4 mg  4 mg Oral Q8H PRN    Or    ondansetron (ZOFRAN) injection 4 mg  4 mg IntraVENous Q6H PRN    dexamethasone (DECADRON) 10 mg/mL injection 6 mg  6 mg IntraVENous Q24H       Signed: Lilibeth Nguyen DO    Part of this note may have been written by using a voice dictation software. The note has been proof read but may still contain some grammatical/other typographical errors.

## 2021-09-26 NOTE — PROGRESS NOTES
D-dimer 0.82 noted. Spoke with Dr. Erendira Aly and made aware. No new orders received. Will continue to monitor.

## 2021-09-26 NOTE — PROGRESS NOTES
Bedside report received from night nurse Clint. Assessment done as noted  Respiration even and unlabored 20/min; denies pain or nausea at present. Afebrile this morning. Non-productive coughing at interval. O2 intact with 15 liter via HFNC with O2 sats 94% at rest. D-sats easily with ambulation and exertion. Remains on Droplet plus isolation for positive COVID-19 screening. .Ordered PPE in place and in use. Encouraged to call with needs.

## 2021-09-26 NOTE — PROGRESS NOTES
Patient seen and assessed at this time. Patient drowsy but awakens to voice. Patient denies pain. Patient denies needs. Breath sounds diminished bilaterally. Safety measures implemented. Will monitor.

## 2021-09-26 NOTE — PROGRESS NOTES
Patient seen and assessed. Respirations even and unlabored. Patient denies pain at this time. Patient denies needs at this time. Patient alert and oriented at this time. Bed in low position. Call light within reach. Will monitor.

## 2021-09-27 PROBLEM — U07.1 SEPSIS DUE TO COVID-19 (HCC): Status: ACTIVE | Noted: 2021-09-27

## 2021-09-27 PROBLEM — A41.89 SEPSIS DUE TO COVID-19 (HCC): Status: ACTIVE | Noted: 2021-09-27

## 2021-09-27 LAB
ANION GAP SERPL CALC-SCNC: 7 MMOL/L (ref 7–16)
BUN SERPL-MCNC: 21 MG/DL (ref 6–23)
CALCIUM SERPL-MCNC: 9.1 MG/DL (ref 8.3–10.4)
CHLORIDE SERPL-SCNC: 105 MMOL/L (ref 98–107)
CO2 SERPL-SCNC: 29 MMOL/L (ref 21–32)
CREAT SERPL-MCNC: 0.75 MG/DL (ref 0.8–1.5)
ERYTHROCYTE [DISTWIDTH] IN BLOOD BY AUTOMATED COUNT: 13.1 % (ref 11.9–14.6)
GLUCOSE SERPL-MCNC: 85 MG/DL (ref 65–100)
HCT VFR BLD AUTO: 51.8 % (ref 41.1–50.3)
HGB BLD-MCNC: 16.9 G/DL (ref 13.6–17.2)
LACTATE SERPL-SCNC: 2.2 MMOL/L (ref 0.4–2)
MCH RBC QN AUTO: 27.6 PG (ref 26.1–32.9)
MCHC RBC AUTO-ENTMCNC: 32.6 G/DL (ref 31.4–35)
MCV RBC AUTO: 84.5 FL (ref 79.6–97.8)
NRBC # BLD: 0 K/UL (ref 0–0.2)
PLATELET # BLD AUTO: 540 K/UL (ref 150–450)
PMV BLD AUTO: 9.3 FL (ref 9.4–12.3)
POTASSIUM SERPL-SCNC: 4.5 MMOL/L (ref 3.5–5.1)
PROCALCITONIN SERPL-MCNC: <0.05 NG/ML
RBC # BLD AUTO: 6.13 M/UL (ref 4.23–5.6)
SODIUM SERPL-SCNC: 141 MMOL/L (ref 136–145)
WBC # BLD AUTO: 19.9 K/UL (ref 4.3–11.1)

## 2021-09-27 PROCEDURE — 87040 BLOOD CULTURE FOR BACTERIA: CPT

## 2021-09-27 PROCEDURE — 85027 COMPLETE CBC AUTOMATED: CPT

## 2021-09-27 PROCEDURE — 94760 N-INVAS EAR/PLS OXIMETRY 1: CPT

## 2021-09-27 PROCEDURE — 36415 COLL VENOUS BLD VENIPUNCTURE: CPT

## 2021-09-27 PROCEDURE — 74011250637 HC RX REV CODE- 250/637: Performed by: FAMILY MEDICINE

## 2021-09-27 PROCEDURE — 80048 BASIC METABOLIC PNL TOTAL CA: CPT

## 2021-09-27 PROCEDURE — 74011250636 HC RX REV CODE- 250/636: Performed by: STUDENT IN AN ORGANIZED HEALTH CARE EDUCATION/TRAINING PROGRAM

## 2021-09-27 PROCEDURE — 65270000029 HC RM PRIVATE

## 2021-09-27 PROCEDURE — 84145 PROCALCITONIN (PCT): CPT

## 2021-09-27 PROCEDURE — 83605 ASSAY OF LACTIC ACID: CPT

## 2021-09-27 PROCEDURE — 74011250637 HC RX REV CODE- 250/637: Performed by: NURSE PRACTITIONER

## 2021-09-27 PROCEDURE — 74011250636 HC RX REV CODE- 250/636: Performed by: HOSPITALIST

## 2021-09-27 PROCEDURE — 74011250637 HC RX REV CODE- 250/637: Performed by: HOSPITALIST

## 2021-09-27 PROCEDURE — 94640 AIRWAY INHALATION TREATMENT: CPT

## 2021-09-27 PROCEDURE — 77010033711 HC HIGH FLOW OXYGEN

## 2021-09-27 RX ORDER — LOPERAMIDE HYDROCHLORIDE 2 MG/1
4 CAPSULE ORAL ONCE
Status: COMPLETED | OUTPATIENT
Start: 2021-09-27 | End: 2021-09-27

## 2021-09-27 RX ADMIN — Medication 10 ML: at 05:30

## 2021-09-27 RX ADMIN — BUDESONIDE AND FORMOTEROL FUMARATE DIHYDRATE 2 PUFF: 80; 4.5 AEROSOL RESPIRATORY (INHALATION) at 21:05

## 2021-09-27 RX ADMIN — ENOXAPARIN SODIUM 40 MG: 40 INJECTION SUBCUTANEOUS at 08:31

## 2021-09-27 RX ADMIN — CLONAZEPAM 0.5 MG: 0.5 TABLET ORAL at 21:19

## 2021-09-27 RX ADMIN — Medication 10 ML: at 15:31

## 2021-09-27 RX ADMIN — LOPERAMIDE HYDROCHLORIDE 4 MG: 2 CAPSULE ORAL at 06:47

## 2021-09-27 RX ADMIN — GUAIFENESIN 100 MG: 200 SOLUTION ORAL at 06:17

## 2021-09-27 RX ADMIN — DEXAMETHASONE SODIUM PHOSPHATE 6 MG: 10 INJECTION INTRAMUSCULAR; INTRAVENOUS at 08:31

## 2021-09-27 RX ADMIN — Medication 10 ML: at 21:52

## 2021-09-27 RX ADMIN — GUAIFENESIN 100 MG: 200 SOLUTION ORAL at 15:31

## 2021-09-27 RX ADMIN — ACETAMINOPHEN 650 MG: 325 TABLET ORAL at 16:32

## 2021-09-27 RX ADMIN — BUDESONIDE AND FORMOTEROL FUMARATE DIHYDRATE 2 PUFF: 80; 4.5 AEROSOL RESPIRATORY (INHALATION) at 08:00

## 2021-09-27 RX ADMIN — BARICITINIB 4 MG: 2 TABLET, FILM COATED ORAL at 08:31

## 2021-09-27 NOTE — PROGRESS NOTES
MSN CM:  Patient currently on 15L NC. Patient has no discharge needs at this time. Will continue to attempt to wean oxygen. Case Management will continue to follow.

## 2021-09-27 NOTE — PROGRESS NOTES
Hospitalist Progress Note   Admit Date:  2021  6:44 PM   Name:  Mya Dawn. Age:  40 y.o. Sex:  male  :  1975   MRN:  [id number]   Room:  [facility]/    Presenting Complaint: No chief complaint on file. Reason(s) for Admission: Pneumonia due to COVID-19 virus [U07.1, J12.82]     Hospital Course & Interval History:   41 yo CM with no past medical history presented with a 5 day history of worsening cough and shortness of breath. He went to [facility] prior to coming to [facility] was swabbed for COVID. In ED he desatted to high 80's on RA. CXR shows covid PNA. Subjective (21): Pt still requiring 15LHF NC same as yesterday. Sitting up in bed eating lunch. However, he reported feeling good, no worsening SOB. Reported nasal dryness. Denies fever, chills, chest pain. Assessment & Plan:     Acute respiratory failure with hypoxia 2/2 Covid-19 Pneumonia, worsening  Symptom onset 5 days, tested positive , Unvaccinated. In ED he desatted to high 80's on RA. CXR shows covid PNA. CT chest negative for PE. Procal negative. Dexamethasone for 10 days EOT 10/1/21  Received Remdesivir 2 doses prior to switching to Baricitinib on   Cont Baricitinib EOT 10/6  Cont IS and albuterol prn  Cont Symbicort  Encourage proning  CRP trending down. D-Dimer trending stable  On [id number]. Wean O2 as tolerated  OOB in chair. Leukocytosis, new  Sepsis 2/2 Covid infection, new  WBC 19K this AM  with HR>100 with Covid-infection. Procal on  was negative.  Most likely due to covid and steroids but will obtain infectious workup as pt on Baricitinib  Repeat CXR in AM, procal, obtain BCX  Repeat CBC in AM  Sputum Cx pending  Monitor w/o abx for now      Dispo/Discharge Planning:      >2 days pending improvement    MDM:    Due to patient's current comorbid conditions as well as presenting problems, patient is at high risk for complications/further decompensation during the hospitalization. Diet:  ADULT DIET Regular; 3 carb choices (45 gm/meal)  ADULT ORAL NUTRITION SUPPLEMENT Breakfast, Lunch, Dinner; Standard High Calorie/High Protein  DVT PPx: Lovenox SQ  Code status: Full Code    Hospital Problems as of 9/27/2021 Never Reviewed        Codes Class Noted - Resolved POA    * (Principal) Pneumonia due to COVID-19 virus ICD-10-CM: U07.1, J12.82  ICD-9-CM: 480.8, 079.89  9/21/2021 - Present Unknown        Acute respiratory failure with hypoxia [facility]) ICD-10-CM: J96.01  ICD-9-CM: 518.81  9/20/2021 - Present Yes              Objective:     Patient Vitals for the past 24 hrs:   Temp Pulse Resp BP SpO2   09/27/21 1109 98.8 °F (37.1 °C) (!) 109 20 90/67 96 %   09/27/21 0803 98.7 °F (37.1 °C) 100 20 98/67 95 %   09/27/21 0751 -- -- -- -- 96 %   09/27/21 0344 97.5 °F (36.4 °C) 72 19 98/65 90 %   09/26/21 2304 98.7 °F (37.1 °C) (!) 106 18 111/75 94 %   09/26/21 2101 -- 100 20 106/72 --   09/26/21 1927 98.9 °F (37.2 °C) (!) 114 18 (!) 98/58 92 %   09/26/21 1919 -- -- -- -- 95 %   09/26/21 1538 98.4 °F (36.9 °C) (!) 108 18 (!) 105/59 92 %     Oxygen Therapy  O2 Sat (%): 96 % (09/27/21 1109)  Pulse via Oximetry: 108 beats per minute (09/27/21 0751)  O2 Device: Hi flow nasal cannula (09/27/21 0751)  Skin Assessment: Clean, dry, & intact (09/25/21 0817)  O2 Flow Rate (L/min): 15 l/min (09/27/21 0751)    Estimated body mass index is 30.42 kg/m² as calculated from the following:    Height as of this encounter: 5' 9\" (1.753 m). Weight as of this encounter: 93.4 kg (206 lb). Intake/Output Summary (Last 24 hours) at 9/27/2021 1231  Last data filed at 9/27/2021 0344  Gross per 24 hour   Intake 900 ml   Output --   Net 900 ml         Physical Exam:     General:    Well nourished. No overt distress  Head:  Normocephalic, atraumatic  Eyes:  Sclerae appear normal.  Pupils equally round. ENT:  Nares appear normal, no drainage. Moist oral mucosa  Neck:  No restricted ROM.   Trachea midline   CV: Tachycardic. No m/r/g. No jugular venous distension. Lungs:   Rhonchi at lung bases. Respirations even, unlabored  Abdomen: Bowel sounds present. Soft, nontender, nondistended. Extremities: No cyanosis or clubbing. No edema  Skin:     No rashes and normal coloration. Warm and dry. Neuro:  Cranial nerves II-XII grossly intact. Sensation intact  Psych:  Normal mood and affect. Alert and oriented x3    I have reviewed ordered lab tests and independently visualized imaging below:    Last 24hr Labs:  Recent Results (from the past 24 hour(s))   CBC W/O DIFF    Collection Time: 09/27/21  6:25 AM   Result Value Ref Range    WBC 19.9 (H) 4.3 - 11.1 K/uL    RBC 6.13 (H) 4.23 - 5.6 M/uL    HGB 16.9 13.6 - 17.2 g/dL    HCT 51.8 (H) 41.1 - 50.3 %    MCV 84.5 79.6 - 97.8 FL    MCH 27.6 26.1 - 32.9 PG    MCHC 32.6 31.4 - 35.0 g/dL    RDW 13.1 11.9 - 14.6 %    PLATELET 518 (H) 050 - 450 K/uL    MPV 9.3 (L) 9.4 - 12.3 FL    ABSOLUTE NRBC 0.00 0.0 - 0.2 K/uL   METABOLIC PANEL, BASIC    Collection Time: 09/27/21  6:25 AM   Result Value Ref Range    Sodium 141 136 - 145 mmol/L    Potassium 4.5 3.5 - 5.1 mmol/L    Chloride 105 98 - 107 mmol/L    CO2 29 21 - 32 mmol/L    Anion gap 7 7 - 16 mmol/L    Glucose 85 65 - 100 mg/dL    BUN 21 6 - 23 MG/DL    Creatinine 0.75 (L) 0.8 - 1.5 MG/DL    GFR est AA >60 >60 ml/min/1.73m2    GFR est non-AA >60 >60 ml/min/1.73m2    Calcium 9.1 8.3 - 10.4 MG/DL       All Micro Results     Procedure Component Value Units Date/Time    CULTURE, RESPIRATORY/SPUTUM/BRONCH Valere disco volante [[id number]]     Order Status: Sent Specimen: Sputum           Other Studies:  No results found.     Current Meds:  Current Facility-Administered Medications   Medication Dose Route Frequency    guaiFENesin (ROBITUSSIN) 100 mg/5 mL oral liquid 100 mg  100 mg Oral Q4H PRN    budesonide-formoterol (SYMBICORT) 80-4.5 mcg inhaler  2 Puff Inhalation BID RT    albuterol (PROVENTIL HFA, VENTOLIN HFA, PROAIR HFA) inhaler 2 Puff  2 Puff Inhalation Q6H PRN    clonazePAM (KlonoPIN) tablet 0.5 mg  0.5 mg Oral BID PRN    baricitinib (OLUMIANT) tablet 4 mg  4 mg Oral DAILY    enoxaparin (LOVENOX) injection 40 mg  40 mg SubCUTAneous DAILY    sodium chloride (NS) flush 5-40 mL  5-40 mL IntraVENous Q8H    sodium chloride (NS) flush 5-40 mL  5-40 mL IntraVENous PRN    acetaminophen (TYLENOL) tablet 650 mg  650 mg Oral Q6H PRN    Or    acetaminophen (TYLENOL) suppository 650 mg  650 mg Rectal Q6H PRN    polyethylene glycol (MIRALAX) packet 17 g  17 g Oral DAILY PRN    ondansetron (ZOFRAN ODT) tablet 4 mg  4 mg Oral Q8H PRN    Or    ondansetron (ZOFRAN) injection 4 mg  4 mg IntraVENous Q6H PRN    dexamethasone (DECADRON) 10 mg/mL injection 6 mg  6 mg IntraVENous Q24H       Signed: Queen Moises DO    Part of this note may have been written by using a voice dictation software. The note has been proof read but may still contain some grammatical/other typographical errors.

## 2021-09-27 NOTE — PROGRESS NOTES
Beside shift report received from [facility]  Patient lying in bed  Respirations even and unlabored on 15L HFNC  No signs of distress  No needs expressed at this time  Safety measures in place

## 2021-09-28 ENCOUNTER — APPOINTMENT (OUTPATIENT)
Dept: GENERAL RADIOLOGY | Age: 46
DRG: 177 | End: 2021-09-28
Attending: FAMILY MEDICINE
Payer: COMMERCIAL

## 2021-09-28 PROBLEM — G47.9 DIFFICULTY SLEEPING: Status: ACTIVE | Noted: 2021-09-28

## 2021-09-28 PROBLEM — R00.0 TACHYCARDIA: Status: ACTIVE | Noted: 2021-09-28

## 2021-09-28 LAB
BASOPHILS # BLD: 0.1 K/UL (ref 0–0.2)
BASOPHILS NFR BLD: 0 % (ref 0–2)
DIFFERENTIAL METHOD BLD: ABNORMAL
EOSINOPHIL # BLD: 0.3 K/UL (ref 0–0.8)
EOSINOPHIL NFR BLD: 2 % (ref 0.5–7.8)
ERYTHROCYTE [DISTWIDTH] IN BLOOD BY AUTOMATED COUNT: 13.1 % (ref 11.9–14.6)
HCT VFR BLD AUTO: 48.8 % (ref 41.1–50.3)
HGB BLD-MCNC: 16.1 G/DL (ref 13.6–17.2)
IMM GRANULOCYTES # BLD AUTO: 0.4 K/UL (ref 0–0.5)
IMM GRANULOCYTES NFR BLD AUTO: 2 % (ref 0–5)
LYMPHOCYTES # BLD: 1.1 K/UL (ref 0.5–4.6)
LYMPHOCYTES NFR BLD: 6 % (ref 13–44)
MCH RBC QN AUTO: 27.8 PG (ref 26.1–32.9)
MCHC RBC AUTO-ENTMCNC: 33 G/DL (ref 31.4–35)
MCV RBC AUTO: 84.3 FL (ref 79.6–97.8)
MONOCYTES # BLD: 1.5 K/UL (ref 0.1–1.3)
MONOCYTES NFR BLD: 8 % (ref 4–12)
NEUTS SEG # BLD: 14.7 K/UL (ref 1.7–8.2)
NEUTS SEG NFR BLD: 82 % (ref 43–78)
NRBC # BLD: 0 K/UL (ref 0–0.2)
PLATELET # BLD AUTO: 519 K/UL (ref 150–450)
PMV BLD AUTO: 9.5 FL (ref 9.4–12.3)
RBC # BLD AUTO: 5.79 M/UL (ref 4.23–5.6)
WBC # BLD AUTO: 18 K/UL (ref 4.3–11.1)

## 2021-09-28 PROCEDURE — 74011250637 HC RX REV CODE- 250/637: Performed by: NURSE PRACTITIONER

## 2021-09-28 PROCEDURE — 36415 COLL VENOUS BLD VENIPUNCTURE: CPT

## 2021-09-28 PROCEDURE — 74011250637 HC RX REV CODE- 250/637: Performed by: FAMILY MEDICINE

## 2021-09-28 PROCEDURE — 74011250636 HC RX REV CODE- 250/636: Performed by: HOSPITALIST

## 2021-09-28 PROCEDURE — 74011250636 HC RX REV CODE- 250/636: Performed by: STUDENT IN AN ORGANIZED HEALTH CARE EDUCATION/TRAINING PROGRAM

## 2021-09-28 PROCEDURE — 94760 N-INVAS EAR/PLS OXIMETRY 1: CPT

## 2021-09-28 PROCEDURE — 94640 AIRWAY INHALATION TREATMENT: CPT

## 2021-09-28 PROCEDURE — 77010033711 HC HIGH FLOW OXYGEN

## 2021-09-28 PROCEDURE — 85025 COMPLETE CBC W/AUTO DIFF WBC: CPT

## 2021-09-28 PROCEDURE — 65270000029 HC RM PRIVATE

## 2021-09-28 PROCEDURE — 71045 X-RAY EXAM CHEST 1 VIEW: CPT

## 2021-09-28 RX ORDER — TRAZODONE HYDROCHLORIDE 50 MG/1
50 TABLET ORAL
Status: DISCONTINUED | OUTPATIENT
Start: 2021-09-28 | End: 2021-09-28

## 2021-09-28 RX ORDER — TRAZODONE HYDROCHLORIDE 50 MG/1
50 TABLET ORAL
Status: DISCONTINUED | OUTPATIENT
Start: 2021-09-28 | End: 2021-10-05 | Stop reason: HOSPADM

## 2021-09-28 RX ORDER — CHOLECALCIFEROL (VITAMIN D3) 125 MCG
5 CAPSULE ORAL
Status: DISCONTINUED | OUTPATIENT
Start: 2021-09-28 | End: 2021-10-05 | Stop reason: HOSPADM

## 2021-09-28 RX ADMIN — Medication 5 ML: at 17:13

## 2021-09-28 RX ADMIN — ENOXAPARIN SODIUM 40 MG: 40 INJECTION SUBCUTANEOUS at 08:36

## 2021-09-28 RX ADMIN — Medication 10 ML: at 05:04

## 2021-09-28 RX ADMIN — Medication 10 ML: at 21:10

## 2021-09-28 RX ADMIN — BUDESONIDE AND FORMOTEROL FUMARATE DIHYDRATE 2 PUFF: 80; 4.5 AEROSOL RESPIRATORY (INHALATION) at 20:15

## 2021-09-28 RX ADMIN — DEXAMETHASONE SODIUM PHOSPHATE 6 MG: 10 INJECTION INTRAMUSCULAR; INTRAVENOUS at 08:36

## 2021-09-28 RX ADMIN — BUDESONIDE AND FORMOTEROL FUMARATE DIHYDRATE 2 PUFF: 80; 4.5 AEROSOL RESPIRATORY (INHALATION) at 08:00

## 2021-09-28 RX ADMIN — Medication 5 MG: at 21:10

## 2021-09-28 RX ADMIN — BARICITINIB 4 MG: 2 TABLET, FILM COATED ORAL at 09:42

## 2021-09-28 NOTE — PROGRESS NOTES
Pt in bed sleeping, wakes up when called. Pt denies pain or SOB. No apparent distress noted at this time. Call light within reach. Pt still on 15L HF NC. Preparing to report off to oncoming RN.

## 2021-09-28 NOTE — PROGRESS NOTES
Problem: Falls - Risk of  Goal: *Absence of Falls  Description: Document Renee Sandoval Fall Risk and appropriate interventions in the flowsheet.   Outcome: Progressing Towards Goal  Note: Fall Risk Interventions:            Medication Interventions: Teach patient to arise slowly, Patient to call before getting OOB                   Problem: Patient Education: Go to Patient Education Activity  Goal: Patient/Family Education  Outcome: Progressing Towards Goal

## 2021-09-28 NOTE — PROGRESS NOTES
Beside shift report received from [facility] RN  Patient lying in bed  Respirations even and unlabored on 15L HFNC No signs of distress  No needs expressed at this time  Safety measures in place

## 2021-09-28 NOTE — PROGRESS NOTES
Hospitalist Progress Note   Admit Date:  2021  6:44 PM   Name:  Enrique Hidlago. Age:  40 y.o. Sex:  male  :  1975   MRN:  [id number]   Room:  [facility]/    Presenting Complaint: No chief complaint on file. Reason(s) for Admission: Pneumonia due to COVID-19 virus [U07.1, J12.82]     Hospital Course & Interval History:   39 yo CM with no past medical history presented with a 5 day history of worsening cough and shortness of breath. He went to [facility] prior to coming to [facility] was swabbed for COVID. In ED he desatted to high 80's on RA. CXR shows covid PNA. Subjective (21): On [id number]. Feels like he can take deeper breaths. Still gets SOB when he moves around. He has trouble sleeping at night time with racing thoughts and anxiety. Denies fever, chills, chest pain. Assessment & Plan:     Acute respiratory failure with hypoxia 2/2 Covid-19 Pneumonia, worsening  Symptom onset 5 days, tested positive , Unvaccinated. In ED he desatted to high 80's on RA. CXR shows covid PNA. CT chest negative for PE. Procal negative. Dexamethasone for 10 days EOT 10/1/21  Received Remdesivir 2 doses prior to switching to Baricitinib on   Cont Baricitinib EOT 10/6  Cont IS and albuterol prn  Cont Symbicort  Encourage proning  CRP trending down. D-Dimer trending stable  On 14MR4SB. Wean O2 as tolerated    Leukocytosis, improving  Sepsis 2/2 Covid infection  WBC 19K  with HR>100 with Covid-infection. Procal on  and  again was negative. Most likely due to covid and steroids. BCX: pending  Monitor w/o abx for now    Tachycardia  Monitor on telemetry    Difficulty sleeping  Add melatonin nightly  Trazodone prn        Dispo/Discharge Planning:      >2 days pending improvement    MDM:    Due to patient's current comorbid conditions as well as presenting problems, patient is at high risk for complications/further decompensation during the hospitalization.       Diet:  ADULT DIET Regular; 3 carb choices (45 gm/meal)  ADULT ORAL NUTRITION SUPPLEMENT Breakfast, Lunch, Dinner; Standard High Calorie/High Protein  DVT PPx: Lovenox SQ  Code status: Full Code    Hospital Problems as of 9/28/2021 Never Reviewed        Codes Class Noted - Resolved POA    Sepsis due to COVID-19 [facility]) ICD-10-CM: U07.1, A41.89  ICD-9-CM: 079.89, 995.91  9/27/2021 - Present Unknown        * (Principal) Pneumonia due to COVID-19 virus ICD-10-CM: U07.1, J12.82  ICD-9-CM: 480.8, 079.89  9/21/2021 - Present Unknown        Acute respiratory failure with hypoxia [facility]) ICD-10-CM: J96.01  ICD-9-CM: 518.81  9/20/2021 - Present Yes              Objective:     Patient Vitals for the past 24 hrs:   Temp Pulse Resp BP SpO2   09/28/21 1105 98.4 °F (36.9 °C) (!) 126 20 102/66 94 %   09/28/21 0757 98 °F (36.7 °C) (!) 114 20 100/63 90 %   09/28/21 0729 -- -- -- -- 95 %   09/28/21 0315 98.1 °F (36.7 °C) 92 20 105/73 91 %   09/27/21 2253 97.9 °F (36.6 °C) (!) 102 20 105/75 96 %   09/27/21 2103 -- -- -- -- 95 %   09/27/21 1950 98.3 °F (36.8 °C) 98 20 107/73 92 %   09/27/21 1451 98.2 °F (36.8 °C) (!) 107 20 115/75 92 %     Oxygen Therapy  O2 Sat (%): 94 % (09/28/21 1105)  Pulse via Oximetry: 78 beats per minute (09/28/21 0729)  O2 Device: Heated; Hi flow nasal cannula (09/28/21 0729)  Skin Assessment: Clean, dry, & intact (09/25/21 0817)  O2 Flow Rate (L/min): 12 l/min (weaned from 15L) (09/28/21 0734)  FIO2 (%): 100 % (09/27/21 2103)    Estimated body mass index is 30.42 kg/m² as calculated from the following:    Height as of this encounter: 5' 9\" (1.753 m). Weight as of this encounter: 93.4 kg (206 lb). Intake/Output Summary (Last 24 hours) at 9/28/2021 1128  Last data filed at 9/27/2021 1828  Gross per 24 hour   Intake 480 ml   Output 400 ml   Net 80 ml         Physical Exam:     General:    Well nourished. No overt distress  Head:  Normocephalic, atraumatic  Eyes:  Sclerae appear normal.  Pupils equally round.   ENT:  Nares appear normal, no drainage. Moist oral mucosa  Neck:  No restricted ROM. Trachea midline   CV:   Tachycardic. No m/r/g. No jugular venous distension. Lungs:   Rhonchi at lung bases. Respirations even, unlabored  Abdomen: Bowel sounds present. Soft, nontender, nondistended. Extremities: No cyanosis or clubbing. No edema  Skin:     No rashes and normal coloration. Warm and dry. Neuro:  Cranial nerves II-XII grossly intact. Sensation intact  Psych:  Normal mood and affect. Alert and oriented x3    I have reviewed ordered lab tests and independently visualized imaging below:    Last 24hr Labs:  Recent Results (from the past 24 hour(s))   LACTIC ACID    Collection Time: 09/27/21  4:17 PM   Result Value Ref Range    Lactic acid 2.2 (H) 0.4 - 2.0 MMOL/L   PROCALCITONIN    Collection Time: 09/27/21  4:17 PM   Result Value Ref Range    Procalcitonin <0.05 ng/mL   CBC WITH AUTOMATED DIFF    Collection Time: 09/28/21  5:24 AM   Result Value Ref Range    WBC 18.0 (H) 4.3 - 11.1 K/uL    RBC 5.79 (H) 4.23 - 5.6 M/uL    HGB 16.1 13.6 - 17.2 g/dL    HCT 48.8 41.1 - 50.3 %    MCV 84.3 79.6 - 97.8 FL    MCH 27.8 26.1 - 32.9 PG    MCHC 33.0 31.4 - 35.0 g/dL    RDW 13.1 11.9 - 14.6 %    PLATELET 312 (H) 938 - 450 K/uL    MPV 9.5 9.4 - 12.3 FL    ABSOLUTE NRBC 0.00 0.0 - 0.2 K/uL    DF AUTOMATED      NEUTROPHILS 82 (H) 43 - 78 %    LYMPHOCYTES 6 (L) 13 - 44 %    MONOCYTES 8 4.0 - 12.0 %    EOSINOPHILS 2 0.5 - 7.8 %    BASOPHILS 0 0.0 - 2.0 %    IMMATURE GRANULOCYTES 2 0.0 - 5.0 %    ABS. NEUTROPHILS 14.7 (H) 1.7 - 8.2 K/UL    ABS. LYMPHOCYTES 1.1 0.5 - 4.6 K/UL    ABS. MONOCYTES 1.5 (H) 0.1 - 1.3 K/UL    ABS. EOSINOPHILS 0.3 0.0 - 0.8 K/UL    ABS. BASOPHILS 0.1 0.0 - 0.2 K/UL    ABS. IMM. GRANS.  0.4 0.0 - 0.5 K/UL       All Micro Results     Procedure Component Value Units Date/Time    CULTURE, BLOOD [[id number]] Collected: 09/27/21 1617    Order Status: Completed Specimen: Blood Updated: 09/27/21 4977    CULTURE, BLOOD [[id number]] Collected: 09/27/21 1617    Order Status: Completed Specimen: Blood Updated: 09/27/21 1656    CULTURE, RESPIRATORY/SPUTUM/BRONCH Braeden Loupe STAIN [[id number]] Collected: 09/25/21 1715    Order Status: Canceled Specimen: Sputum           Other Studies:  XR CHEST SNGL V    Result Date: 9/28/2021  EXAM: Chest x-ray. INDICATION: Dyspnea. Covid 19. COMPARISON: September 25, 2021. TECHNIQUE: Frontal view chest x-ray. FINDINGS: Patchy infiltrate throughout the right lung is unchanged. There is mildly progressed left lung base infiltrate. The heart size is normal. No pneumothorax or pleural effusion is seen. Unchanged right and mildly progressed left lung infiltrates. Current Meds:  Current Facility-Administered Medications   Medication Dose Route Frequency    sodium chloride (OCEAN) 0.65 % nasal squeeze bottle 2 Spray  2 Spray Both Nostrils Q2H PRN    guaiFENesin (ROBITUSSIN) 100 mg/5 mL oral liquid 100 mg  100 mg Oral Q4H PRN    budesonide-formoterol (SYMBICORT) 80-4.5 mcg inhaler  2 Puff Inhalation BID RT    albuterol (PROVENTIL HFA, VENTOLIN HFA, PROAIR HFA) inhaler 2 Puff  2 Puff Inhalation Q6H PRN    clonazePAM (KlonoPIN) tablet 0.5 mg  0.5 mg Oral BID PRN    baricitinib (OLUMIANT) tablet 4 mg  4 mg Oral DAILY    enoxaparin (LOVENOX) injection 40 mg  40 mg SubCUTAneous DAILY    sodium chloride (NS) flush 5-40 mL  5-40 mL IntraVENous Q8H    sodium chloride (NS) flush 5-40 mL  5-40 mL IntraVENous PRN    acetaminophen (TYLENOL) tablet 650 mg  650 mg Oral Q6H PRN    Or    acetaminophen (TYLENOL) suppository 650 mg  650 mg Rectal Q6H PRN    polyethylene glycol (MIRALAX) packet 17 g  17 g Oral DAILY PRN    ondansetron (ZOFRAN ODT) tablet 4 mg  4 mg Oral Q8H PRN    Or    ondansetron (ZOFRAN) injection 4 mg  4 mg IntraVENous Q6H PRN    dexamethasone (DECADRON) 10 mg/mL injection 6 mg  6 mg IntraVENous Q24H       Signed:   Christopher Hall DO    Part of this note may have been written by using a voice dictation software. The note has been proof read but may still contain some grammatical/other typographical errors.

## 2021-09-28 NOTE — PROGRESS NOTES
Assumed pt care. Pt in bed resting, watching TV. Pt denies pain or SOB at this time. Pt on 15 L HF NC with RR stable.

## 2021-09-29 PROBLEM — R19.7 DIARRHEA: Status: ACTIVE | Noted: 2021-09-29

## 2021-09-29 PROBLEM — D72.829 LEUKOCYTOSIS: Status: ACTIVE | Noted: 2021-09-29

## 2021-09-29 LAB
ANION GAP SERPL CALC-SCNC: 13 MMOL/L (ref 7–16)
BASOPHILS # BLD: 0.2 K/UL (ref 0–0.2)
BASOPHILS NFR BLD: 1 % (ref 0–2)
BUN SERPL-MCNC: 18 MG/DL (ref 6–23)
CALCIUM SERPL-MCNC: 9.3 MG/DL (ref 8.3–10.4)
CHLORIDE SERPL-SCNC: 105 MMOL/L (ref 98–107)
CO2 SERPL-SCNC: 23 MMOL/L (ref 21–32)
CREAT SERPL-MCNC: 0.64 MG/DL (ref 0.8–1.5)
DIFFERENTIAL METHOD BLD: ABNORMAL
EOSINOPHIL # BLD: 0.4 K/UL (ref 0–0.8)
EOSINOPHIL NFR BLD: 2 % (ref 0.5–7.8)
ERYTHROCYTE [DISTWIDTH] IN BLOOD BY AUTOMATED COUNT: 13.2 % (ref 11.9–14.6)
GLUCOSE SERPL-MCNC: 78 MG/DL (ref 65–100)
HCT VFR BLD AUTO: 50.8 % (ref 41.1–50.3)
HGB BLD-MCNC: 16.4 G/DL (ref 13.6–17.2)
IMM GRANULOCYTES # BLD AUTO: 0.4 K/UL (ref 0–0.5)
IMM GRANULOCYTES NFR BLD AUTO: 2 % (ref 0–5)
LYMPHOCYTES # BLD: 1 K/UL (ref 0.5–4.6)
LYMPHOCYTES NFR BLD: 5 % (ref 13–44)
MCH RBC QN AUTO: 28 PG (ref 26.1–32.9)
MCHC RBC AUTO-ENTMCNC: 32.3 G/DL (ref 31.4–35)
MCV RBC AUTO: 86.8 FL (ref 79.6–97.8)
MONOCYTES # BLD: 1.6 K/UL (ref 0.1–1.3)
MONOCYTES NFR BLD: 8 % (ref 4–12)
NEUTS SEG # BLD: 15.8 K/UL (ref 1.7–8.2)
NEUTS SEG NFR BLD: 82 % (ref 43–78)
NRBC # BLD: 0 K/UL (ref 0–0.2)
PLATELET # BLD AUTO: 464 K/UL (ref 150–450)
PLATELET COMMENTS,PCOM: ABNORMAL
PMV BLD AUTO: 9.9 FL (ref 9.4–12.3)
POTASSIUM SERPL-SCNC: 3.9 MMOL/L (ref 3.5–5.1)
RBC # BLD AUTO: 5.85 M/UL (ref 4.23–5.6)
RBC MORPH BLD: ABNORMAL
SODIUM SERPL-SCNC: 141 MMOL/L (ref 136–145)
WBC # BLD AUTO: 19.4 K/UL (ref 4.3–11.1)
WBC MORPH BLD: ABNORMAL

## 2021-09-29 PROCEDURE — 77010033711 HC HIGH FLOW OXYGEN

## 2021-09-29 PROCEDURE — 74011250636 HC RX REV CODE- 250/636: Performed by: HOSPITALIST

## 2021-09-29 PROCEDURE — 74011250637 HC RX REV CODE- 250/637: Performed by: FAMILY MEDICINE

## 2021-09-29 PROCEDURE — 80048 BASIC METABOLIC PNL TOTAL CA: CPT

## 2021-09-29 PROCEDURE — 74011250636 HC RX REV CODE- 250/636: Performed by: STUDENT IN AN ORGANIZED HEALTH CARE EDUCATION/TRAINING PROGRAM

## 2021-09-29 PROCEDURE — 94640 AIRWAY INHALATION TREATMENT: CPT

## 2021-09-29 PROCEDURE — 36415 COLL VENOUS BLD VENIPUNCTURE: CPT

## 2021-09-29 PROCEDURE — 94760 N-INVAS EAR/PLS OXIMETRY 1: CPT

## 2021-09-29 PROCEDURE — 85025 COMPLETE CBC W/AUTO DIFF WBC: CPT

## 2021-09-29 PROCEDURE — 65270000029 HC RM PRIVATE

## 2021-09-29 PROCEDURE — 74011250637 HC RX REV CODE- 250/637: Performed by: NURSE PRACTITIONER

## 2021-09-29 RX ORDER — SAME BUTANEDISULFONATE/BETAINE 400-600 MG
250 POWDER IN PACKET (EA) ORAL 2 TIMES DAILY
Status: DISCONTINUED | OUTPATIENT
Start: 2021-09-29 | End: 2021-10-05 | Stop reason: HOSPADM

## 2021-09-29 RX ADMIN — BUDESONIDE AND FORMOTEROL FUMARATE DIHYDRATE 2 PUFF: 80; 4.5 AEROSOL RESPIRATORY (INHALATION) at 19:29

## 2021-09-29 RX ADMIN — ENOXAPARIN SODIUM 40 MG: 40 INJECTION SUBCUTANEOUS at 08:09

## 2021-09-29 RX ADMIN — DEXAMETHASONE SODIUM PHOSPHATE 6 MG: 10 INJECTION INTRAMUSCULAR; INTRAVENOUS at 08:10

## 2021-09-29 RX ADMIN — BUDESONIDE AND FORMOTEROL FUMARATE DIHYDRATE 2 PUFF: 80; 4.5 AEROSOL RESPIRATORY (INHALATION) at 07:46

## 2021-09-29 RX ADMIN — Medication 10 ML: at 21:01

## 2021-09-29 RX ADMIN — Medication 250 MG: at 12:30

## 2021-09-29 RX ADMIN — Medication 10 ML: at 17:16

## 2021-09-29 RX ADMIN — TRAZODONE HYDROCHLORIDE 50 MG: 50 TABLET ORAL at 22:00

## 2021-09-29 RX ADMIN — Medication 250 MG: at 17:17

## 2021-09-29 RX ADMIN — ALBUTEROL SULFATE 2 PUFF: 90 AEROSOL, METERED RESPIRATORY (INHALATION) at 07:46

## 2021-09-29 RX ADMIN — BARICITINIB 4 MG: 2 TABLET, FILM COATED ORAL at 08:09

## 2021-09-29 RX ADMIN — Medication 10 ML: at 05:47

## 2021-09-29 RX ADMIN — Medication 5 MG: at 21:01

## 2021-09-29 NOTE — PROGRESS NOTES
Sitting up in bed. Alert, oriented. Oxygen remains on at 12lpm/hf. Lung sounds diminished. Remains on remote tele with ST reported rate 103. Abdomen soft, bs present. Appetite good. No edema, voiding w/o any difficulty. Iv right arm intact, site healthy. Labs resulted and reviewed, wbc slightly increased ,  Rails up x3, aware to call for asst. Denies any pain.

## 2021-09-29 NOTE — PROGRESS NOTES
Hospitalist Progress Note   Admit Date:  2021  6:44 PM   Name:  Sheila Mejias. Age:  40 y.o. Sex:  male  :  1975   MRN:  [id number]   Room:  [facility]/    Presenting Complaint: No chief complaint on file. Reason(s) for Admission: Pneumonia due to COVID-19 virus [U07.1, J12.82]     Hospital Course & Interval History:   39 yo CM with no past medical history presented with a 5 day history of worsening cough and shortness of breath. He went to [facility] prior to [facility] was swabbed for COVID. In ED he desatted to high 80's on RA. CXR shows covid PNA. Subjective (21):    Gets SOB with exertion. Has 4-5x episodes of watery diarrhea, no blood. Denies abdominal pain. Denies fever, chills, chest pain. Assessment & Plan:     Acute respiratory failure with hypoxia 2/2 Covid-19 Pneumonia, worsening  Symptom onset 5 days, tested positive , Unvaccinated. In ED he desatted to high 80's on RA. CXR shows covid PNA. CT chest negative for PE. Procal negative. pBNP negative. Dexamethasone for 10 days EOT 10/1/21  Received Remdesivir 2 doses prior to switching to Baricitinib on   Cont Baricitinib EOT 10/6  Cont IS and albuterol prn  Cont Symbicort  Encourage proning  CRP trending down. D-Dimer trending stable  CXR with stable findings  Cont [id number]. Wean O2 as tolerated. OOB in chair. Leukocytosis, persistent  Sepsis 2/2 Covid infection  WBC 19K  with HR>100 with Covid-infection. Procal on  and  again was negative. Most likely due to covid-19 infection and steroids.   [address] : NGTD  Monitor w/o abx for now pending stool studies    Diarrhea  Could be d/t Covid-infection  Add probiotics  Obtain stool cultures and C-Diff  Supportive care    Sinus Tachycardia  Monitor on telemetry    Difficulty sleeping  Cont melatonin nightly  Trazodone prn        Dispo/Discharge Planning:      >2 days pending improvement    MDM:    Due to patient's current comorbid conditions as well as presenting problems, patient is at high risk for complications/further decompensation during the hospitalization.       Diet:  ADULT DIET Regular; 3 carb choices (45 gm/meal)  ADULT ORAL NUTRITION SUPPLEMENT Breakfast, Lunch, Dinner; Standard High Calorie/High Protein  DVT PPx: Lovenox SQ  Code status: Full Code    Hospital Problems as of 9/29/2021 Never Reviewed        Codes Class Noted - Resolved POA    Leukocytosis ICD-10-CM: D72.829  ICD-9-CM: 288.60  9/29/2021 - Present Unknown        Diarrhea ICD-10-CM: R19.7  ICD-9-CM: 787.91  9/29/2021 - Present Unknown        Tachycardia ICD-10-CM: R00.0  ICD-9-CM: 785.0  9/28/2021 - Present Unknown        Difficulty sleeping ICD-10-CM: G47.9  ICD-9-CM: 780.50  9/28/2021 - Present Unknown        Sepsis due to COVID-19 [facility]) ICD-10-CM: U07.1, A41.89  ICD-9-CM: 079.89, 995.91  9/27/2021 - Present Unknown        * (Principal) Pneumonia due to COVID-19 virus ICD-10-CM: U07.1, J12.82  ICD-9-CM: 480.8, 079.89  9/21/2021 - Present Unknown        Acute respiratory failure with hypoxia [facility]) ICD-10-CM: J96.01  ICD-9-CM: 518.81  9/20/2021 - Present Yes              Objective:     Patient Vitals for the past 24 hrs:   Temp Pulse Resp BP SpO2   09/29/21 1113 98.4 °F (36.9 °C) (!) 116 20 110/75 94 %   09/29/21 0746 -- -- -- -- 94 %   09/29/21 0740 98.4 °F (36.9 °C) (!) 104 20 101/67 93 %   09/29/21 0253 98 °F (36.7 °C) 84 20 116/78 97 %   09/28/21 2244 98.2 °F (36.8 °C) 93 20 98/69 93 %   09/28/21 2015 -- -- -- -- 95 %   09/28/21 1955 98.4 °F (36.9 °C) (!) 106 20 103/71 93 %   09/28/21 1532 98.4 °F (36.9 °C) (!) 113 20 104/67 94 %     Oxygen Therapy  O2 Sat (%): 94 % (09/29/21 1113)  Pulse via Oximetry: 102 beats per minute (09/29/21 0746)  O2 Device: Hi flow nasal cannula (09/29/21 0746)  Skin Assessment: Clean, dry, & intact (09/25/21 0817)  O2 Flow Rate (L/min): 12 l/min (09/29/21 0746)  FIO2 (%): 94 % (09/29/21 0746)    Estimated body mass index is 30.42 kg/m² as calculated from the following:    Height as of this encounter: 5' 9\" (1.753 m). Weight as of this encounter: 93.4 kg (206 lb). Intake/Output Summary (Last 24 hours) at 9/29/2021 1121  Last data filed at 9/29/2021 0942  Gross per 24 hour   Intake 480 ml   Output 500 ml   Net -20 ml         Physical Exam:     General:    Well nourished. No overt distress  Head:  Normocephalic, atraumatic  Eyes:  Sclerae appear normal.  Pupils equally round. ENT:  Nares appear normal, no drainage. Moist oral mucosa  Neck:  No restricted ROM. Trachea midline   CV:   Tachycardic but regular rhythm. No m/r/g. No jugular venous distension. Lungs:   Mild rhonchi at lung bases. Respirations even, unlabored  Abdomen: Bowel sounds present. Soft, nontender, nondistended. Extremities: No cyanosis or clubbing. No edema  Skin:     No rashes and normal coloration. Warm and dry. Neuro:  Cranial nerves II-XII grossly intact. Sensation intact  Psych:  Normal mood and affect.   Alert and oriented x3    I have reviewed ordered lab tests and independently visualized imaging below:    Last 24hr Labs:  Recent Results (from the past 24 hour(s))   METABOLIC PANEL, BASIC    Collection Time: 09/29/21  6:45 AM   Result Value Ref Range    Sodium 141 136 - 145 mmol/L    Potassium 3.9 3.5 - 5.1 mmol/L    Chloride 105 98 - 107 mmol/L    CO2 23 21 - 32 mmol/L    Anion gap 13 7 - 16 mmol/L    Glucose 78 65 - 100 mg/dL    BUN 18 6 - 23 MG/DL    Creatinine 0.64 (L) 0.8 - 1.5 MG/DL    GFR est AA >60 >60 ml/min/1.73m2    GFR est non-AA >60 >60 ml/min/1.73m2    Calcium 9.3 8.3 - 10.4 MG/DL   CBC WITH AUTOMATED DIFF    Collection Time: 09/29/21  6:45 AM   Result Value Ref Range    WBC 19.4 (H) 4.3 - 11.1 K/uL    RBC 5.85 (H) 4.23 - 5.6 M/uL    HGB 16.4 13.6 - 17.2 g/dL    HCT 50.8 (H) 41.1 - 50.3 %    MCV 86.8 79.6 - 97.8 FL    MCH 28.0 26.1 - 32.9 PG    MCHC 32.3 31.4 - 35.0 g/dL    RDW 13.2 11.9 - 14.6 %    PLATELET 991 (H) 749 - 450 K/uL    MPV 9.9 9.4 - 12.3 FL    ABSOLUTE NRBC 0.00 0.0 - 0.2 K/uL    NEUTROPHILS 82 (H) 43 - 78 %    LYMPHOCYTES 5 (L) 13 - 44 %    MONOCYTES 8 4.0 - 12.0 %    EOSINOPHILS 2 0.5 - 7.8 %    BASOPHILS 1 0.0 - 2.0 %    IMMATURE GRANULOCYTES 2 0.0 - 5.0 %    ABS. NEUTROPHILS 15.8 (H) 1.7 - 8.2 K/UL    ABS. LYMPHOCYTES 1.0 0.5 - 4.6 K/UL    ABS. MONOCYTES 1.6 (H) 0.1 - 1.3 K/UL    ABS. EOSINOPHILS 0.4 0.0 - 0.8 K/UL    ABS. BASOPHILS 0.2 0.0 - 0.2 K/UL    ABS. IMM. GRANS. 0.4 0.0 - 0.5 K/UL    RBC COMMENTS       FIBRIN STRANDS PRESENT ON SMEAR, SUGGEST RECOLLECT  SLIGHT  ANISOCYTOSIS + POIKILOCYTOSIS      WBC COMMENTS Result Confirmed By Smear      PLATELET COMMENTS INCREASED      DF AUTOMATED         All Micro Results     Procedure Component Value Units Date/Time    C. DIFFICILE AG & TOXIN A/B [[id number]]     Order Status: Sent Specimen: Stool     CULTURE, STOOL [[id number]]     Order Status: Sent Specimen: Stool     CULTURE, BLOOD [[id number]] Collected: 09/27/21 1617    Order Status: Completed Specimen: Blood Updated: 09/29/21 0723     Special Requests: --        LEFT  HAND       Culture result: NO GROWTH 2 DAYS       CULTURE, BLOOD [[id number]] Collected: 09/27/21 1617    Order Status: Completed Specimen: Blood Updated: 09/29/21 0723     Special Requests: --        RIGHT  Antecubital       Culture result: NO GROWTH 2 DAYS       CULTURE, RESPIRATORY/SPUTUM/BRONCH Kay Elizabeth STAIN [[id number]] Collected: 09/25/21 1715    Order Status: Canceled Specimen: Sputum           Other Studies:  No results found.     Current Meds:  Current Facility-Administered Medications   Medication Dose Route Frequency    Saccharomyces boulardii (FLORASTOR) capsule 250 mg  250 mg Oral BID    melatonin tablet 5 mg  5 mg Oral QHS    traZODone (DESYREL) tablet 50 mg  50 mg Oral QHS PRN    sodium chloride (OCEAN) 0.65 % nasal squeeze bottle 2 Spray  2 Spray Both Nostrils Q2H PRN    guaiFENesin (ROBITUSSIN) 100 mg/5 mL oral liquid 100 mg  100 mg Oral Q4H PRN    budesonide-formoterol (SYMBICORT) 80-4.5 mcg inhaler  2 Puff Inhalation BID RT    albuterol (PROVENTIL HFA, VENTOLIN HFA, PROAIR HFA) inhaler 2 Puff  2 Puff Inhalation Q6H PRN    clonazePAM (KlonoPIN) tablet 0.5 mg  0.5 mg Oral BID PRN    baricitinib (OLUMIANT) tablet 4 mg  4 mg Oral DAILY    enoxaparin (LOVENOX) injection 40 mg  40 mg SubCUTAneous DAILY    sodium chloride (NS) flush 5-40 mL  5-40 mL IntraVENous Q8H    sodium chloride (NS) flush 5-40 mL  5-40 mL IntraVENous PRN    acetaminophen (TYLENOL) tablet 650 mg  650 mg Oral Q6H PRN    Or    acetaminophen (TYLENOL) suppository 650 mg  650 mg Rectal Q6H PRN    polyethylene glycol (MIRALAX) packet 17 g  17 g Oral DAILY PRN    ondansetron (ZOFRAN ODT) tablet 4 mg  4 mg Oral Q8H PRN    Or    ondansetron (ZOFRAN) injection 4 mg  4 mg IntraVENous Q6H PRN    dexamethasone (DECADRON) 10 mg/mL injection 6 mg  6 mg IntraVENous Q24H       Signed: Lata Flores DO    Part of this note may have been written by using a voice dictation software. The note has been proof read but may still contain some grammatical/other typographical errors.

## 2021-09-29 NOTE — PROGRESS NOTES
MSN, CM:  Patient continues to require 12L today. Patient has received Baricitinib, Decadron, and Remdesivir during admission. Patient has not discharge needs at this time. Case Management will continue to follow.

## 2021-09-29 NOTE — PROGRESS NOTES
Comprehensive Nutrition Assessment    Type and Reason for Visit: Reassess    Nutrition Recommendations/Plan:   Meals and Snacks:  Continue current diet. Nutrition Supplement Therapy:   Medical food supplement therapy:  Change Ensure Enlive once per day (this provides 350 kcal and 20 grams protein per bottle)     Malnutrition Assessment:  Malnutrition Status: At risk for malnutrition (specify) (pt reports x4 days poor po intake (9/23))    Nutrition Assessment:   Nutrition History: Pt states poor po intake since being at the hospital (4 days total--2 days at [facility] before transfer) due to loss of appetite. Pt reports several events of vomiting PTA. UBW of 230lbs stated by pt. No hx available per chart review. Nutrition Background: Pt with no notable PMH. Pt admitted due to PNA due to COVID-19. Daily Update:  Pt reports he is eating most to all of his meals. He states he is drinking 2 Ensure/day. Noted pt is having diarrhea today. He states this isn't affecting his appetite. Currently being ruled out for C.diff. Nutrition Related Findings:   NFPE deferred due to isolation precautions for COVID. Current Nutrition Therapies:  ADULT DIET Regular; 3 carb choices (45 gm/meal)  ADULT ORAL NUTRITION SUPPLEMENT Breakfast, Lunch, Dinner; Standard High Calorie/High Protein    Current Intake:   Average Meal Intake: % Average Supplement Intake: 51-75%      Anthropometric Measures:  Height: 5' 9\" (175.3 cm)  Current Body Wt: 93.4 kg (205 lb 14.6 oz) (9/27), Weight source: Bed scale  BMI: 30.4, Obese class 1 (BMI 30.0-34. 9)  Admission Body Weight: 229 lb 15 oz (stated per pt 9/20)  Ideal Body Weight (lbs) (Calculated): 160 lbs (73 kg), 143.7 %  Usual Body Wt: 104.3 kg (230 lb) (stated per pt; no hx per chart review), Percent weight change: 0          Edema: No data recorded   Estimated Daily Nutrient Needs:  Energy (kcal/day): 0695-9905 (Kcal/kg (25-30), Weight Used: Ideal (72.7kg))  Protein (g/day): 73-87 (1-1.2gm/kg) Weight Used: (Ideal (72.7kg))  Fluid (ml/day):   (1 ml/kcal)    Nutrition Diagnosis:   · Predicted inadequate energy intake related to  (loss of appetite) as evidenced by  (pt stated barrier to intake)    Nutrition Interventions:   Food and/or Nutrient Delivery: Continue current diet, Modify oral nutrition supplement     Coordination of Nutrition Care: Continue to monitor while inpatient    Goals:   Previous Goal Met: Goal(s) achieved  Active Goal: Meet >75% estimated nutrition needs by RD follow up. Nutrition Monitoring and Evaluation:      Food/Nutrient Intake Outcomes: Food and nutrient intake, Supplement intake  Physical Signs/Symptoms Outcomes: Biochemical data, GI status    Discharge Planning:     Too soon to determine    Electronically signed by Dean De La Vega MS, KARYN, GERMAN 9/29/2021 at 12:50 PM  Contact: [phone]    Disaster Mode Active

## 2021-09-29 NOTE — PROGRESS NOTES
Pt states has had diarrhea, states has had 4 stools. Collection container  Placed in toilet, aware specimen needed, Dr Mitul Moura on unit, aware also notified of increase in WBC today. No new med orders received at pr sent time for diarrhea.

## 2021-09-29 NOTE — PROGRESS NOTES
Remains  On remote tele with ST rate 110 reported. Appetite good, denies any further stools at present time. Denies any pain.   rmainss on hfn/c att 12 lpm.

## 2021-09-30 LAB
GLUCOSE BLD STRIP.AUTO-MCNC: 150 MG/DL (ref 65–100)
SERVICE CMNT-IMP: ABNORMAL

## 2021-09-30 PROCEDURE — 74011250636 HC RX REV CODE- 250/636: Performed by: HOSPITALIST

## 2021-09-30 PROCEDURE — 77010033711 HC HIGH FLOW OXYGEN

## 2021-09-30 PROCEDURE — 94760 N-INVAS EAR/PLS OXIMETRY 1: CPT

## 2021-09-30 PROCEDURE — 74011250637 HC RX REV CODE- 250/637: Performed by: HOSPITALIST

## 2021-09-30 PROCEDURE — 82962 GLUCOSE BLOOD TEST: CPT

## 2021-09-30 PROCEDURE — 94640 AIRWAY INHALATION TREATMENT: CPT

## 2021-09-30 PROCEDURE — 74011250637 HC RX REV CODE- 250/637: Performed by: FAMILY MEDICINE

## 2021-09-30 PROCEDURE — 74011250637 HC RX REV CODE- 250/637: Performed by: NURSE PRACTITIONER

## 2021-09-30 PROCEDURE — 65270000029 HC RM PRIVATE

## 2021-09-30 PROCEDURE — 74011250636 HC RX REV CODE- 250/636: Performed by: STUDENT IN AN ORGANIZED HEALTH CARE EDUCATION/TRAINING PROGRAM

## 2021-09-30 RX ORDER — DEXAMETHASONE 4 MG/1
6 TABLET ORAL DAILY
Status: COMPLETED | OUTPATIENT
Start: 2021-10-01 | End: 2021-10-01

## 2021-09-30 RX ADMIN — BUDESONIDE AND FORMOTEROL FUMARATE DIHYDRATE 2 PUFF: 80; 4.5 AEROSOL RESPIRATORY (INHALATION) at 19:48

## 2021-09-30 RX ADMIN — ENOXAPARIN SODIUM 40 MG: 40 INJECTION SUBCUTANEOUS at 08:11

## 2021-09-30 RX ADMIN — Medication 10 ML: at 14:07

## 2021-09-30 RX ADMIN — BARICITINIB 4 MG: 2 TABLET, FILM COATED ORAL at 08:11

## 2021-09-30 RX ADMIN — Medication 250 MG: at 16:56

## 2021-09-30 RX ADMIN — DEXAMETHASONE SODIUM PHOSPHATE 6 MG: 10 INJECTION INTRAMUSCULAR; INTRAVENOUS at 08:11

## 2021-09-30 RX ADMIN — Medication 10 ML: at 21:35

## 2021-09-30 RX ADMIN — ACETAMINOPHEN 650 MG: 325 TABLET ORAL at 08:46

## 2021-09-30 RX ADMIN — Medication 10 ML: at 06:12

## 2021-09-30 RX ADMIN — BUDESONIDE AND FORMOTEROL FUMARATE DIHYDRATE 2 PUFF: 80; 4.5 AEROSOL RESPIRATORY (INHALATION) at 07:34

## 2021-09-30 RX ADMIN — TRAZODONE HYDROCHLORIDE 50 MG: 50 TABLET ORAL at 21:35

## 2021-09-30 RX ADMIN — Medication 250 MG: at 08:11

## 2021-09-30 NOTE — PROGRESS NOTES
Received report from [facility]. Patient awake resting in bed. Respirations present. On 12 L HF NC. No signs of distress. AxO x4. No needs expressed. Bed low and locked. Call light within reach. Will continue to monitor.

## 2021-09-30 NOTE — PROGRESS NOTES
Hospitalist Progress Note   Admit Date:  2021  6:44 PM   Name:  Mary Harrington. Age:  40 y.o. Sex:  male  :  1975   MRN:  [id number]   Room:  [facility]    Presenting Complaint: No chief complaint on file. Reason(s) for Admission: Pneumonia due to COVID-19 virus [U07.1, J12.82]     Hospital Course & Interval History:   Stephan Moreno is a 40year old male with no significant PMGH who presented to the ER with a complaint of worsening SOB with associated cough. Noted to desat into the high 80s on RA. CXR showed COVID     Subjective (21):  Reports improvement in symptoms since admit. Denies fever, chills, N/V/D    Assessment & Plan:     Principal Problem:  Acute respiratory failure with hypoxia 2/2 Covid-19 Pneumonia, worsening  Symptom onset 5 days, tested positive , Unvaccinated. In ED he desatted to high 80's on RA. CXR shows covid PNA. CT chest negative for PE. Procal negative. pBNP negative. Dexamethasone for 10 days EOT 10/1/21  Received Remdesivir 2 doses prior to switching to Baricitinib on   Cont Baricitinib EOT 10/6  Cont IS and albuterol prn  Cont Symbicort  Encourage proning  CRP trending down. D-Dimer trending stable  CXR with stable findings  Cont [id number]. Wean O2 as tolerated. OOB in chair.   21 O2 decreased from 12L to 10L with sats remaining 93%; Monitor and continue weaning as able ; Tx as above      Leukocytosis, persistent  Sepsis 2/2 Covid infection  WBC 19K  with HR>100 with Covid-infection. Procal on  and  again was negative. Most likely due to covid-19 infection and steroids. [address] : NGTD  Monitor w/o abx for now pending stool studies   21 Patient clinically better;  Likely reactive; patient has elevated neutrophils; procal consistently negative; Continue to monitor    Diarrhea  Could be d/t Covid-infection  Add probiotics  Obtain stool cultures and C-Diff  Supportive care   21 No reported episodes today     Sinus Tachycardia  Monitor on telemetry   9/30/21 Resolved     Difficulty sleeping  Cont melatonin nightly  Trazodone prn          Dispo/Discharge Planning:      Pending improvement        Diet:  ADULT DIET Regular; 3 carb choices (45 gm/meal)  ADULT ORAL NUTRITION SUPPLEMENT Dinner; Standard High Calorie/High Protein  DVT PPx: Lovenox SQ   Code status: Full Code    Hospital Problems as of 9/30/2021 Never Reviewed        Codes Class Noted - Resolved POA    Leukocytosis ICD-10-CM: D72.829  ICD-9-CM: 288.60  9/29/2021 - Present Unknown        Diarrhea ICD-10-CM: R19.7  ICD-9-CM: 787.91  9/29/2021 - Present Unknown        Tachycardia ICD-10-CM: R00.0  ICD-9-CM: 785.0  9/28/2021 - Present Unknown        Difficulty sleeping ICD-10-CM: G47.9  ICD-9-CM: 780.50  9/28/2021 - Present Unknown        Sepsis due to COVID-19 [facility]) ICD-10-CM: U07.1, A41.89  ICD-9-CM: 079.89, 995.91  9/27/2021 - Present Unknown        * (Principal) Pneumonia due to COVID-19 virus ICD-10-CM: U07.1, J12.82  ICD-9-CM: 480.8, 079.89  9/21/2021 - Present Unknown        Acute respiratory failure with hypoxia [facility]) ICD-10-CM: J96.01  ICD-9-CM: 518.81  9/20/2021 - Present Yes              Objective:     Patient Vitals for the past 24 hrs:   Temp Pulse Resp BP SpO2   09/30/21 0954 -- -- -- -- 93 %   09/30/21 0800 -- 90 -- -- --   09/30/21 0732 98.2 °F (36.8 °C) 92 18 115/80 94 %   09/30/21 0312 98 °F (36.7 °C) 91 21 107/74 97 %   09/29/21 2244 98.2 °F (36.8 °C) 98 20 108/63 95 %   09/29/21 1933 98.5 °F (36.9 °C) 95 20 105/65 96 %   09/29/21 1929 -- -- -- -- 95 %   09/29/21 1550 98.4 °F (36.9 °C) (!) 116 20 112/61 95 %   09/29/21 1113 98.4 °F (36.9 °C) (!) 116 20 110/75 94 %     Oxygen Therapy  O2 Sat (%): 93 % (09/30/21 0954)  Pulse via Oximetry: 82 beats per minute (09/29/21 1929)  O2 Device: Hi flow nasal cannula (09/30/21 0954)  Skin Assessment: Clean, dry, & intact (09/25/21 0817)  O2 Flow Rate (L/min): 10 l/min (09/30/21 0954)  FIO2 (%): 94 % (09/29/21 9674)    Estimated body mass index is 30.42 kg/m² as calculated from the following:    Height as of this encounter: 5' 9\" (1.753 m). Weight as of this encounter: 93.4 kg (206 lb). No intake or output data in the 24 hours ending 09/30/21 1105      Physical Exam:     General:    Well nourished. No overt distress  Head:  Normocephalic, atraumatic  Eyes:  Sclerae appear normal.  Pupils equally round. ENT:  Nares appear normal, no drainage. Moist oral mucosa  Neck:  No restricted ROM. Trachea midline   CV:   RRR. No m/r/g. No jugular venous distension. Lungs:   CTAB. No wheezing, rhonchi, or rales. Respirations even, unlabored  Abdomen: Bowel sounds present. Soft, nontender, nondistended. Extremities: No cyanosis or clubbing. No edema  Skin:     No rashes and normal coloration. Warm and dry. Neuro:  Cranial nerves II-XII grossly intact. A&Ox3  Psych:  Normal mood and affect. I have reviewed ordered lab tests and independently visualized imaging below:    Last 24hr Labs:  No results found for this or any previous visit (from the past 24 hour(s)). All Micro Results     Procedure Component Value Units Date/Time    CULTURE, BLOOD [[id number]] Collected: 09/27/21 1617    Order Status: Completed Specimen: Blood Updated: 09/30/21 0744     Special Requests: --        LEFT  HAND       Culture result: NO GROWTH 3 DAYS       CULTURE, BLOOD [[id number]] Collected: 09/27/21 1617    Order Status: Completed Specimen: Blood Updated: 09/30/21 0744     Special Requests: --        RIGHT  Antecubital       Culture result: NO GROWTH 3 DAYS       C. DIFFICILE AG & TOXIN A/B [[id number]]     Order Status: Sent Specimen: Stool     CULTURE, STOOL [[id number]]     Order Status: Sent Specimen: Stool     CULTURE, RESPIRATORY/SPUTUM/BRONCH Karlaizabella Ellis [[id number]] Collected: 09/25/21 1715    Order Status: Canceled Specimen: Sputum           Other Studies:  No results found.     Current Meds:  Current Facility-Administered Medications   Medication Dose Route Frequency    [START ON 10/1/2021] dexAMETHasone (DECADRON) tablet 6 mg  6 mg Oral DAILY    Saccharomyces boulardii (FLORASTOR) capsule 250 mg  250 mg Oral BID    melatonin tablet 5 mg  5 mg Oral QHS    traZODone (DESYREL) tablet 50 mg  50 mg Oral QHS PRN    sodium chloride (OCEAN) 0.65 % nasal squeeze bottle 2 Spray  2 Spray Both Nostrils Q2H PRN    guaiFENesin (ROBITUSSIN) 100 mg/5 mL oral liquid 100 mg  100 mg Oral Q4H PRN    budesonide-formoterol (SYMBICORT) 80-4.5 mcg inhaler  2 Puff Inhalation BID RT    albuterol (PROVENTIL HFA, VENTOLIN HFA, PROAIR HFA) inhaler 2 Puff  2 Puff Inhalation Q6H PRN    clonazePAM (KlonoPIN) tablet 0.5 mg  0.5 mg Oral BID PRN    baricitinib (OLUMIANT) tablet 4 mg  4 mg Oral DAILY    enoxaparin (LOVENOX) injection 40 mg  40 mg SubCUTAneous DAILY    sodium chloride (NS) flush 5-40 mL  5-40 mL IntraVENous Q8H    sodium chloride (NS) flush 5-40 mL  5-40 mL IntraVENous PRN    acetaminophen (TYLENOL) tablet 650 mg  650 mg Oral Q6H PRN    Or    acetaminophen (TYLENOL) suppository 650 mg  650 mg Rectal Q6H PRN    polyethylene glycol (MIRALAX) packet 17 g  17 g Oral DAILY PRN    ondansetron (ZOFRAN ODT) tablet 4 mg  4 mg Oral Q8H PRN    Or    ondansetron (ZOFRAN) injection 4 mg  4 mg IntraVENous Q6H PRN       Signed:  Denver Hermann, NP    Part of this note may have been written by using a voice dictation software. The note has been proof read but may still contain some grammatical/other typographical errors.

## 2021-09-30 NOTE — PROGRESS NOTES
Patient awake resting in bed. Respirations present. On 10 L HF NC. No signs of distress. AxO x4. No needs expressed. Bed low and locked. Call light within reach. Preparing to give report to oncoming RN.

## 2021-10-01 LAB
ALBUMIN SERPL-MCNC: 3.2 G/DL (ref 3.5–5)
ALBUMIN/GLOB SERPL: 0.8 {RATIO} (ref 1.2–3.5)
ALP SERPL-CCNC: 102 U/L (ref 50–136)
ALT SERPL-CCNC: 179 U/L (ref 12–65)
ANION GAP SERPL CALC-SCNC: 7 MMOL/L (ref 7–16)
AST SERPL-CCNC: 60 U/L (ref 15–37)
BASOPHILS # BLD: 0.1 K/UL (ref 0–0.2)
BASOPHILS NFR BLD: 0 % (ref 0–2)
BILIRUB DIRECT SERPL-MCNC: 0.2 MG/DL
BILIRUB SERPL-MCNC: 0.7 MG/DL (ref 0.2–1.1)
BUN SERPL-MCNC: 16 MG/DL (ref 6–23)
CALCIUM SERPL-MCNC: 8.5 MG/DL (ref 8.3–10.4)
CHLORIDE SERPL-SCNC: 102 MMOL/L (ref 98–107)
CO2 SERPL-SCNC: 31 MMOL/L (ref 21–32)
CREAT SERPL-MCNC: 0.96 MG/DL (ref 0.8–1.5)
D DIMER PPP FEU-MCNC: 0.68 UG/ML(FEU)
DIFFERENTIAL METHOD BLD: ABNORMAL
EOSINOPHIL # BLD: 0.2 K/UL (ref 0–0.8)
EOSINOPHIL NFR BLD: 1 % (ref 0.5–7.8)
ERYTHROCYTE [DISTWIDTH] IN BLOOD BY AUTOMATED COUNT: 13 % (ref 11.9–14.6)
GLOBULIN SER CALC-MCNC: 4.2 G/DL (ref 2.3–3.5)
GLUCOSE SERPL-MCNC: 88 MG/DL (ref 65–100)
HCT VFR BLD AUTO: 51.4 % (ref 41.1–50.3)
HGB BLD-MCNC: 16.5 G/DL (ref 13.6–17.2)
IMM GRANULOCYTES # BLD AUTO: 0.1 K/UL (ref 0–0.5)
IMM GRANULOCYTES NFR BLD AUTO: 1 % (ref 0–5)
LACTATE SERPL-SCNC: 2 MMOL/L (ref 0.4–2)
LYMPHOCYTES # BLD: 1.5 K/UL (ref 0.5–4.6)
LYMPHOCYTES NFR BLD: 11 % (ref 13–44)
MCH RBC QN AUTO: 28.1 PG (ref 26.1–32.9)
MCHC RBC AUTO-ENTMCNC: 32.1 G/DL (ref 31.4–35)
MCV RBC AUTO: 87.4 FL (ref 79.6–97.8)
MONOCYTES # BLD: 1.5 K/UL (ref 0.1–1.3)
MONOCYTES NFR BLD: 11 % (ref 4–12)
NEUTS SEG # BLD: 10.1 K/UL (ref 1.7–8.2)
NEUTS SEG NFR BLD: 75 % (ref 43–78)
NRBC # BLD: 0 K/UL (ref 0–0.2)
PLATELET # BLD AUTO: 643 K/UL (ref 150–450)
PMV BLD AUTO: 9.3 FL (ref 9.4–12.3)
POTASSIUM SERPL-SCNC: 4.5 MMOL/L (ref 3.5–5.1)
PROT SERPL-MCNC: 7.4 G/DL (ref 6.3–8.2)
RBC # BLD AUTO: 5.88 M/UL (ref 4.23–5.6)
SODIUM SERPL-SCNC: 140 MMOL/L (ref 136–145)
WBC # BLD AUTO: 13.4 K/UL (ref 4.3–11.1)

## 2021-10-01 PROCEDURE — 74011250636 HC RX REV CODE- 250/636: Performed by: HOSPITALIST

## 2021-10-01 PROCEDURE — 74011250637 HC RX REV CODE- 250/637: Performed by: NURSE PRACTITIONER

## 2021-10-01 PROCEDURE — 74011250636 HC RX REV CODE- 250/636: Performed by: INTERNAL MEDICINE

## 2021-10-01 PROCEDURE — 36415 COLL VENOUS BLD VENIPUNCTURE: CPT

## 2021-10-01 PROCEDURE — 80048 BASIC METABOLIC PNL TOTAL CA: CPT

## 2021-10-01 PROCEDURE — 65270000029 HC RM PRIVATE

## 2021-10-01 PROCEDURE — 77010033678 HC OXYGEN DAILY

## 2021-10-01 PROCEDURE — 94760 N-INVAS EAR/PLS OXIMETRY 1: CPT

## 2021-10-01 PROCEDURE — 85025 COMPLETE CBC W/AUTO DIFF WBC: CPT

## 2021-10-01 PROCEDURE — 74011250636 HC RX REV CODE- 250/636: Performed by: STUDENT IN AN ORGANIZED HEALTH CARE EDUCATION/TRAINING PROGRAM

## 2021-10-01 PROCEDURE — 74011250637 HC RX REV CODE- 250/637: Performed by: FAMILY MEDICINE

## 2021-10-01 PROCEDURE — 80076 HEPATIC FUNCTION PANEL: CPT

## 2021-10-01 PROCEDURE — 85379 FIBRIN DEGRADATION QUANT: CPT

## 2021-10-01 PROCEDURE — 94640 AIRWAY INHALATION TREATMENT: CPT

## 2021-10-01 PROCEDURE — 83605 ASSAY OF LACTIC ACID: CPT

## 2021-10-01 RX ADMIN — Medication 250 MG: at 08:09

## 2021-10-01 RX ADMIN — CLONAZEPAM 0.5 MG: 0.5 TABLET ORAL at 15:16

## 2021-10-01 RX ADMIN — TRAZODONE HYDROCHLORIDE 50 MG: 50 TABLET ORAL at 21:22

## 2021-10-01 RX ADMIN — BARICITINIB 4 MG: 2 TABLET, FILM COATED ORAL at 08:09

## 2021-10-01 RX ADMIN — Medication 10 ML: at 05:25

## 2021-10-01 RX ADMIN — SODIUM CHLORIDE 1000 ML: 900 INJECTION, SOLUTION INTRAVENOUS at 19:50

## 2021-10-01 RX ADMIN — DEXAMETHASONE 6 MG: 4 TABLET ORAL at 08:09

## 2021-10-01 RX ADMIN — Medication 250 MG: at 17:33

## 2021-10-01 RX ADMIN — Medication 10 ML: at 21:23

## 2021-10-01 RX ADMIN — BUDESONIDE AND FORMOTEROL FUMARATE DIHYDRATE 2 PUFF: 80; 4.5 AEROSOL RESPIRATORY (INHALATION) at 10:40

## 2021-10-01 RX ADMIN — ENOXAPARIN SODIUM 40 MG: 40 INJECTION SUBCUTANEOUS at 08:09

## 2021-10-01 RX ADMIN — BUDESONIDE AND FORMOTEROL FUMARATE DIHYDRATE 2 PUFF: 80; 4.5 AEROSOL RESPIRATORY (INHALATION) at 22:00

## 2021-10-01 RX ADMIN — Medication 5 ML: at 13:33

## 2021-10-01 NOTE — PROGRESS NOTES
Pt noted to be tachycardic this morning. -120 at rest, up to 160 with activity per telemonitor room. Pt asymptomatic, BP stable. Zoe Contreras NP notified vie [employer].

## 2021-10-01 NOTE — RT PROTOCOL NOTE
Respiratory Care Services     Policy Number: [id number]    Title: Oxygen Protocol    Effective Date: 01/1996    Revised Date: 06/2013, 02/29/2016, 4/2018, 7/2019    Reviewed Date: 05/2014, 03/2015, 06/2017, 11/2020        I. Policy: The Oxygen Protocol will be initiated for all patients upon written order from physician for administration of oxygen therapy or if a patient is found to have an oxygen saturation of 88% or less. Special consideration: the goal of oxygen therapy for COPD patients is to maintain oxygen saturation between 88% - 92% to comply with GOLD Guidelines. II. Purpose: To provide protocol driven respiratory therapy for the administration of oxygen at concentrations greater than that in ambient air with the intent of treating or preventing the symptoms and manifestations of hypoxia. III. Responsibility: Director Respiratory Care Services, all Respiratory Care Practitioners     IV. Indications:   A. Implement this protocol for patients when physician orders oxygen to be administered or when patient is found to have an oxygen saturation of 88% or less. B. To assure routine monitoring of patient's oxygen saturation b.i.d. and to make appropriate adjustments in accordance with ordered oxygen saturation parameters. C. To assure continuity of respiratory care that meets [facility] Clinical Practice Guidelines and GOLD Guidelines. D. Hb < 8  E. Sickle Cell anemia crisis    V. Assessment:  Assess the following parameters to determine the need to adjust oxygen:  A. Measurement of patient's oxygen saturation via pulse oximetry. B. Observation of patient's color, respiratory effort, and responsiveness. C. Measurement of heart rate and respiratory rate. D. Complete a three-step ambulatory oxygen saturation when ordered. VI. Initiation:  Upon receipt of an order for oxygen, the RCP will:   A.  Verify order in the patient's EMR, which should include the desired oxygen saturation to be maintained. B. The patient shall be placed on oxygen with humidity (except for those oxygen delivery devices that do not require humidity, i.e. venturi masks and non-rebreather masks) as ordered by the physician to achieve the prescribed oxygen saturation. C. In the event that no saturation is specified, a saturation of 90% will be maintained. D. Patients, who are found to have a SaO2 of 88% or less, may be started on supplemental oxygen as described above. E. Patients admitted with cardiac problems/disease shall be maintained at 92% per Chest Committee recommendation. F. The patient will be informed of the \"no smoking policy\" and instructed in the proper use of oxygen therapy. G. Once desired oxygen saturation has been achieved, the RCP will document FIO2 and oxygen saturation in the respiratory section of the patient's EMR. VII. Maintenance:   A. 30-second oxygen saturation check will be taken to maintain the saturation ordered by the physician each day. B. Patients will be assessed each shift and as needed by pulse oximetry to determine if oxygen needs to be decreased, increased or discontinued. C. If changes in FIO2 are indicated, all changes will be documented in the respiratory section of the patient's EMR. D. If no changes in FIO2 are required, the patient's oxygen flow rate and saturation will be recorded in the respiratory section of the patient's EMR. E. Per [facility], patients who are receiving oxygen therapy but are not on oxygen at home, should be weaned off oxygen as soon as possible or when anticipated discharge becomes evident. Rosilyn Pickens will be discontinued after oxygen saturation has been maintained for 24 hours on room air and documented in the patient's EMR. G. Patients on the Inpatient Rehabilitation area on 9th floor will be exempt from having their oxygen discontinued per protocol.  Oxygen may be weaned but will be changed to prn to meet the needs of the patient when exercising and participating in therapy. H. The goal of oxygen therapy is to maintain patients with a diagnosis of COPD at oxygen saturation between 88% - 92% to comply with GOLD Guidelines. VIII. Safety: RCP will address the following safety issues:  A. Identify patient using the two patient identifiers name and birth date via ID bracelet. B. Perform hand hygiene per hospital policy utilizing Standard Precautions for all patients and following transmission-based isolation as indicated per hospital policy. C. Cardiac patients will be maintained at an oxygen saturation of 92%. D. If a patient's FIO2 requirements necessitate changing oxygen delivery devices to a high concentration of oxygen, documentation indicating the change must be included in the progress notes, as well as in the respiratory flowsheet. E. If a patient has a hemoglobin level <8 mg. RCP will consult physician before discontinuing oxygen. IX. Interventions:   A. RCP will assess patient for signs of respiratory distress or suspicion of CO2 retention. B. An ABG may be obtained for patients exhibiting respiratory distress or sickle cell crisis. C. An order should be entered into patient's EMR for ABG under per protocol. X. Documentation  A. Document assessment findings in the respiratory section of the patient's EMR. B. Document changes in therapy per protocol in the respiratory orders section and in the care plan section of the patient's EMR. C. Document patient education in the patient education section of the patient's EMR. XI. Reportable Conditions:  Report to the physician immediately:  A. Acute changes in patient's respiratory status. B. An oxygen saturation <85%. C. A change in oxygen delivery device to provide a high concentration of oxygen. XII. Patient Instructions: Review with Patient  A. Purpose of oxygen therapy  B. Proper technique for using oxygen  C.  No smoking policy    Approval: Pulmonary Committee (1-25-96)  Revision: Chest Committee (4-28-05)    L - Respiratory Care Department Policy, Procedure and Protocol Guideline Manual, 1995,         THAO Abbott. L - Therapist Driven Respiratory Care Protocols - A Practitioner's Guide for Criteria-Based       Respiratory Care by Celestino Zapata M.D., and THAO Larsen RRT. L - The rationale for therapist-driven protocols: an update. Respiratory Care 1998.  - [facility] Clinical Practice Guidelines. Respiratory Care Services       Policy Number: [id number]    Title: Patient Care Assessment Protocol    Effective Date: 01/1999    Revised Date: 05/2014, 04/2018, 12/2018, 07/2019    Reviewed Date: 06/2013/ 03/2015, 03/2016, 06/2017, 11/2020        Overview  In an effort to improve quality and reduce costs of respiratory care at [facility], the Respiratory Department has developed a number of Patient Care Protocols. These protocols have been developed according to Rakesh 3 and are utilized for those patients who are ordered respiratory therapy using therapeutic indications and standardized approaches for accomplishing objectives. Patient Care Protocols are intended to improve care by:   Defining the indications and standards of care agreed upon by the Pulmonary Medicine and [address] [place] [address] of [facility].  Training respiratory care practitioners to apply those criteria to individual patients and modify therapy as indicated by the protocols.  Documenting the indication and care plan as part of the initial ordering process.  Tapering or discontinuing treatments once the indication for therapy changes. The Patient Care Protocols shall be universally applied throughout the hospital as determined by   the Pulmonary Medicine and [address] [place] [address].     Rationale for Patient Care Assessment Protocols:   Continuous Quality Improvement   Cost containment   Standardization of care   Enhanced continuity of care   Utilization review   Timely intervention    The following patient care assessment protocols have been developed:   Aerosolized Medication Protocol    Bronchial Hygiene Protocol    Oxygen Protocol   CVRU Fast Track Weaning Protocol    Asthma Treatment Protocol ER   Pediatric Asthma Treatment Protocol ER   Alpha-1 Antitrypsin Deficiency Protocol   Prone Positioning Protocol    COPD Protocol    Home Oxygen Assessment Protocol   Ventilator Weaning Protocol    Lung Volume Expansion Protocol    The Director of Adrian Camarillo oversees the Patient Care Assessment Program. The Respiratory Educator is responsible for protocol development and training. The Supervisor is responsible for implementation and  activities. Each patient with an order for respiratory treatments will receive an evaluation. Respiratory Care Practitioners (RCP's) will perform the evaluations. The same evaluation tool will be utilized for initial and follow-up assessments. If the patient does not meet criteria for ordered therapy, the therapy will be discontinued. If the patient demonstrates an adverse response to initially ordered therapy, the therapy will be discontinued and the physician will be contacted. Specific physician's orders that deviate from protocols and are deemed \"inappropriate\" or \"unsafe\" will be addressed with ordering physician and/or medical director as required. Respiratory Patient Care Assessment Protocols    I. Policy:   In an effort to provide quality patient care and effective utilization of services, physicians who order respiratory therapy will have their patients treated via the protocols established (see attached) Respiratory Care Practitioners (RCP's) will complete the initial assessment which will indicate patient needs,  the care plan developed and will performed within 24 hours of admission. Frequency of the therapy will be set according to the results of the respiratory therapy evaluation and frequency guidelines policy. Reassessment will be continued every 48 hours and more frequently as needed for the individual patient. II. Purpose:     F. To provide a process that will allow for ongoing assessment and care plan modification for patients receiving respiratory services based on both objective and or subjective patient responses to interventions. This process of protocol utilization will assist in patient care progression while eliminating the need for the physician to continually update respiratory therapy orders. G. To assure continuity of respiratory care that meets [facility] Clinical Practice Guidelines. III. Initiation:  Implement Respiratory Care Protocols for patients who are ordered by physician          to receive respiratory therapy procedures or for ventilator management. IV. Protocol:  E. Upon receiving an order for therapy the RCP will review the patient's EMR (electronic medical record) for all pertinent information includin. Physician's order for therapy  2. Patient history and physical examination  3. Physician progress notes  4. Diagnostic. X-rays, PFT's, arterial blood gases etc.  F. The RCP will perform a respiratory assessment in the following manner:  1. General observations: color, pattern and effort of breathing, chest expansion, (symmetrical and bilateral), level of consciousness and the ability to ambulate. 2. The RCP will assess patient's cough ability and determine if bronchial hygiene is needed. If patient is unable to produce sputum, at that time, the RCP should question the patient with regard to their sputum: production, color consistency, frequency and amount.   3. Auscultation: Using a stethoscope, the RCP will listen and note quality of breath sounds and presence or absence of adventitious breath sounds in all lung fields, both anteriorly and posteriorly. 4. Upon completing the EMR review and physical assessment, the RCP will document findings in the RT Assessment section of the EMR. The score level will be provided and will be used to determine the frequency of therapy. V. Indications:   A. Bronchial Hygiene Protocol indications:   1. Potential for or presence of atelectasis. 1. Need for hydration and removal of retained secretions. 1. Need for improvement of cough effectiveness. 1. Presence of conditions associated with disorder of pulmonary clearance:  a. Cystic fibrosis  b. Bronchiectasis  c. Neuromuscular disease  d. Obstructive lung diseases  e. Restrictive lung diseases   Aerosolized Medication(s) Protocol indications:Treatment of bronchospasm/wheezing  2. Improvement of mucociliary clearance  3. Treatment of stridor  4. History of Bronchiectasis, Asthma or COPD  C. Oxygen Therapy Protocol indications:   1. Documented hypoxemia  2. Severe trauma  3. Acute myocardial infarction  4. Short-term therapy (e.g. post anesthesia recovery)  D. CVRU Ventilator Weaning Protocol indications:  1. All mechanically ventilated surgical patients unless they have a no wean order. E. Asthma Treatment Protocol ER indications:  1. Patients 15years of age and older that have been triaged or diagnosed with   asthma exacerbation shall be indicated for the ER Asthma Treatment Protocol. A physician order will be required to initiate the protocol. F. Pediatric Asthma protocol in the ER indications:  1. Patients less than 15years old that have been triaged or diagnosed with asthma exacerbation shall be indicated for the Pediatric Asthma protocol. A physician order will be required to initiate the protocol. G. Alpha-1 Antitrypsin Deficiency Testing protocol indications:         1. Patients admitted and diagnosed with COPD. H. Prone Positioning Protocol indications:         1. Acute lung injury         2. Acute respiratory distress syndrome (ARDS)   I. Respiratory Care COPD Protocol indications:         1. History of COPD in patient's records         2. Smoking history   J. Home Oxygen Assessment Protocol indications:         1. Chronic lung disease         2. Cor pulmonale         3. Unable to wean to room air 48 hours prior to anticipated discharge. K.  Ventilator Weaning Protocol indications:         1. Patient's mechanically ventilated          2. Managed by intensivist  L. Lung Volume Expansion Protocol indications:        1. Any patient at risk for pulmonary complications. VI. Maintenance:    H. Timely patient assessment is an integral part of this protocol therefore the following will be applied:  1. All non- critical care patients will be evaluated upon receiving initial respiratory care orders within 24 hours and re-evaluated within 48 hours (or more as needed). 2. Orders requesting a Respiratory Consult will be responded to in the following manner:  a. In patient emergency situations, the RCP assigned to the floor will respond immediately to the patient, provide an initial respiratory assessment, and contact the patient's physician as necessary for appropriate orders. b. In non-emergent situations, the RCP assigned to the floor will respond to the patient within 90 minutes and provide an initial respiratory assessment and contact patient's physician as necessary for appropriate orders. c. An RCP will provide a comprehensive assessment as soon as possible. 3. Upon completion of an evaluation, the RCP will complete documentation in the patient's EMR in the RT Assessment section. 4. The RCP who completes the assessment will document orders for therapy in the orders section of the patient's EMR selecting new order. Next, per protocol should be selected indicating it is a protocol order and sign orders should be selected to complete the process.  The applicable protocol must be added to the progress note per Joint Commission guidelines. 5. The Pharmacy and Therapeutics (P&T) Committee has mandated that the medication Xopenex may be changed to unit dose albuterol without an order, except for those patients receiving Xopenex due to cardiac arrhythmias. 1. The dosage for these patients should be 0.63 mg. and may be changed from 1.25 mg. to 0.63 mg per P & T Committee by the RCP completing the assessment. 6. Patients who are not experiencing cardiac arrhythmias, and are ordered Xopenex and Atrovent may be changed to Duoneb. VII. Safety:        I. The following safety issues shall be monitored:  1. The RCP will perform hand hygiene per hospital policy utilizing Standard Precautions for all patients and following transmission-based isolation as indicated per hospital policy. 2. The RCP must exercise professional judgment in classifying the patient for frequency of therapy. 3. Appropriate classification of the patient will require an evaluation utilizing the Therapy Assessment Protocol Guidelines. 4. The RCP will confer with the physician concerning the care of the patient at any time questions or problems arise. 5. If during therapy, the patient exhibits no improvement, or deterioration in clinical status the RCP will notify the physician and the patient's nurse. VIII. Interventions:   F. The patient's nurse is responsible concerning all items related to his/her care. Ongoing communication with nursing is essential to successful protocol management. G. The RCP recognizes the value of the team approach in meeting the patient's needs. Nursing input regarding the patient's pulmonary condition will be sought as needed. IX. Reportable conditions: The RCP will inform the physician if:  1. There are acute changes in patient's respiratory status. 2. The therapist is unable to determine appropriate care plan upon assessment. 3. The patient fails to reach therapeutic objective.   4. A change or additional medication is needed. X.  Patient Education:    D. Patient will receive instruction on the followin. The treatment modality, including objectives and proper technique of therapy  2. Respiratory medications  E. Documentation shall occur in the patient education section of the patient's EMR. XI. Documentation: Record all findings as described above in the patient's EMR. Related Protocols: A. Aerosolized Medication Protocol  B. Bronchial Hygiene   C. Oxygen Protocol   D. [facility] Fast Track Weaning Protocol  E. Asthma Treatment protocol ER  F. Alpha-1 antitrypsin Deficiency Protocol  G. Prone Positioning Protocol  H. Respiratory Care COPD Protocol  I. Home Oxygen assessment Protocol  J. Ventilator Weaning Protocols   K. Volume Expansion protocol    Indications      Frequency          Level  A. Aerosol therapy   1.  q4h     Severe SOB, wheezing, unable to sleep 1   2.  qid, q4 wa or q6h   Moderate SOB, wheezing   2   3.  tid      Hx of asthma, or COPD mild wheezing,         or facilitate secretion removal              3   4.  bid      Asthma, or COPD, Intermittent wheezing 4   5. PRN, i.e. tid PRN, qid PRN Asthma, or COPD, occasional wheezing 5       B. Bronchopulmonary Hygiene    1. q4h             Copious secretions, SOB, unable to sleep 1   2. qid & PRN            Moderate amounts of secretions   2   3. tid           Small amounts of secretions and poor cough,               history of secretions    3    4. PRN, i.e. tid PRN, qid PRN     Breathing exercises, encourage cough only 4      C. Oxygen Therapy     Follow hospital approved Oxygen Protocol      Note:  qid treatments are due 0800, 1200, 1600, and 2000. tid treatments are due 0800, 1400, and 2000  Q6h treatments are due 0800, 1400, 2000, 0200  Q4 wa teatments are due 0800, 1200, 1600, and 2000. Q4h treatments are due  0800, 1200, 1600, 2000, 0000, and 0400.         The Level 1-5 rating system is only to be used as criteria for determining appropriate frequency of therapy. References:   N   Joint Commission Consolidated Liu Standard   L    Respiratory Care Department Policy, Procedure and Protocol Guideline Manual, 1995, THAO VILLANUEVA  Therapist Driven Respiratory Care Protocols - A Practitioner's Guide for Criteria-Based Respiratory Care by Eleanor Balderas M.D., and THAO Dinero RRT. L  The rationale for therapist-driven protocols: an update. Respiratory Care 1998; 99:374-318   L Therapist Driven Respiratory Care Protocols - A Practitioner's Guide for Criteria-Based Respiratory Care by Eleanor Balderas M.D., and RUSH Baird The rationale for therapist-driven protocols: an update. Respiratory Care 1998; [id number]. N   [facility] Clinical Practice Guidelines. D. The RCP will perform a respiratory assessment in the following manner:  1. Perform hand hygiene per hospital policy utilizing Standard Precautions for all patients and following transmission-based isolation as indicated per policy. 2. Identify patient via ID bracelet verifying patient name and birth date. 3. General observations: color, pattern and effort of breathing, chest expansion, (symmetrical and bilateral), level of consciousness and the ability to ambulate. 4. The RCP will assess patients cough ability and determine if Nasotracheal suctioning is needed. If patient is unable to produce sputum, at that time, the RCP should question the patient with regard to their sputum: production, color consistency, frequency and amount. 5. Auscultation: Using a stethoscope, the RCP will listen and note quality of breath sounds and presence or absence of adventitious breath sounds in all lung fields, both anteriorly and posteriorly. 6. Upon completing the EMR review and physical assessment, the RCP will document findings in the RT Assessment section of the EMR. The score level will be provided and will be used to determine the frequency of therapy. V. Indications:   A.   Indications for Bronchial Hygiene Protocol will include:  1. Potential for or presence of atelectasis. 2. Need for hydration and removal of retained secretions. 3. Need for improvement of cough effectiveness. 4. Presence of conditions associated with disorder of pulmonary clearance:  a. Cystic fibrosis  b. Bronchiectasis  B. Indications for Aerosolized Medication(s) Protocol should include:  1. Treatment of bronchospasm/wheezing  2. Improvement of mucociliary clearance  3. Treatment of stridor  4. History of Asthma or COPD             C.  Indications for Oxygen Therapy Protocol should include:  1. Documented hypoxemia  2. Severe trauma  3. Acute myocardial infarction  4. Short-term therapy (e.g. post anesthesia recovery)    VI. Maintenance:    D. Timely patient assessment is an integral part of this protocol therefore the following will be applied:  1. All non- critical care patients will be evaluated upon receiving initial respiratory care orders within 24 hours and re-evaluated within 48 hours (or more as needed). 2. Orders requesting a Respiratory Consult will be responded to in the following manner:  a. In patient emergency situations, the RCP assigned to the floor will respond immediately to the patient, provide an initial respiratory assessment, and contact the patients physician as necessary for appropriate orders. b. In non-emergent situations, the RCP assigned to the floor will respond to the patient within 90 minutes and provide an initial respiratory assessment and contact patients physician as necessary for appropriate orders. c. An RCP will provide a comprehensive assessment as soon as possible. 3. Upon completion of an evaluation, the RCP will complete documentation in the patients EMR in the RT Assessment section. 4. The RCP who completes the assessment will document orders for therapy in the orders section of the patients EMR selecting new order.  Next, per protocol should be selected indicating it is a protocol order and sign orders should be selected to complete the process. 5. The Pharmacy and Therapeutics (P&T) Committee has mandated that the medication Xopenex may be changed to unit dose albuterol without an order, except for those patients receiving Xopenex due to cardiac arrhythmias. The dosage for these patients should be 0.63 mg. and may be changed from 1.25 mg. to 0.63 mg per P & T Committee by the RCP completing the assessment. 6. Patients who are not experiencing cardiac arrhythmias, and are ordered Xopenex and Atrovent may be changed to Duoneb. VII. Safety:        A. The following safety issues shall be monitored:  1. The RCP will perform hand hygiene per hospital policy utilizing Standard Precautions for all patients and following transmission-based isolation as indicated per hospital policy. 2. The RCP must exercise professional judgment in classifying the patient for frequency of therapy. 3. Appropriate classification of the patient will require an evaluation utilizing the Therapy Assessment Protocol Guidelines. 4. The RCP will confer with the physician concerning the care of the patient at any time questions or problems arise. 5. If during therapy, the patient exhibits no improvement or deterioration in clinical status the RCP will notify the physician and the patients nurse. VIII. Interventions:   A. The patients nurse is responsible concerning all items related to his/her care. Ongoing communication with nursing is essential to successful protocol management. B. The RCP recognizes the value of the team approach in meeting the patients needs. Nursing input regarding the patients pulmonary condition will be sought as needed. IX. Reportable conditions: The RCP will inform the physician if:  1. There are acute changes in patients respiratory status. 2. The therapist is unable to determine appropriate care plan upon assessment.   3. The patient fails to reach therapeutic objective. 4. A change or additional medication is needed. X.  Patient Education:    A. Patient will receive instruction on the followin. The treatment modality, including objectives and proper technique of therapy  2. Respiratory medications  B. Documentation shall occur in the patient education section of the patients EMR. XI. Documentation: Record all findings as described above in the patients EMR. Related Protocols: A. Aerosolized Medication Protocol  B. Bronchial Hygiene  C. Oxygen Protocol   D. Volume Expansion/Secretion Clearance  E. Ventilator Weaning Protocols    References:  N   [facility] Standard   L    Respiratory Care Department Policy, Procedure and Protocol Guideline Manual, , THAO Abbott. L  Therapist Driven Respiratory Care Protocols - A Practitioners Guide for Criteria-Based Respiratory Care by Yana Boykin M.D., and THAO Jasmine RRT. L  The rationale for therapist-driven protocols: an update. Respiratory Care 1998; [facility] Guidelines. Respiratory Care Services Policy Number: [id number]    Title: Aerosolized Medication Protocol    Effective Date: 10/1998    Revised Date: , , ,      Reviewed Date: / 03/15 , , , 2020   I. Policy: The Aerosolized Medication Protocol shall by implemented by Respiratory Care Practitioners (RCP) for patients with orders to receive aerosol therapy with medication. II. Purpose: To open and maintain obstructed airways, the RCP, will utilize the following   protocol to select the indicated aerosolized medication(s) and determine the most effective method of delivery to the patient. III. Patient Type: All patients who are determined to meet aerosolized medication criteria as          outlined in this protocol. IV.  Responsibility: Director, [address], registered Respiratory Care Practitioners (RCP's) with documented competency in the performance of                                     respiratory therapeutic techniques. V. Equipment needed:  H. Stethoscope  I. Pulse oximeter  J. AeroEclipse nebulizer  K. Meter Dose Inhaler (MDI)    VI. Protocol:   G. The following conditions are accepted indications for aerosolized medication therapy. 5. Bronchospasm/wheezing  6. Impaired mucociliary clearance  7. Tracheobronchial mucosal congestion/and laryngeal stridor  8. Diseases which commonly require aerosolized medication therapy include, but are not limited to:  f. Asthma/reactive airway disease  g. Bronchitis/emphysema (COPD)  h. Cystic fibrosis  i. Severe laryngitis/tracheitis  j. Bronchiectasis  k. Smoke inhalation or chemical trauma to the lung or upper airway  l. Physical trauma to the upper airway  m. Laryngotracheobronchitis  n. Bronchiolitis  o. Non-specific wheezing              B. Indications for bronchodilator medications will include:  d. Bronchospasm/ wheezing  e. Asthma/reactive airway disease  f. Chronic obstructive pulmonary disease  g. Obstructive defect on pulmonary function testing  C. Administration of medications  5. If a bronchodilator or any other type of respiratory medication is needed, a physician order must be indicated in the medication section in the patient's EMR. 6. When the physician specifies the medication and dosage at the time of request, the ordered medication will be used as part of the care plan. D. The following guidelines will be utilized in the evaluation and selection of the appropriate delivery device for indicated medication(s):  1. MDI is the preferred method of medication delivery via common canister. a. Patient should be alert/cooperative  b. Able to perform 3 second breath hold. c. Patient may be changed to self-administer as appropriate. d. Patients in isolation will be provided an individual inhaler.   2. Unassisted aerosol (UA) is indicated if  c. Ventilation is inadequate  d. Patient is unable to perform MDI appropriately     VII. Guidelines:   Monitor patient's vital signs and evaluate patient's clinical status. The need to change medication and/or modality may be indicated by:  5. A pulse greater than 120 bpm, or if a pulse increase of 20 bpm occurs with bronchodilator medications. 6. Significant worsening of dyspnea or wheezing occurring during or within 30 minutes of discontinuing therapy. 7. Worsening of patient's sensorium (e.g. patient becomes confused or obtunded, and unable to follow directions). 8. Worsening of patient's chest x-ray. 9. Change in sputum (e.g. increased pulmonary infiltrate, which might indicate need for volume expansion therapy). 10. Patient has difficulty coughing up secretions, which might indicate need for acetylcysteine and/or bronchial hygiene therapy. 11. Call physician immediately if dyspnea worsens and is not responsive to modifications allowed by protocol. VIII. Clinical Responsibility:  I. The therapy assessment guidelines will be used to evaluate all patients receiving aerosolized medications with the exception of critical care areas. 2. RCP's will perform changes in therapy per protocol. 2. It will be the responsibility of RCP to provide instruction regarding respiratory medications, possible side effects, aerosol therapy and proper MDI technique, as well as, spacer usage to patients ordered MDI therapy. 2. Current therapy that is part of a patient's home regimen will not be discontinued. a. Provide spacer and educate patient on proper inhaler technique if needed. IX. Documentation  D. Document assessment findings in the respiratory assessment section of the patient's EMR. E. Document changes in therapy per protocol in the respiratory orders section and in the care plan section of the patient's EMR. F. Document patient education in the patient education section of the patient's EMR.   X. Outcome Criteria:  J. Relief of wheezes and obstruction  K. Improved cough and sputum color and consistency  L. Improved chest x-ray  M. Improved arterial oxygen tension and or SaO2  N. Improved Peak Flow on asthmatic patients        XI. Related Policy/Protocols:  H. Respiratory Patient Care Protocols  I. Bronchial Hygiene Therapy  J. Oxygen Protocol  K. [facility] Administration of Metered Dose Inhalers via a Common Canister  L. [address] [place] [address] Generating Procedures  Reference:  L - Respiratory Care Department Policy, Procedure and Protocol Guideline Manual, 1995, THAO Abbott. L -  Therapist Driven Respiratory Care Protocols - A Practitioner's Guide for Criteria-Based Respiratory Care by Nirmal Hammonds M.D., and THAO Campbell, RAEGAN. L - The rationale for therapist-driven protocols: an update. Respiratory Care 1998; [id number]. N -[facility] Clinical Practice Guidelines. Respiratory Care Services Policy Number: [id number]    Title: Aerosolized Medication Protocol    Effective Date: 10/1998    Revised Date: 06/13, 03/16, 11/17, 07/19     Reviewed Date: 05/14/ 03/15 , 06/17, 5/18, 11/2020   I. Policy: The Aerosolized Medication Protocol shall by implemented by Respiratory Care Practitioners (RCP) for patients with orders to receive aerosol therapy with medication. II. Purpose: To open and maintain obstructed airways, the RCP, will utilize the following   protocol to select the indicated aerosolized medication(s) and determine the most effective method of delivery to the patient. III. Patient Type: All patients who are determined to meet aerosolized medication criteria as          outlined in this protocol. IV. Responsibility: Director, [address], registered Respiratory Care Practitioners (RCP's) with documented competency in the performance of                                     respiratory therapeutic techniques. V. Equipment needed:  L.  Stethoscope  M. Pulse oximeter  N. AeroEclipse nebulizer  O. Meter Dose Inhaler (MDI)    VI. Protocol:   H. The following conditions are accepted indications for aerosolized medication therapy. 9. Bronchospasm/wheezing  10. Impaired mucociliary clearance  11. Tracheobronchial mucosal congestion/and laryngeal stridor  12. Diseases which commonly require aerosolized medication therapy include, but are not limited to:  p. Asthma/reactive airway disease  q. Bronchitis/emphysema (COPD)  r. Cystic fibrosis  s. Severe laryngitis/tracheitis  t. Bronchiectasis  u. Smoke inhalation or chemical trauma to the lung or upper airway  v. Physical trauma to the upper airway  w. Laryngotracheobronchitis  x. Bronchiolitis  y. Non-specific wheezing              B. Indications for bronchodilator medications will include:  h. Bronchospasm/ wheezing  i. Asthma/reactive airway disease  j. Chronic obstructive pulmonary disease  k. Obstructive defect on pulmonary function testing  E. Administration of medications  7. If a bronchodilator or any other type of respiratory medication is needed, a physician order must be indicated in the medication section in the patient's EMR. 8. When the physician specifies the medication and dosage at the time of request, the ordered medication will be used as part of the care plan. F. The following guidelines will be utilized in the evaluation and selection of the appropriate delivery device for indicated medication(s):  3. MDI is the preferred method of medication delivery via common canister. a. Patient should be alert/cooperative  b. Able to perform 3 second breath hold. c. Patient may be changed to self-administer as appropriate. d. Patients in isolation will be provided an individual inhaler. 4. Unassisted aerosol (UA) is indicated if  e. Ventilation is inadequate  f. Patient is unable to perform MDI appropriately     VII. Guidelines:   Monitor patient's vital signs and evaluate patient's clinical status.  The need to change medication and/or modality may be indicated by:  12. A pulse greater than 120 bpm, or if a pulse increase of 20 bpm occurs with bronchodilator medications. 13. Significant worsening of dyspnea or wheezing occurring during or within 30 minutes of discontinuing therapy. 14. Worsening of patient's sensorium (e.g. patient becomes confused or obtunded, and unable to follow directions). 15. Worsening of patient's chest x-ray. 16. Change in sputum (e.g. increased pulmonary infiltrate, which might indicate need for volume expansion therapy). 17. Patient has difficulty coughing up secretions, which might indicate need for acetylcysteine and/or bronchial hygiene therapy. 18. Call physician immediately if dyspnea worsens and is not responsive to modifications allowed by protocol. VIII. Clinical Responsibility:  J. The therapy assessment guidelines will be used to evaluate all patients receiving aerosolized medications with the exception of critical care areas. 3. RCP's will perform changes in therapy per protocol. 3. It will be the responsibility of RCP to provide instruction regarding respiratory medications, possible side effects, aerosol therapy and proper MDI technique, as well as, spacer usage to patients ordered MDI therapy. 3. Current therapy that is part of a patient's home regimen will not be discontinued. a. Provide spacer and educate patient on proper inhaler technique if needed. IX. Documentation  G. Document assessment findings in the respiratory assessment section of the patient's EMR. H. Document changes in therapy per protocol in the respiratory orders section and in the care plan section of the patient's EMR. I. Document patient education in the patient education section of the patient's EMR. X. Outcome Criteria:  O. Relief of wheezes and obstruction  P. Improved cough and sputum color and consistency  Q. Improved chest x-ray  R.  Improved arterial oxygen tension and or SaO2  S. Improved Peak Flow on asthmatic patients        XI. Related Policy/Protocols:  N. Respiratory Patient Care Protocols  O. Bronchial Hygiene Therapy  P. Oxygen Protocol  Q. [address] Administration of Metered Dose Inhalers via a Common Canister  R. [address] [facility] [address] Aerosol Generating Procedures  Reference:  L - Respiratory Care Department Policy, Procedure and Protocol Guideline Manual, 1995, THAO Abbott. L -  Therapist Driven Respiratory Care Protocols - A Practitioner's Guide for Criteria-Based Respiratory Care by Jen Koehler M.D., and THAO [place] [facility], RRT. L - The rationale for therapist-driven protocols: an update. Respiratory Care 1998; [id number]. N -[facility] Clinical Practice Guidelines.

## 2021-10-01 NOTE — PROGRESS NOTES
Hospitalist Progress Note   Admit Date:  2021  6:44 PM   Name:  Chandra Roger. Age:  40 y.o. Sex:  male  :  1975   MRN:  [id number]   Room:  [facility]/    Presenting Complaint: No chief complaint on file. Reason(s) for Admission: Pneumonia due to COVID-19 virus [U07.1, J12.82]     Hospital Course & Interval History:   Sangeeta rPescott is a 40year old male with no significant PMGH who presented to the ER with a complaint of worsening SOB with associated cough. Noted to desat into the high 80s on RA. CXR showed COVID     Subjective (10/01/21):  Reports improvement in SOB but continues with RM with RR breaks and ADLs    Assessment & Plan:     Principal Problem:  Acute respiratory failure with hypoxia 2/2 Covid-19 Pneumonia, worsening  Symptom onset 5 days, tested positive , Unvaccinated. In ED he desatted to high 80's on RA. CXR shows covid PNA. CT chest negative for PE. Procal negative. pBNP negative. Dexamethasone for 10 days EOT 10/1/21  Received Remdesivir 2 doses prior to switching to Baricitinib on   Cont Baricitinib EOT 10/6  Cont IS and albuterol prn  Cont Symbicort  Encourage proning  CRP trending down. D-Dimer trending stable  CXR with stable findings  Cont [id number]. Wean O2 as tolerated. OOB in chair.   21 O2 decreased from 12L to 10L with sats remaining 93%; Monitor and continue weaning as able ; Tx as above  10/1/21 Now on 6L with sats of 92%; Wean as able   Leukocytosis, persistent  Sepsis 2/2 Covid infection  WBC 19K  with HR>100 with Covid-infection. Procal on  and  again was negative. Most likely due to covid-19 infection and steroids. BCX : NGTD  Monitor w/o abx for now pending stool studies   21 Patient clinically better; Likely reactive; patient has elevated neutrophils; procal consistently negative; Continue to monitor  10/1/21 Improved now 13.4 from 19.4;  Monitor    Diarrhea  Could be d/t Covid-infection  Add probiotics  Obtain stool cultures and C-Diff  Supportive care   9/30/21 No reported episodes today      Sinus Tachycardia  Monitor on telemetry   9/30/21 Resolved   10/1/21 With some tachycardia today; denies CP or increase in SOB; Ddimer improved from previous; Will repeat in a.m., TSH in a.m.  . If tachycardia persist consider further imaging        Difficulty sleeping  Cont melatonin nightly  Trazodone prn      Dispo/Discharge Planning:     Dispo pending     Diet:  ADULT DIET Regular; 3 carb choices (45 gm/meal)  ADULT ORAL NUTRITION SUPPLEMENT Dinner; Standard High Calorie/High Protein  DVT PPx: Lovenox SQ   Code status: Full Code    Hospital Problems as of 10/1/2021 Never Reviewed        Codes Class Noted - Resolved POA    Leukocytosis ICD-10-CM: D72.829  ICD-9-CM: 288.60  9/29/2021 - Present Unknown        Diarrhea ICD-10-CM: R19.7  ICD-9-CM: 787.91  9/29/2021 - Present Unknown        Tachycardia ICD-10-CM: R00.0  ICD-9-CM: 785.0  9/28/2021 - Present Unknown        Difficulty sleeping ICD-10-CM: G47.9  ICD-9-CM: 780.50  9/28/2021 - Present Unknown        Sepsis due to COVID-19 [facility]) ICD-10-CM: U07.1, A41.89  ICD-9-CM: 079.89, 995.91  9/27/2021 - Present Unknown        * (Principal) Pneumonia due to COVID-19 virus ICD-10-CM: U07.1, J12.82  ICD-9-CM: 480.8, 079.89  9/21/2021 - Present Unknown        Acute respiratory failure with hypoxia [facility]) ICD-10-CM: J96.01  ICD-9-CM: 518.81  9/20/2021 - Present Yes              Objective:     Patient Vitals for the past 24 hrs:   Temp Pulse Resp BP SpO2   10/01/21 1123 98 °F (36.7 °C) (!) 112 20 110/78 92 %   10/01/21 1040 -- -- -- -- 92 %   10/01/21 1030 -- (!) 129 -- -- --   10/01/21 0929 -- -- -- -- 96 %   10/01/21 0928 -- -- -- -- 97 %   10/01/21 0900 -- (!) 114 -- -- --   10/01/21 0847 -- -- -- -- 96 %   10/01/21 0749 98 °F (36.7 °C) 93 16 108/84 98 %   10/01/21 0340 97.9 °F (36.6 °C) 94 16 99/61 94 %   09/30/21 2322 97.7 °F (36.5 °C) 99 16 107/77 94 %   09/30/21 1948 -- -- -- -- 95 %   09/30/21 1941 98.1 °F (36.7 °C) (!) 114 20 119/74 96 %   09/30/21 1514 98.6 °F (37 °C) (!) 114 18 111/80 96 %     Oxygen Therapy  O2 Sat (%): 92 % (10/01/21 1123)  Pulse via Oximetry: 129 beats per minute (10/01/21 1040)  O2 Device: Hi flow nasal cannula (10/01/21 1123)  Skin Assessment: Clean, dry, & intact (09/25/21 0817)  O2 Flow Rate (L/min): 6 l/min (10/01/21 1123)  FIO2 (%): 94 % (09/29/21 0746)    Estimated body mass index is 30.42 kg/m² as calculated from the following:    Height as of this encounter: 5' 9\" (1.753 m). Weight as of this encounter: 93.4 kg (206 lb). Intake/Output Summary (Last 24 hours) at 10/1/2021 1204  Last data filed at 10/1/2021 0850  Gross per 24 hour   Intake 240 ml   Output --   Net 240 ml         Physical Exam:     General:    Well nourished. No overt distress  Head:  Normocephalic, atraumatic  Eyes:  Sclerae appear normal.  Pupils equally round. ENT:  Nares appear normal, no drainage. Moist oral mucosa  Neck:  No restricted ROM. Trachea midline   CV:   Tachy. No m/r/g. No jugular venous distension. Lungs:   CTAB. No wheezing, rhonchi, or rales. Respirations even, unlabored  Abdomen: Bowel sounds present. Soft, nontender, nondistended. Extremities: No cyanosis or clubbing. No edema  Skin:     No rashes and normal coloration. Warm and dry. Neuro:  Cranial nerves II-XII grossly intact. A&Ox3  Psych:  Normal mood and affect.       I have reviewed ordered lab tests and independently visualized imaging below:    Last 24hr Labs:  Recent Results (from the past 24 hour(s))   GLUCOSE, POC    Collection Time: 09/30/21  3:11 PM   Result Value Ref Range    Glucose (POC) 150 (H) 65 - 100 mg/dL    [facility]    CBC WITH AUTOMATED DIFF    Collection Time: 10/01/21  7:27 AM   Result Value Ref Range    WBC 13.4 (H) 4.3 - 11.1 K/uL    RBC 5.88 (H) 4.23 - 5.6 M/uL    HGB 16.5 13.6 - 17.2 g/dL    HCT 51.4 (H) 41.1 - 50.3 %    MCV 87.4 79.6 - 97.8 FL    MCH 28.1 26.1 - 32.9 PG    MCHC 32.1 31.4 - 35.0 g/dL    RDW 13.0 11.9 - 14.6 %    PLATELET 137 (H) 220 - 450 K/uL    MPV 9.3 (L) 9.4 - 12.3 FL    ABSOLUTE NRBC 0.00 0.0 - 0.2 K/uL    DF AUTOMATED      NEUTROPHILS 75 43 - 78 %    LYMPHOCYTES 11 (L) 13 - 44 %    MONOCYTES 11 4.0 - 12.0 %    EOSINOPHILS 1 0.5 - 7.8 %    BASOPHILS 0 0.0 - 2.0 %    IMMATURE GRANULOCYTES 1 0.0 - 5.0 %    ABS. NEUTROPHILS 10.1 (H) 1.7 - 8.2 K/UL    ABS. LYMPHOCYTES 1.5 0.5 - 4.6 K/UL    ABS. MONOCYTES 1.5 (H) 0.1 - 1.3 K/UL    ABS. EOSINOPHILS 0.2 0.0 - 0.8 K/UL    ABS. BASOPHILS 0.1 0.0 - 0.2 K/UL    ABS. IMM. GRANS. 0.1 0.0 - 0.5 K/UL   HEPATIC FUNCTION PANEL    Collection Time: 10/01/21  7:27 AM   Result Value Ref Range    Protein, total 7.4 6.3 - 8.2 g/dL    Albumin 3.2 (L) 3.5 - 5.0 g/dL    Globulin 4.2 (H) 2.3 - 3.5 g/dL    A-G Ratio 0.8 (L) 1.2 - 3.5      Bilirubin, total 0.7 0.2 - 1.1 MG/DL    Bilirubin, direct 0.2 <0.4 MG/DL    Alk.  phosphatase 102 50 - 136 U/L    AST (SGOT) 60 (H) 15 - 37 U/L    ALT (SGPT) 179 (H) 12 - 65 U/L   LACTIC ACID    Collection Time: 10/01/21  7:27 AM   Result Value Ref Range    Lactic acid 2.0 0.4 - 2.0 MMOL/L   METABOLIC PANEL, BASIC    Collection Time: 10/01/21  7:27 AM   Result Value Ref Range    Sodium 140 136 - 145 mmol/L    Potassium 4.5 3.5 - 5.1 mmol/L    Chloride 102 98 - 107 mmol/L    CO2 31 21 - 32 mmol/L    Anion gap 7 7 - 16 mmol/L    Glucose 88 65 - 100 mg/dL    BUN 16 6 - 23 MG/DL    Creatinine 0.96 0.8 - 1.5 MG/DL    GFR est AA >60 >60 ml/min/1.73m2    GFR est non-AA >60 >60 ml/min/1.73m2    Calcium 8.5 8.3 - 10.4 MG/DL   D DIMER    Collection Time: 10/01/21  7:27 AM   Result Value Ref Range    D DIMER 0.68 (H) <0.56 ug/ml(FEU)       All Micro Results     Procedure Component Value Units Date/Time    CULTURE, BLOOD [[id number]] Collected: 09/27/21 1617    Order Status: Completed Specimen: Blood Updated: 10/01/21 0708     Special Requests: --        RIGHT  Antecubital       Culture result: NO GROWTH 4 DAYS CULTURE, BLOOD [[id number]] Collected: 09/27/21 1617    Order Status: Completed Specimen: Blood Updated: 10/01/21 0708     Special Requests: --        LEFT  HAND       Culture result: NO GROWTH 4 DAYS       C. DIFFICILE AG & TOXIN A/B [[id number]]     Order Status: Canceled Specimen: Stool     CULTURE, STOOL [[id number]]     Order Status: Canceled Specimen: Stool     CULTURE, RESPIRATORY/SPUTUM/BRONCH Gogo Better [[id number]] Collected: 09/25/21 1715    Order Status: Canceled Specimen: Sputum           Other Studies:  No results found. Current Meds:  Current Facility-Administered Medications   Medication Dose Route Frequency    Saccharomyces boulardii (FLORASTOR) capsule 250 mg  250 mg Oral BID    melatonin tablet 5 mg  5 mg Oral QHS    traZODone (DESYREL) tablet 50 mg  50 mg Oral QHS PRN    sodium chloride (OCEAN) 0.65 % nasal squeeze bottle 2 Spray  2 Spray Both Nostrils Q2H PRN    guaiFENesin (ROBITUSSIN) 100 mg/5 mL oral liquid 100 mg  100 mg Oral Q4H PRN    budesonide-formoterol (SYMBICORT) 80-4.5 mcg inhaler  2 Puff Inhalation BID RT    albuterol (PROVENTIL HFA, VENTOLIN HFA, PROAIR HFA) inhaler 2 Puff  2 Puff Inhalation Q6H PRN    clonazePAM (KlonoPIN) tablet 0.5 mg  0.5 mg Oral BID PRN    baricitinib (OLUMIANT) tablet 4 mg  4 mg Oral DAILY    enoxaparin (LOVENOX) injection 40 mg  40 mg SubCUTAneous DAILY    sodium chloride (NS) flush 5-40 mL  5-40 mL IntraVENous Q8H    sodium chloride (NS) flush 5-40 mL  5-40 mL IntraVENous PRN    acetaminophen (TYLENOL) tablet 650 mg  650 mg Oral Q6H PRN    Or    acetaminophen (TYLENOL) suppository 650 mg  650 mg Rectal Q6H PRN    polyethylene glycol (MIRALAX) packet 17 g  17 g Oral DAILY PRN    ondansetron (ZOFRAN ODT) tablet 4 mg  4 mg Oral Q8H PRN    Or    ondansetron (ZOFRAN) injection 4 mg  4 mg IntraVENous Q6H PRN       Signed:  Cassandra Box NP    Part of this note may have been written by using a voice dictation software.   The note has been proof read but may still contain some grammatical/other typographical errors.

## 2021-10-01 NOTE — PROGRESS NOTES
Pt tachy with  Mews score 3. Dr Sonia Mcgee notified. No new orders at this time. Pt remains on remote telemetry monitor.

## 2021-10-02 ENCOUNTER — APPOINTMENT (OUTPATIENT)
Dept: GENERAL RADIOLOGY | Age: 46
DRG: 177 | End: 2021-10-02
Attending: FAMILY MEDICINE
Payer: COMMERCIAL

## 2021-10-02 LAB
BACTERIA SPEC CULT: NORMAL
BACTERIA SPEC CULT: NORMAL
CRP SERPL-MCNC: <0.3 MG/DL (ref 0–0.9)
D DIMER PPP FEU-MCNC: 0.45 UG/ML(FEU)
MAGNESIUM SERPL-MCNC: 2.3 MG/DL (ref 1.8–2.4)
PROCALCITONIN SERPL-MCNC: <0.05 NG/ML
SERVICE CMNT-IMP: NORMAL
SERVICE CMNT-IMP: NORMAL
TSH SERPL DL<=0.005 MIU/L-ACNC: 3.62 UIU/ML (ref 0.36–3.74)

## 2021-10-02 PROCEDURE — 85379 FIBRIN DEGRADATION QUANT: CPT

## 2021-10-02 PROCEDURE — 84443 ASSAY THYROID STIM HORMONE: CPT

## 2021-10-02 PROCEDURE — 83735 ASSAY OF MAGNESIUM: CPT

## 2021-10-02 PROCEDURE — 74011250636 HC RX REV CODE- 250/636: Performed by: STUDENT IN AN ORGANIZED HEALTH CARE EDUCATION/TRAINING PROGRAM

## 2021-10-02 PROCEDURE — 94760 N-INVAS EAR/PLS OXIMETRY 1: CPT

## 2021-10-02 PROCEDURE — 71045 X-RAY EXAM CHEST 1 VIEW: CPT

## 2021-10-02 PROCEDURE — 86140 C-REACTIVE PROTEIN: CPT

## 2021-10-02 PROCEDURE — 77010033678 HC OXYGEN DAILY

## 2021-10-02 PROCEDURE — 94761 N-INVAS EAR/PLS OXIMETRY MLT: CPT

## 2021-10-02 PROCEDURE — 36415 COLL VENOUS BLD VENIPUNCTURE: CPT

## 2021-10-02 PROCEDURE — 84145 PROCALCITONIN (PCT): CPT

## 2021-10-02 PROCEDURE — 74011250637 HC RX REV CODE- 250/637: Performed by: NURSE PRACTITIONER

## 2021-10-02 PROCEDURE — 74011250637 HC RX REV CODE- 250/637: Performed by: FAMILY MEDICINE

## 2021-10-02 PROCEDURE — 65270000029 HC RM PRIVATE

## 2021-10-02 PROCEDURE — 93005 ELECTROCARDIOGRAM TRACING: CPT | Performed by: PHYSICIAN ASSISTANT

## 2021-10-02 PROCEDURE — 74011250637 HC RX REV CODE- 250/637: Performed by: HOSPITALIST

## 2021-10-02 PROCEDURE — 94640 AIRWAY INHALATION TREATMENT: CPT

## 2021-10-02 PROCEDURE — 74011250636 HC RX REV CODE- 250/636: Performed by: PHYSICIAN ASSISTANT

## 2021-10-02 RX ORDER — SODIUM CHLORIDE 9 MG/ML
75 INJECTION, SOLUTION INTRAVENOUS CONTINUOUS
Status: DISCONTINUED | OUTPATIENT
Start: 2021-10-02 | End: 2021-10-04

## 2021-10-02 RX ADMIN — BUDESONIDE AND FORMOTEROL FUMARATE DIHYDRATE 2 PUFF: 80; 4.5 AEROSOL RESPIRATORY (INHALATION) at 08:31

## 2021-10-02 RX ADMIN — CLONAZEPAM 0.5 MG: 0.5 TABLET ORAL at 15:12

## 2021-10-02 RX ADMIN — ENOXAPARIN SODIUM 40 MG: 40 INJECTION SUBCUTANEOUS at 09:18

## 2021-10-02 RX ADMIN — Medication 250 MG: at 16:55

## 2021-10-02 RX ADMIN — BARICITINIB 4 MG: 2 TABLET, FILM COATED ORAL at 09:19

## 2021-10-02 RX ADMIN — SODIUM CHLORIDE 75 ML/HR: 900 INJECTION, SOLUTION INTRAVENOUS at 13:21

## 2021-10-02 RX ADMIN — ACETAMINOPHEN 650 MG: 325 TABLET ORAL at 10:59

## 2021-10-02 RX ADMIN — Medication 10 ML: at 13:21

## 2021-10-02 RX ADMIN — TRAZODONE HYDROCHLORIDE 50 MG: 50 TABLET ORAL at 21:37

## 2021-10-02 RX ADMIN — Medication 5 MG: at 21:37

## 2021-10-02 RX ADMIN — Medication 250 MG: at 09:19

## 2021-10-02 RX ADMIN — BUDESONIDE AND FORMOTEROL FUMARATE DIHYDRATE 2 PUFF: 80; 4.5 AEROSOL RESPIRATORY (INHALATION) at 20:00

## 2021-10-02 RX ADMIN — Medication 10 ML: at 06:18

## 2021-10-02 NOTE — PROGRESS NOTES
Pt had uneventful night. Pt remained on 5 lpm nc. Pt slept alternating side positioning. Currently awake and relaxed.

## 2021-10-02 NOTE — PROGRESS NOTES
PA aware of pt BP of 90\59 new orders received for IV fluids. Pt is asymptomatic at current time will continue to monitor.

## 2021-10-02 NOTE — PROGRESS NOTES
10/02/21 0831   Oxygen Therapy   O2 Sat (%) 96 %   Pulse via Oximetry 119 beats per minute   O2 Device Nasal cannula   O2 Flow Rate (L/min) 5 l/min  (weaned to 4L)

## 2021-10-02 NOTE — PROGRESS NOTES
Hospitalist Progress Note   Admit Date:  2021  6:44 PM   Name:  Galo Muñiz. Age:  40 y.o. Sex:  male  :  1975   MRN:  [id number]   Room:  [facility]/    Presenting Complaint: No chief complaint on file. Reason(s) for Admission: Pneumonia due to COVID-19 virus [U07.1, J12.82]     Hospital Course & Interval History:   Renetta Lovett is a 40year old male with no significant PMGH who presented to the ER with a complaint of worsening SOB with associated cough. Noted to desat into the high 80s on RA. CXR showed COVID     Subjective (10/02/21):  \"I would really love to go home if possible. \"  Pleasant 46y. o. WM sitting up in bed without complaints.       Assessment & Plan:     Acute respiratory failure with hypoxia 2/2 Covid-19 Pneumonia, worsening  - positive test 2021, CT chest negative for PE  - s/p baricitinib, finishes decadron course today  - tolerating supplemental O2 wean from 12L via NC to currently 4L hiflow  - check ambulatory O2 saturations today to determine potential home O2 needs    Sinus Tachycardia  - gentle IVF  - monitor; pt denies cardiac symptoms  - d-dimer wnl  - would not start BB given low-normotensive BP readings    Thrombocytosis  - attributed to acute COVID viral infection  - pt remains afebrile, not on abx  - repeat cxray, check procal / crp    Diarrhea  - resolved      Insomnia  - cont melatonin qhs and prn trazodone      Dispo/Discharge Planning:    - home once medically stable    Diet:  ADULT DIET Regular; 3 carb choices (45 gm/meal)  ADULT ORAL NUTRITION SUPPLEMENT Dinner; Standard High Calorie/High Protein  DVT PPx: Lovenox SQ   Code status: Full Code    Hospital Problems as of 10/2/2021 Never Reviewed        Codes Class Noted - Resolved POA    Leukocytosis ICD-10-CM: D08.001  ICD-9-CM: 288.60  2021 - Present Unknown        Diarrhea ICD-10-CM: R19.7  ICD-9-CM: 787.91  2021 - Present Unknown        Tachycardia ICD-10-CM: R00.0  ICD-9-CM: 785.0  2021 - Present Unknown        Difficulty sleeping ICD-10-CM: G47.9  ICD-9-CM: 780.50  9/28/2021 - Present Unknown        Sepsis due to COVID-19 [facility]) ICD-10-CM: U07.1, A41.89  ICD-9-CM: 079.89, 995.91  9/27/2021 - Present Unknown        * (Principal) Pneumonia due to COVID-19 virus ICD-10-CM: U07.1, J12.82  ICD-9-CM: 480.8, 079.89  9/21/2021 - Present Unknown        Acute respiratory failure with hypoxia [facility]) ICD-10-CM: J96.01  ICD-9-CM: 518.81  9/20/2021 - Present Yes              Objective:     Patient Vitals for the past 24 hrs:   Temp Pulse Resp BP SpO2   10/02/21 1124 98.4 °F (36.9 °C) (!) 120 20 (!) 90/59 96 %   10/02/21 0831 -- -- -- -- 96 %   10/02/21 0808 98.4 °F (36.9 °C) (!) 113 20 95/65 95 %   10/02/21 0243 97.9 °F (36.6 °C) (!) 101 20 106/73 94 %   10/01/21 2243 98 °F (36.7 °C) (!) 106 20 111/82 94 %   10/01/21 2200 -- -- -- -- 94 %   10/01/21 1918 98.5 °F (36.9 °C) (!) 131 20 94/63 93 %   10/01/21 1554 97.9 °F (36.6 °C) (!) 120 20 110/78 96 %   10/01/21 1515 -- -- -- -- 96 %     Oxygen Therapy  O2 Sat (%): 96 % (10/02/21 1124)  Pulse via Oximetry: 119 beats per minute (10/02/21 0831)  O2 Device: Nasal cannula (10/02/21 0831)  Skin Assessment: Clean, dry, & intact (09/25/21 0817)  O2 Flow Rate (L/min): 4 l/min (10/02/21 0832)  FIO2 (%): 40 % (10/01/21 2200)    Estimated body mass index is 30.66 kg/m² as calculated from the following:    Height as of this encounter: 5' 9\" (1.753 m). Weight as of this encounter: 94.2 kg (207 lb 9.6 oz). Intake/Output Summary (Last 24 hours) at 10/2/2021 1242  Last data filed at 10/1/2021 1930  Gross per 24 hour   Intake 760 ml   Output --   Net 760 ml         Physical Exam:     General:    Well nourished. No overt distress  Head:  Normocephalic, atraumatic  Eyes:  Sclerae appear normal.  Pupils equally round. ENT:  Nares appear normal, no drainage. Moist oral mucosa  Neck:  No restricted ROM. Trachea midline   CV:   Mild tachycardia. No m/r/g.   No jugular venous distension. Lungs:   CTAB. No wheezing, rhonchi, or rales. Respirations even, unlabored  Abdomen: Bowel sounds present. Soft, nontender, nondistended. Extremities: No cyanosis or clubbing. No edema  Skin:     No rashes and normal coloration. Warm and dry. Neuro:  Cranial nerves II-XII grossly intact. A&Ox3  Psych:  Normal mood and affect. I have reviewed ordered lab tests and independently visualized imaging below:    Last 24hr Labs:  Recent Results (from the past 24 hour(s))   D DIMER    Collection Time: 10/02/21  7:01 AM   Result Value Ref Range    D DIMER 0.45 <0.56 ug/ml(FEU)   TSH 3RD GENERATION    Collection Time: 10/02/21  7:01 AM   Result Value Ref Range    TSH 3.620 0.358 - 3.740 uIU/mL   MAGNESIUM    Collection Time: 10/02/21  7:01 AM   Result Value Ref Range    Magnesium 2.3 1.8 - 2.4 mg/dL       All Micro Results     Procedure Component Value Units Date/Time    CULTURE, BLOOD [[id number]] Collected: 09/27/21 1617    Order Status: Completed Specimen: Blood Updated: 10/02/21 1215     Special Requests: --        LEFT  HAND       Culture result: NO GROWTH 5 DAYS       CULTURE, BLOOD [[id number]] Collected: 09/27/21 1617    Order Status: Completed Specimen: Blood Updated: 10/02/21 1215     Special Requests: --        RIGHT  Antecubital       Culture result: NO GROWTH 5 DAYS       C. DIFFICILE AG & TOXIN A/B [[id number]]     Order Status: Canceled Specimen: Stool     CULTURE, STOOL [[id number]]     Order Status: Canceled Specimen: Stool     CULTURE, RESPIRATORY/SPUTUM/BRONCH Annye Parody [[id number]] Collected: 09/25/21 1715    Order Status: Canceled Specimen: Sputum           Other Studies:  No results found.     Current Meds:  Current Facility-Administered Medications   Medication Dose Route Frequency    0.9% sodium chloride infusion  75 mL/hr IntraVENous CONTINUOUS    Saccharomyces boulardii (FLORASTOR) capsule 250 mg  250 mg Oral BID    melatonin tablet 5 mg  5 mg Oral QHS    traZODone (DESYREL) tablet 50 mg  50 mg Oral QHS PRN    sodium chloride (OCEAN) 0.65 % nasal squeeze bottle 2 Spray  2 Spray Both Nostrils Q2H PRN    guaiFENesin (ROBITUSSIN) 100 mg/5 mL oral liquid 100 mg  100 mg Oral Q4H PRN    budesonide-formoterol (SYMBICORT) 80-4.5 mcg inhaler  2 Puff Inhalation BID RT    albuterol (PROVENTIL HFA, VENTOLIN HFA, PROAIR HFA) inhaler 2 Puff  2 Puff Inhalation Q6H PRN    clonazePAM (KlonoPIN) tablet 0.5 mg  0.5 mg Oral BID PRN    baricitinib (OLUMIANT) tablet 4 mg  4 mg Oral DAILY    enoxaparin (LOVENOX) injection 40 mg  40 mg SubCUTAneous DAILY    sodium chloride (NS) flush 5-40 mL  5-40 mL IntraVENous Q8H    sodium chloride (NS) flush 5-40 mL  5-40 mL IntraVENous PRN    acetaminophen (TYLENOL) tablet 650 mg  650 mg Oral Q6H PRN    Or    acetaminophen (TYLENOL) suppository 650 mg  650 mg Rectal Q6H PRN    polyethylene glycol (MIRALAX) packet 17 g  17 g Oral DAILY PRN    ondansetron (ZOFRAN ODT) tablet 4 mg  4 mg Oral Q8H PRN    Or    ondansetron (ZOFRAN) injection 4 mg  4 mg IntraVENous Q6H PRN       Signed:  RAYMOND Ndiaye    Part of this note may have been written by using a voice dictation software. The note has been proof read but may still contain some grammatical/other typographical errors.

## 2021-10-02 NOTE — PROGRESS NOTES
Pt in bed resting rr are even and unlabored lung sounds are diminished he is on 4L NC abd is soft bowel sounds are active. Pt denies pain and states he is ready for home. Pt does have increase in heart rate when he ambulates. He is stable at current time. Safety measures in place.

## 2021-10-02 NOTE — PROGRESS NOTES
Oxygen Qualifier       Room air: SpO2 with O2 and liter flow   Resting SpO2  92%  98% on 3L   Ambulating SpO2  88% 91% on 2L     Pt did well with ambulation. He required 2L of O2 to keep his sats above 90%. He was able to ambulate around the room on RA with sats in the mid 90s but would desat once sitting. His resting HR was in the 140s and walking remained in the 140-150s. Pt states he becomes anxious and has had issues with this in the past. He had an appointment scheduled with his PCP at beginning of week but had to cancel.     Completed by:    Governor Deborah PTA

## 2021-10-03 LAB
ALBUMIN SERPL-MCNC: 2.9 G/DL (ref 3.5–5)
ALBUMIN/GLOB SERPL: 0.8 {RATIO} (ref 1.2–3.5)
ALP SERPL-CCNC: 93 U/L (ref 50–136)
ALT SERPL-CCNC: 164 U/L (ref 12–65)
ANION GAP SERPL CALC-SCNC: 6 MMOL/L (ref 7–16)
AST SERPL-CCNC: 38 U/L (ref 15–37)
ATRIAL RATE: 137 BPM
BASOPHILS # BLD: 0.1 K/UL (ref 0–0.2)
BASOPHILS NFR BLD: 0 % (ref 0–2)
BILIRUB DIRECT SERPL-MCNC: 0.1 MG/DL
BILIRUB SERPL-MCNC: 0.4 MG/DL (ref 0.2–1.1)
BUN SERPL-MCNC: 9 MG/DL (ref 6–23)
CALCIUM SERPL-MCNC: 8.9 MG/DL (ref 8.3–10.4)
CALCULATED P AXIS, ECG09: 38 DEGREES
CALCULATED R AXIS, ECG10: -23 DEGREES
CALCULATED T AXIS, ECG11: 18 DEGREES
CHLORIDE SERPL-SCNC: 108 MMOL/L (ref 98–107)
CO2 SERPL-SCNC: 29 MMOL/L (ref 21–32)
CREAT SERPL-MCNC: 0.85 MG/DL (ref 0.8–1.5)
D DIMER PPP FEU-MCNC: 0.41 UG/ML(FEU)
DIAGNOSIS, 93000: NORMAL
DIFFERENTIAL METHOD BLD: ABNORMAL
EOSINOPHIL # BLD: 0.4 K/UL (ref 0–0.8)
EOSINOPHIL NFR BLD: 3 % (ref 0.5–7.8)
ERYTHROCYTE [DISTWIDTH] IN BLOOD BY AUTOMATED COUNT: 13.2 % (ref 11.9–14.6)
GLOBULIN SER CALC-MCNC: 3.6 G/DL (ref 2.3–3.5)
GLUCOSE SERPL-MCNC: 99 MG/DL (ref 65–100)
HCT VFR BLD AUTO: 47.3 % (ref 41.1–50.3)
HGB BLD-MCNC: 15.3 G/DL (ref 13.6–17.2)
IMM GRANULOCYTES # BLD AUTO: 0.1 K/UL (ref 0–0.5)
IMM GRANULOCYTES NFR BLD AUTO: 1 % (ref 0–5)
LYMPHOCYTES # BLD: 1.3 K/UL (ref 0.5–4.6)
LYMPHOCYTES NFR BLD: 9 % (ref 13–44)
MCH RBC QN AUTO: 28.2 PG (ref 26.1–32.9)
MCHC RBC AUTO-ENTMCNC: 32.3 G/DL (ref 31.4–35)
MCV RBC AUTO: 87.3 FL (ref 79.6–97.8)
MONOCYTES # BLD: 1.3 K/UL (ref 0.1–1.3)
MONOCYTES NFR BLD: 9 % (ref 4–12)
NEUTS SEG # BLD: 11.4 K/UL (ref 1.7–8.2)
NEUTS SEG NFR BLD: 79 % (ref 43–78)
NRBC # BLD: 0 K/UL (ref 0–0.2)
P-R INTERVAL, ECG05: 124 MS
PLATELET # BLD AUTO: 587 K/UL (ref 150–450)
PMV BLD AUTO: 9.2 FL (ref 9.4–12.3)
POTASSIUM SERPL-SCNC: 4.1 MMOL/L (ref 3.5–5.1)
PROT SERPL-MCNC: 6.5 G/DL (ref 6.3–8.2)
Q-T INTERVAL, ECG07: 294 MS
QRS DURATION, ECG06: 86 MS
QTC CALCULATION (BEZET), ECG08: 443 MS
RBC # BLD AUTO: 5.42 M/UL (ref 4.23–5.6)
SODIUM SERPL-SCNC: 143 MMOL/L (ref 136–145)
VENTRICULAR RATE, ECG03: 137 BPM
WBC # BLD AUTO: 14.5 K/UL (ref 4.3–11.1)

## 2021-10-03 PROCEDURE — 77010033678 HC OXYGEN DAILY

## 2021-10-03 PROCEDURE — 36415 COLL VENOUS BLD VENIPUNCTURE: CPT

## 2021-10-03 PROCEDURE — 74011250636 HC RX REV CODE- 250/636: Performed by: PHYSICIAN ASSISTANT

## 2021-10-03 PROCEDURE — 94761 N-INVAS EAR/PLS OXIMETRY MLT: CPT

## 2021-10-03 PROCEDURE — 80048 BASIC METABOLIC PNL TOTAL CA: CPT

## 2021-10-03 PROCEDURE — 74011000250 HC RX REV CODE- 250: Performed by: HOSPITALIST

## 2021-10-03 PROCEDURE — 74011250637 HC RX REV CODE- 250/637: Performed by: NURSE PRACTITIONER

## 2021-10-03 PROCEDURE — 85025 COMPLETE CBC W/AUTO DIFF WBC: CPT

## 2021-10-03 PROCEDURE — 80076 HEPATIC FUNCTION PANEL: CPT

## 2021-10-03 PROCEDURE — 94640 AIRWAY INHALATION TREATMENT: CPT

## 2021-10-03 PROCEDURE — 65270000029 HC RM PRIVATE

## 2021-10-03 PROCEDURE — 85379 FIBRIN DEGRADATION QUANT: CPT

## 2021-10-03 PROCEDURE — 74011250636 HC RX REV CODE- 250/636: Performed by: STUDENT IN AN ORGANIZED HEALTH CARE EDUCATION/TRAINING PROGRAM

## 2021-10-03 PROCEDURE — 74011250637 HC RX REV CODE- 250/637: Performed by: PHYSICIAN ASSISTANT

## 2021-10-03 PROCEDURE — 74011250637 HC RX REV CODE- 250/637: Performed by: INTERNAL MEDICINE

## 2021-10-03 PROCEDURE — 74011250637 HC RX REV CODE- 250/637: Performed by: FAMILY MEDICINE

## 2021-10-03 PROCEDURE — 74011250637 HC RX REV CODE- 250/637: Performed by: HOSPITALIST

## 2021-10-03 PROCEDURE — 94760 N-INVAS EAR/PLS OXIMETRY 1: CPT

## 2021-10-03 RX ORDER — METOPROLOL TARTRATE 25 MG/1
12.5 TABLET, FILM COATED ORAL 3 TIMES DAILY
Status: DISCONTINUED | OUTPATIENT
Start: 2021-10-03 | End: 2021-10-05

## 2021-10-03 RX ORDER — CARVEDILOL 3.12 MG/1
3.12 TABLET ORAL 2 TIMES DAILY WITH MEALS
Status: DISCONTINUED | OUTPATIENT
Start: 2021-10-03 | End: 2021-10-03

## 2021-10-03 RX ORDER — METOPROLOL TARTRATE 5 MG/5ML
5 INJECTION INTRAVENOUS ONCE
Status: COMPLETED | OUTPATIENT
Start: 2021-10-03 | End: 2021-10-03

## 2021-10-03 RX ADMIN — METOPROLOL TARTRATE 5 MG: 5 INJECTION INTRAVENOUS at 01:55

## 2021-10-03 RX ADMIN — Medication 250 MG: at 18:00

## 2021-10-03 RX ADMIN — Medication 10 ML: at 05:05

## 2021-10-03 RX ADMIN — ACETAMINOPHEN 650 MG: 325 TABLET ORAL at 15:22

## 2021-10-03 RX ADMIN — ENOXAPARIN SODIUM 40 MG: 40 INJECTION SUBCUTANEOUS at 08:22

## 2021-10-03 RX ADMIN — SODIUM CHLORIDE 75 ML/HR: 900 INJECTION, SOLUTION INTRAVENOUS at 16:54

## 2021-10-03 RX ADMIN — Medication 250 MG: at 08:22

## 2021-10-03 RX ADMIN — CLONAZEPAM 0.5 MG: 0.5 TABLET ORAL at 15:22

## 2021-10-03 RX ADMIN — BUDESONIDE AND FORMOTEROL FUMARATE DIHYDRATE 2 PUFF: 80; 4.5 AEROSOL RESPIRATORY (INHALATION) at 09:22

## 2021-10-03 RX ADMIN — METOPROLOL TARTRATE 12.5 MG: 25 TABLET, FILM COATED ORAL at 21:31

## 2021-10-03 RX ADMIN — CARVEDILOL 3.12 MG: 3.12 TABLET, FILM COATED ORAL at 09:36

## 2021-10-03 RX ADMIN — BUDESONIDE AND FORMOTEROL FUMARATE DIHYDRATE 2 PUFF: 80; 4.5 AEROSOL RESPIRATORY (INHALATION) at 19:47

## 2021-10-03 RX ADMIN — Medication 5 MG: at 21:15

## 2021-10-03 RX ADMIN — SODIUM CHLORIDE 75 ML/HR: 900 INJECTION, SOLUTION INTRAVENOUS at 02:16

## 2021-10-03 RX ADMIN — BARICITINIB 4 MG: 2 TABLET, FILM COATED ORAL at 08:22

## 2021-10-03 RX ADMIN — Medication 10 ML: at 21:15

## 2021-10-03 RX ADMIN — METOPROLOL TARTRATE 12.5 MG: 25 TABLET, FILM COATED ORAL at 14:55

## 2021-10-03 NOTE — PROGRESS NOTES
Patient heart rate from telemetry is 155 and this nurse checking heart rate and is 125 at this time.  Call light in reach

## 2021-10-03 NOTE — H&P
[facility] Cardiology Initial Cardiac Evaluation                 Date of  Admission: 9/21/2021  6:44 PM     Primary Care Physician: None- had pending appt   Primary Cardiologist: None  Referring Physician: Dr Betty Loza  Attending Physician: Dr Burk [place]    CC: theresa Salmeron is a 40 y.o. male admitted for Pneumonia due to COVID-19 virus [U07.1, J12.82]. He has no h/o CAD, CHF or arrhythmia. Smoked 1 ppd since age 16 until 2019 when he started vaping. No f/h of CAD. He developed cough and SOB and was admitted to [address] [place] [address] 9-20 w acute hypoxic respiratory failure due to Covid. His supplemental O2 needs have decreased. He has had persistent ST w HR , in the past 24 hours his HR has increased to 140-150. Pt has had palpitations for several years, PTA no CP, SOB, dizziness, syncope or LE edema. Palpitations occurred more w exertion at work that resolved w rest.  He had made an appt w a PCP at his daughter's request to be evaluated for palpitations. He has not noted more palpitations while in hospital and not more today. He notes palpitations only when he is getting up to go to the bathroom. WBC 14, hgb 15, d-dimer . 41, K 4.1, cr .85, mag 2.3, procal less than .05, crp less than . 3. Given po coreg and IV lopressor and HR has decreased to 100-110. COVID: Not vaccinated  Soc: Smoked 1 ppd since age 16 until 2019 when he started vaping. FH: No f/h of CAD    Patient Active Problem List   Diagnosis Code    Acute respiratory failure with hypoxia (HCC) J96.01    COVID-19 U07.1    Pneumonia due to COVID-19 virus U07.1, J12.82    Sepsis due to COVID-19 ([facility] [address] U07.1, A41.89    Tachycardia R00.0    Difficulty sleeping G47.9    Leukocytosis D72.829    Diarrhea R19.7       No past medical history on file. Past Surgical History:   Procedure Laterality Date    HX APPENDECTOMY       Allergies   Allergen Reactions    Pcn [Penicillins] Hives      No family history on file.    Social History Tobacco Use    Smoking status: Former Smoker    Smokeless tobacco: Current User    Tobacco comment: vapes   Substance Use Topics    Alcohol use: Not Currently        Current Facility-Administered Medications   Medication Dose Route Frequency    carvediloL (COREG) tablet 3.125 mg  3.125 mg Oral BID WITH MEALS    0.9% sodium chloride infusion  75 mL/hr IntraVENous CONTINUOUS    Saccharomyces boulardii (FLORASTOR) capsule 250 mg  250 mg Oral BID    melatonin tablet 5 mg  5 mg Oral QHS    traZODone (DESYREL) tablet 50 mg  50 mg Oral QHS PRN    sodium chloride (OCEAN) 0.65 % nasal squeeze bottle 2 Spray  2 Spray Both Nostrils Q2H PRN    guaiFENesin (ROBITUSSIN) 100 mg/5 mL oral liquid 100 mg  100 mg Oral Q4H PRN    budesonide-formoterol (SYMBICORT) 80-4.5 mcg inhaler  2 Puff Inhalation BID RT    albuterol (PROVENTIL HFA, VENTOLIN HFA, PROAIR HFA) inhaler 2 Puff  2 Puff Inhalation Q6H PRN    clonazePAM (KlonoPIN) tablet 0.5 mg  0.5 mg Oral BID PRN    baricitinib (OLUMIANT) tablet 4 mg  4 mg Oral DAILY    enoxaparin (LOVENOX) injection 40 mg  40 mg SubCUTAneous DAILY    sodium chloride (NS) flush 5-40 mL  5-40 mL IntraVENous Q8H    sodium chloride (NS) flush 5-40 mL  5-40 mL IntraVENous PRN    acetaminophen (TYLENOL) tablet 650 mg  650 mg Oral Q6H PRN    Or    acetaminophen (TYLENOL) suppository 650 mg  650 mg Rectal Q6H PRN    polyethylene glycol (MIRALAX) packet 17 g  17 g Oral DAILY PRN    ondansetron (ZOFRAN ODT) tablet 4 mg  4 mg Oral Q8H PRN    Or    ondansetron (ZOFRAN) injection 4 mg  4 mg IntraVENous Q6H PRN       Review of Symptoms:  General: no weight change,  + weakness, no fever or chills  Skin: no rashes, lumps, or other skin changes  HEENT: no headache, dizziness, lightheadedness, vision changes, hearing changes, tinnitus, vertigo, sinus pressure/pain, bleeding gums, sore throat, or hoarseness  Neck: no swollen glands, goiter, pain or stiffness  Respiratory: no cough, sputum, hemoptysis, no dyspnea, wheezing  Cardiovascular: + as per HPI  Gastrointestinal: no GERD, constipation, diarrhea, liver problems, or h/o GI bleed  Urinary: no frequency, urgency , hematuria, burning/pain with urination, recent flank pain, polyuria, nocturia, or difficulty urinating  Peripheral Vascular: no claudication, leg cramps, prior DVTs, swelling of calves, legs, or feet, color change, or swelling with redness or tenderness  Musculoskeletal: no muscle or joint pain/stiffness, joint swelling, erythema of joints, or back pain  Psychiatric: no depression or excessive stress  Neurological: no sensory or motor loss, seizures, syncope, tremors, numbness, no dementia  Hematologic: no anemia, easy bruising or bleeding  Endocrine: no thyroid problems, heat or cold intolerance, excessive sweating, polyuria, polydipsia, no  diabetes.        Physical Exam  Vitals:    10/03/21 0745 10/03/21 0922 10/03/21 0936 10/03/21 1115   BP: 93/66  107/70 105/60   Pulse: (!) 115  (!) 140 (!) 133   Resp: 18   20   Temp: 98.3 °F (36.8 °C)   97.5 °F (36.4 °C)   SpO2: 95% 93%  93%   Weight:       Height:           Physical Exam:  General: Well Developed, Well Nourished, No Acute Distress, RA  Head: normocephalic atraumatic   ENT: pupils equal and round, no abnormalities noted  Neck: supple, no JVD, no carotid bruits  Heart: S1S2 with RRR without murmurs w tachycardia   Lungs: Clear throughout auscultation bilaterally without adventitious sounds  Abd: soft, nontender, nondistended, with good bowel sounds  Ext: warm, no edema, calves supple/nontender, pulses 2+ bilaterally  Skin: warm and dry  Psychiatric: Normal mood and affect  Neurologic: Alert and oriented X 3      Cardiographics    Telemetry: ST w rate 110-120       Labs:   Recent Labs     10/03/21  0657 10/02/21  0701 10/01/21  0727     --  140   K 4.1  --  4.5   MG  --  2.3  --    BUN 9  --  16   CREA 0.85  --  0.96   GLU 99  --  88   WBC 14.5*  --  13.4*   HGB 15.3  --  16.5 HCT 47.3  --  51.4*   *  --  643*        Assessment/Plan:     Assessment:    Pneumonia due to COVID-19 virus (9/21/2021)- per primary     Acute respiratory failure with hypoxia ([address] (9/20/2021)- on RA    Sepsis due to COVID-19 [facility]) (9/27/2021)    Tachycardia (9/28/2021)- h/o palpations, now ST w rate 150 but not appearing to be more symptomatic, will start po lopressor, plan for echo and possibly monitor on d/c once covid resolved    Difficulty sleeping (9/28/2021)    Leukocytosis (9/29/2021)    Diarrhea (9/29/2021)- probiotic      Thank you very much for this referral. We appreciate the opportunity to participate in this patient's care. We will follow along with above stated plan. Mc Pitts MD: [facility] CARDIOLOGY     10/3/2021     1:44 PM    I have personally seen and examined Lina Ramsey. with Vidya ANDRADE. I agree and confirm findings in history, physical exam, and assessment/plan as outlined above with following pertinent additions/exceptions:      40year-old gentleman-admitted with COVID-19 infection/pneumonia/hypoxic respiratory failure-was on 15 L of oxygen by nasal cannula initially and has improved quite substantially and is down to just 3 L at this point. Had heart rates all the way as high as 140 bpm at rest but he does have baseline sinus tachycardia-likely inappropriate sinus tachycardia that he has dealt with all his life.  -On exam, he has no significant murmurs. No edema, few scattered rales that clear with coughing.  -Electrolytes appear stable. -Blood pressures are borderline low. He received 1 dose of 5 mg of IV metoprolol and his heart rate did improve and came down into the 1 teens range. He was started on low-dose carvedilol 3.125 mg twice daily but he may be better off on metoprolol tartrate as this will have less effect on his blood pressures.   We can start him on 12.5 mg 3 times a day and uptitrate as long as his blood pressures tolerate it.       Shae Fabian MD

## 2021-10-03 NOTE — PROGRESS NOTES
Hospitalist Progress Note   Admit Date:  2021  6:44 PM   Name:  Isac Segura. Age:  40 y.o. Sex:  male  :  1975   MRN:  [id number]   Room:  [facility]/    Presenting Complaint: No chief complaint on file. Reason(s) for Admission: Pneumonia due to COVID-19 virus [U07.1, J12.82]     Hospital Course & Interval History:   Daly Alba is a 40year old male with no significant PMGH who presented to the ER with a complaint of worsening SOB with associated cough. Noted to desat into the high 80s on RA. CXR showed COVID     Subjective (10/03/21): \"My heart rate has always been high ever since I was a kid; I've never seen the doctor for it though, it don't bother me. \" Pleasant 46y. o. WM laying in bed without complaints. Review of Systems:  10 systems reviewed and negative except as noted in HPI.       Assessment & Plan:     Acute respiratory failure with hypoxia 2/2 Covid-19 Pneumonia, worsening  - COVID+ 2021, CT chest negative for PE  - s/p baricitinib / decadron course  - down to 2-3L reg flow   - cxray yesterday with stable interstitial findings  - CRP / procal wnl    Sinus Tachycardia  - persistent tachycardia; TSH / D dimer normal  - cont IVF  - s/p 5mg lopressor IV yesterday  - start coreg 3.125 mg bid; EKG revealed sinus tachycardia  - monitor room called pt's HR 160s at rest (he is entirely asymptomatic); will consult cardiology    Mild hypotension  - suspect partially attributed to persistent tachycardia  - cont gentle IVF  - small dose BB started today  - monitor    Thrombocytosis  - attributed to acute COVID viral infection  - improved this AM    Diarrhea  - resolved      Unstable for dc at this time given tachycardia (HR goes up to 160s on exertion)     Dispo/Discharge Planning:    - home once medically stable    Diet:  ADULT DIET Regular; 3 carb choices (45 gm/meal)  ADULT ORAL NUTRITION SUPPLEMENT Dinner; Standard High Calorie/High Protein  DVT PPx: Lovenox SQ   Code status: Full Code    Hospital Problems as of 10/3/2021 Never Reviewed        Codes Class Noted - Resolved POA    Leukocytosis ICD-10-CM: D72.829  ICD-9-CM: 288.60  9/29/2021 - Present Unknown        Diarrhea ICD-10-CM: R19.7  ICD-9-CM: 787.91  9/29/2021 - Present Unknown        Tachycardia ICD-10-CM: R00.0  ICD-9-CM: 785.0  9/28/2021 - Present Unknown        Difficulty sleeping ICD-10-CM: G47.9  ICD-9-CM: 780.50  9/28/2021 - Present Unknown        Sepsis due to COVID-19 [facility]) ICD-10-CM: U07.1, A41.89  ICD-9-CM: 079.89, 995.91  9/27/2021 - Present Unknown        * (Principal) Pneumonia due to COVID-19 virus ICD-10-CM: U07.1, J12.82  ICD-9-CM: 480.8, 079.89  9/21/2021 - Present Unknown        Acute respiratory failure with hypoxia [facility]) ICD-10-CM: J96.01  ICD-9-CM: 518.81  9/20/2021 - Present Yes              Objective:     Patient Vitals for the past 24 hrs:   Temp Pulse Resp BP SpO2   10/03/21 0936 -- (!) 140 -- 107/70 --   10/03/21 0922 -- -- -- -- 93 %   10/03/21 0745 98.3 °F (36.8 °C) (!) 115 18 93/66 95 %   10/03/21 0434 98.1 °F (36.7 °C) (!) 110 20 106/70 92 %   10/03/21 0155 -- (!) 110 -- 112/72 --   10/02/21 2315 98.2 °F (36.8 °C) (!) 108 20 102/68 94 %   10/02/21 2200 -- (!) 125 -- -- --   10/02/21 2038 98.6 °F (37 °C) (!) 122 21 110/73 92 %   10/02/21 2020 -- -- -- -- 93 %   10/02/21 1541 98.4 °F (36.9 °C) (!) 120 20 (!) 99/54 96 %   10/02/21 1124 98.4 °F (36.9 °C) (!) 120 20 (!) 90/59 96 %     Oxygen Therapy  O2 Sat (%): 93 % (10/03/21 0922)  Pulse via Oximetry: 133 beats per minute (10/03/21 0922)  O2 Device: None (Room air) (10/03/21 5074)  Skin Assessment: Clean, dry, & intact (10/02/21 2020)  O2 Flow Rate (L/min): 0 l/min (10/03/21 0922)  FIO2 (%): 21 % (10/03/21 0922)    Estimated body mass index is 30.66 kg/m² as calculated from the following:    Height as of this encounter: 5' 9\" (1.753 m). Weight as of this encounter: 94.2 kg (207 lb 9.6 oz).     Intake/Output Summary (Last 24 hours) at 10/3/2021 1018  Last data filed at 10/3/2021 0907  Gross per 24 hour   Intake 290 ml   Output 450 ml   Net -160 ml         Physical Exam:     General:    Well nourished. No overt distress  Head:  Normocephalic, atraumatic  Eyes:  Sclerae appear normal.  Pupils equally round. ENT:  Nares appear normal, no drainage. Moist oral mucosa  Neck:  No restricted ROM. Trachea midline   CV:   Tachycardic rate, rhythm regular. No m/r/g. No jugular venous distension. Lungs:   CTAB. No wheezing, rhonchi, or rales. Respirations even, unlabored  Abdomen: Bowel sounds present. Soft, nontender, nondistended. Extremities: No cyanosis or clubbing. No edema  Skin:     No rashes and normal coloration. Warm and dry. Neuro:  Cranial nerves II-XII grossly intact. A&Ox3  Psych:  Normal mood and affect.       I have reviewed ordered lab tests and independently visualized imaging below:    Last 24hr Labs:  Recent Results (from the past 24 hour(s))   PROCALCITONIN    Collection Time: 10/02/21  2:21 PM   Result Value Ref Range    Procalcitonin <0.05 ng/mL   C REACTIVE PROTEIN, QT    Collection Time: 10/02/21  2:21 PM   Result Value Ref Range    C-Reactive protein <0.3 0.0 - 0.9 mg/dL   EKG, 12 LEAD, INITIAL    Collection Time: 10/02/21  4:40 PM   Result Value Ref Range    Ventricular Rate 137 BPM    Atrial Rate 137 BPM    P-R Interval 124 ms    QRS Duration 86 ms    Q-T Interval 294 ms    QTC Calculation (Bezet) 443 ms    Calculated P Axis 38 degrees    Calculated R Axis -23 degrees    Calculated T Axis 18 degrees    Diagnosis       Sinus tachycardia  Possible Anterior infarct , age undetermined  Abnormal ECG  No previous ECGs available     CBC WITH AUTOMATED DIFF    Collection Time: 10/03/21  6:57 AM   Result Value Ref Range    WBC 14.5 (H) 4.3 - 11.1 K/uL    RBC 5.42 4.23 - 5.6 M/uL    HGB 15.3 13.6 - 17.2 g/dL    HCT 47.3 41.1 - 50.3 %    MCV 87.3 79.6 - 97.8 FL    MCH 28.2 26.1 - 32.9 PG    MCHC 32.3 31.4 - 35.0 g/dL    RDW 13.2 11.9 - 14.6 %    PLATELET 279 (H) 322 - 450 K/uL    MPV 9.2 (L) 9.4 - 12.3 FL    ABSOLUTE NRBC 0.00 0.0 - 0.2 K/uL    DF AUTOMATED      NEUTROPHILS 79 (H) 43 - 78 %    LYMPHOCYTES 9 (L) 13 - 44 %    MONOCYTES 9 4.0 - 12.0 %    EOSINOPHILS 3 0.5 - 7.8 %    BASOPHILS 0 0.0 - 2.0 %    IMMATURE GRANULOCYTES 1 0.0 - 5.0 %    ABS. NEUTROPHILS 11.4 (H) 1.7 - 8.2 K/UL    ABS. LYMPHOCYTES 1.3 0.5 - 4.6 K/UL    ABS. MONOCYTES 1.3 0.1 - 1.3 K/UL    ABS. EOSINOPHILS 0.4 0.0 - 0.8 K/UL    ABS. BASOPHILS 0.1 0.0 - 0.2 K/UL    ABS. IMM. GRANS. 0.1 0.0 - 0.5 K/UL   HEPATIC FUNCTION PANEL    Collection Time: 10/03/21  6:57 AM   Result Value Ref Range    Protein, total 6.5 6.3 - 8.2 g/dL    Albumin 2.9 (L) 3.5 - 5.0 g/dL    Globulin 3.6 (H) 2.3 - 3.5 g/dL    A-G Ratio 0.8 (L) 1.2 - 3.5      Bilirubin, total 0.4 0.2 - 1.1 MG/DL    Bilirubin, direct 0.1 <0.4 MG/DL    Alk.  phosphatase 93 50 - 136 U/L    AST (SGOT) 38 (H) 15 - 37 U/L    ALT (SGPT) 164 (H) 12 - 65 U/L   METABOLIC PANEL, BASIC    Collection Time: 10/03/21  6:57 AM   Result Value Ref Range    Sodium 143 136 - 145 mmol/L    Potassium 4.1 3.5 - 5.1 mmol/L    Chloride 108 (H) 98 - 107 mmol/L    CO2 29 21 - 32 mmol/L    Anion gap 6 (L) 7 - 16 mmol/L    Glucose 99 65 - 100 mg/dL    BUN 9 6 - 23 MG/DL    Creatinine 0.85 0.8 - 1.5 MG/DL    GFR est AA >60 >60 ml/min/1.73m2    GFR est non-AA >60 >60 ml/min/1.73m2    Calcium 8.9 8.3 - 10.4 MG/DL   D DIMER    Collection Time: 10/03/21  6:57 AM   Result Value Ref Range    D DIMER 0.41 <0.56 ug/ml(FEU)       All Micro Results     Procedure Component Value Units Date/Time    CULTURE, BLOOD [[id number]] Collected: 09/27/21 1617    Order Status: Completed Specimen: Blood Updated: 10/02/21 1215     Special Requests: --        LEFT  HAND       Culture result: NO GROWTH 5 DAYS       CULTURE, BLOOD [[id number]] Collected: 09/27/21 1617    Order Status: Completed Specimen: Blood Updated: 10/02/21 1215     Special Requests: -- RIGHT  Antecubital       Culture result: NO GROWTH 5 DAYS       C. DIFFICILE AG & TOXIN A/B [[id number]]     Order Status: Canceled Specimen: Stool     CULTURE, STOOL [[id number]]     Order Status: Canceled Specimen: Stool     CULTURE, RESPIRATORY/SPUTUM/BRONCH Donzetta Krabbe [[id number]] Collected: 09/25/21 8943    Order Status: Canceled Specimen: Sputum           Other Studies:  XR CHEST SNGL V    Result Date: 10/2/2021  EXAM: XR CHEST SNGL V INDICATION: covid COMPARISON: 9/28/2021 FINDINGS: A portable AP radiograph of the chest was obtained at 1258 hours. The patient is on a cardiac monitor. Bilateral groundglass opacities and increased interstitial markings unchanged. The cardiac and mediastinal contours and pulmonary vascularity are normal.  The bones and soft tissues are grossly within normal limits. Bilateral Covid pneumonia unchanged.       Current Meds:  Current Facility-Administered Medications   Medication Dose Route Frequency    carvediloL (COREG) tablet 3.125 mg  3.125 mg Oral BID WITH MEALS    0.9% sodium chloride infusion  75 mL/hr IntraVENous CONTINUOUS    Saccharomyces boulardii (FLORASTOR) capsule 250 mg  250 mg Oral BID    melatonin tablet 5 mg  5 mg Oral QHS    traZODone (DESYREL) tablet 50 mg  50 mg Oral QHS PRN    sodium chloride (OCEAN) 0.65 % nasal squeeze bottle 2 Spray  2 Spray Both Nostrils Q2H PRN    guaiFENesin (ROBITUSSIN) 100 mg/5 mL oral liquid 100 mg  100 mg Oral Q4H PRN    budesonide-formoterol (SYMBICORT) 80-4.5 mcg inhaler  2 Puff Inhalation BID RT    albuterol (PROVENTIL HFA, VENTOLIN HFA, PROAIR HFA) inhaler 2 Puff  2 Puff Inhalation Q6H PRN    clonazePAM (KlonoPIN) tablet 0.5 mg  0.5 mg Oral BID PRN    baricitinib (OLUMIANT) tablet 4 mg  4 mg Oral DAILY    enoxaparin (LOVENOX) injection 40 mg  40 mg SubCUTAneous DAILY    sodium chloride (NS) flush 5-40 mL  5-40 mL IntraVENous Q8H    sodium chloride (NS) flush 5-40 mL  5-40 mL IntraVENous PRN    acetaminophen (TYLENOL) tablet 650 mg  650 mg Oral Q6H PRN    Or    acetaminophen (TYLENOL) suppository 650 mg  650 mg Rectal Q6H PRN    polyethylene glycol (MIRALAX) packet 17 g  17 g Oral DAILY PRN    ondansetron (ZOFRAN ODT) tablet 4 mg  4 mg Oral Q8H PRN    Or    ondansetron (ZOFRAN) injection 4 mg  4 mg IntraVENous Q6H PRN       Signed:  RAYMOND Liu    Part of this note may have been written by using a voice dictation software. The note has been proof read but may still contain some grammatical/other typographical errors.

## 2021-10-03 NOTE — PROGRESS NOTES
Oxygen Qualifier       Room air: SpO2 with O2 and liter flow   Resting SpO2 93%  96% 2 L   Ambulating SpO2 88% 88% 1 L  90% 2 L         Completed by:    Apryl Grimes, PTA

## 2021-10-03 NOTE — PROGRESS NOTES
Patient blood pressure is 112/72 and pulse rate is 110. This nurse gave patient metoprolol 5 mg IV per MD order for high pulse rate. Will assess effectiveness.   Call light in reach

## 2021-10-03 NOTE — PROGRESS NOTES
Assessment completed and patient resting in bed. Patient denies pain or distress at this time. 02 at  3 liters via NC with 02 saturation at 92%. Telemetry on and working with ST and pulse at 123. Safety measures in place with call light in reach.

## 2021-10-03 NOTE — PROGRESS NOTES
Patient resting in bed with no pain or distress noted. Telemetry on with ST and 108.  02 at 3 liters via NC. Patient up in recliner with call light in reach and will prepare bedside shift report for oncoming nurse.

## 2021-10-04 PROCEDURE — 74011250636 HC RX REV CODE- 250/636: Performed by: STUDENT IN AN ORGANIZED HEALTH CARE EDUCATION/TRAINING PROGRAM

## 2021-10-04 PROCEDURE — 94760 N-INVAS EAR/PLS OXIMETRY 1: CPT

## 2021-10-04 PROCEDURE — 77010033678 HC OXYGEN DAILY

## 2021-10-04 PROCEDURE — 99232 SBSQ HOSP IP/OBS MODERATE 35: CPT | Performed by: INTERNAL MEDICINE

## 2021-10-04 PROCEDURE — 74011250637 HC RX REV CODE- 250/637: Performed by: FAMILY MEDICINE

## 2021-10-04 PROCEDURE — 74011250637 HC RX REV CODE- 250/637: Performed by: NURSE PRACTITIONER

## 2021-10-04 PROCEDURE — 74011250637 HC RX REV CODE- 250/637: Performed by: INTERNAL MEDICINE

## 2021-10-04 PROCEDURE — 65270000029 HC RM PRIVATE

## 2021-10-04 PROCEDURE — 94640 AIRWAY INHALATION TREATMENT: CPT

## 2021-10-04 RX ADMIN — BARICITINIB 4 MG: 2 TABLET, FILM COATED ORAL at 09:29

## 2021-10-04 RX ADMIN — METOPROLOL TARTRATE 12.5 MG: 25 TABLET, FILM COATED ORAL at 21:05

## 2021-10-04 RX ADMIN — Medication 5 MG: at 21:06

## 2021-10-04 RX ADMIN — CLONAZEPAM 0.5 MG: 0.5 TABLET ORAL at 14:34

## 2021-10-04 RX ADMIN — Medication 10 ML: at 14:34

## 2021-10-04 RX ADMIN — Medication 10 ML: at 21:05

## 2021-10-04 RX ADMIN — Medication 10 ML: at 06:03

## 2021-10-04 RX ADMIN — ENOXAPARIN SODIUM 40 MG: 40 INJECTION SUBCUTANEOUS at 09:29

## 2021-10-04 RX ADMIN — METOPROLOL TARTRATE 12.5 MG: 25 TABLET, FILM COATED ORAL at 09:28

## 2021-10-04 RX ADMIN — Medication 250 MG: at 09:28

## 2021-10-04 RX ADMIN — Medication 250 MG: at 17:35

## 2021-10-04 RX ADMIN — TRAZODONE HYDROCHLORIDE 50 MG: 50 TABLET ORAL at 21:15

## 2021-10-04 RX ADMIN — BUDESONIDE AND FORMOTEROL FUMARATE DIHYDRATE 2 PUFF: 80; 4.5 AEROSOL RESPIRATORY (INHALATION) at 08:00

## 2021-10-04 RX ADMIN — BUDESONIDE AND FORMOTEROL FUMARATE DIHYDRATE 2 PUFF: 80; 4.5 AEROSOL RESPIRATORY (INHALATION) at 20:41

## 2021-10-04 RX ADMIN — METOPROLOL TARTRATE 12.5 MG: 25 TABLET, FILM COATED ORAL at 17:36

## 2021-10-04 NOTE — PROGRESS NOTES
Hospitalist Progress Note   Admit Date:  2021  6:44 PM   Name:  Shiloh Polanco. Age:  40 y.o. Sex:  male  :  1975   MRN:  [id number]   Room:  [facility]/    Presenting Complaint: No chief complaint on file. Reason(s) for Admission: Pneumonia due to COVID-19 virus [U07.1, J12.82]     Hospital Course & Interval History:   Chris Augustin is a 40year old male with no significant PMGH who presented to the ER with a complaint of worsening SOB with associated cough. Noted to desat into the high 80s on RA. CXR showed COVID     10/3/2021: persistent tachycardia, HR 160s at rest; although remained asymptomatic, cardiology consulted    Subjective (10/04/21):  \"I feel fine. \" Pleasant 46y. o. WM laying in bed without complaints, remains on 2L reg flow; denies chest pains / palpitation    Review of Systems:  10 systems reviewed and negative except as noted in HPI.       Assessment & Plan:     Acute respiratory failure with hypoxia 2/2 Covid-19 Pneumonia, worsening  - COVID+ 2021, CT chest negative for PE  - s/p baricitinib / decadron course  - remains stable on 2L via NC  - cxray 10/2/2021 with stable interstitial findings  - CRP / procal wnl    Sinus Tachycardia  - persistent tachycardia; TSH / D dimer normal  - appreciate cardiology's evaluation  - newly started on BB, monitor HR / BP  - DC IVF    Mild hypotension  - suspect partially attributed to persistent tachycardia  - dc IVF  - monitor closely with newly started BB    Thrombocytosis  - attributed to acute COVID viral infection  - trending down 10/3/2021; f/u AM CBC    Diarrhea  - resolved       Dispo/Discharge Planning:    - home once medically stable    Diet:  ADULT DIET Regular; 3 carb choices (45 gm/meal)  ADULT ORAL NUTRITION SUPPLEMENT Dinner; Standard High Calorie/High Protein  DVT PPx: Lovenox SQ   Code status: Full Code    Hospital Problems as of 10/4/2021 Never Reviewed        Codes Class Noted - Resolved POA    Leukocytosis ICD-10-CM: P53.558  ICD-9-CM: 288.60  9/29/2021 - Present Unknown        Diarrhea ICD-10-CM: R19.7  ICD-9-CM: 787.91  9/29/2021 - Present Unknown        Tachycardia ICD-10-CM: R00.0  ICD-9-CM: 785.0  9/28/2021 - Present Unknown        Difficulty sleeping ICD-10-CM: G47.9  ICD-9-CM: 780.50  9/28/2021 - Present Unknown        Sepsis due to COVID-19 [facility]) ICD-10-CM: U07.1, A41.89  ICD-9-CM: 079.89, 995.91  9/27/2021 - Present Unknown        * (Principal) Pneumonia due to COVID-19 virus ICD-10-CM: U07.1, J12.82  ICD-9-CM: 480.8, 079.89  9/21/2021 - Present Unknown        Acute respiratory failure with hypoxia [facility]) ICD-10-CM: J96.01  ICD-9-CM: 518.81  9/20/2021 - Present Yes              Objective:     Patient Vitals for the past 24 hrs:   Temp Pulse Resp BP SpO2   10/04/21 1104 98 °F (36.7 °C) (!) 104 20 (!) 96/55 94 %   10/04/21 0918 -- -- -- -- 93 %   10/04/21 0746 98.2 °F (36.8 °C) 98 20 122/78 93 %   10/04/21 0430 -- (!) 116 -- -- --   10/04/21 0306 97.9 °F (36.6 °C) (!) 111 20 108/76 94 %   10/04/21 0019 -- (!) 119 -- -- --   10/03/21 2241 98.9 °F (37.2 °C) (!) 133 18 (!) 88/62 92 %   10/03/21 2122 -- (!) 132 -- 92/61 --   10/03/21 2032 -- (!) 125 -- -- --   10/03/21 1947 -- -- -- -- 95 %   10/03/21 1919 98.3 °F (36.8 °C) (!) 123 18 94/69 92 %   10/03/21 1608 98 °F (36.7 °C) (!) 121 20 101/74 95 %     Oxygen Therapy  O2 Sat (%): 94 % (10/04/21 1104)  Pulse via Oximetry: 113 beats per minute (10/03/21 1947)  O2 Device: Nasal cannula (10/04/21 0918)  Skin Assessment: Clean, dry, & intact (10/02/21 2020)  O2 Flow Rate (L/min): 1 l/min (10/04/21 0918)  FIO2 (%): 21 % (10/03/21 0922)    Estimated body mass index is 30.66 kg/m² as calculated from the following:    Height as of this encounter: 5' 9\" (1.753 m). Weight as of this encounter: 94.2 kg (207 lb 9.6 oz).     Intake/Output Summary (Last 24 hours) at 10/4/2021 1315  Last data filed at 10/3/2021 1817  Gross per 24 hour   Intake 240 ml   Output --   Net 240 ml Physical Exam:     General:    Well nourished. No overt distress  Head:  Normocephalic, atraumatic  Eyes:  Sclerae appear normal.  Pupils equally round. ENT:  Nares appear normal, no drainage. Moist oral mucosa  Neck:  No restricted ROM. Trachea midline   CV:   Tachycardic rate, rhythm regular. No m/r/g. No jugular venous distension. Lungs:   CTAB. No wheezing, rhonchi, or rales. Respirations even, unlabored  Abdomen: Bowel sounds present. Soft, nontender, nondistended. Extremities: No cyanosis or clubbing. No edema  Skin:     No rashes and normal coloration. Warm and dry. Neuro:  Cranial nerves II-XII grossly intact. A&Ox3  Psych:  Normal mood and affect. I have reviewed ordered lab tests and independently visualized imaging below:    Last 24hr Labs:  No results found for this or any previous visit (from the past 24 hour(s)). All Micro Results     Procedure Component Value Units Date/Time    CULTURE, BLOOD [[id number]] Collected: 09/27/21 1617    Order Status: Completed Specimen: Blood Updated: 10/02/21 1215     Special Requests: --        LEFT  HAND       Culture result: NO GROWTH 5 DAYS       CULTURE, BLOOD [[id number]] Collected: 09/27/21 1617    Order Status: Completed Specimen: Blood Updated: 10/02/21 1215     Special Requests: --        RIGHT  Antecubital       Culture result: NO GROWTH 5 DAYS       C. DIFFICILE AG & TOXIN A/B [[id number]]     Order Status: Canceled Specimen: Stool     CULTURE, STOOL [[id number]]     Order Status: Canceled Specimen: Stool     CULTURE, RESPIRATORY/SPUTUM/BRONCH Elzbieta Trevizop [[id number]] Collected: 09/25/21 1715    Order Status: Canceled Specimen: Sputum           Other Studies:  No results found.     Current Meds:  Current Facility-Administered Medications   Medication Dose Route Frequency    metoprolol tartrate (LOPRESSOR) tablet 12.5 mg  12.5 mg Oral TID    Saccharomyces boulardii (FLORASTOR) capsule 250 mg  250 mg Oral BID    melatonin tablet 5 mg  5 mg Oral QHS    traZODone (DESYREL) tablet 50 mg  50 mg Oral QHS PRN    sodium chloride (OCEAN) 0.65 % nasal squeeze bottle 2 Spray  2 Spray Both Nostrils Q2H PRN    guaiFENesin (ROBITUSSIN) 100 mg/5 mL oral liquid 100 mg  100 mg Oral Q4H PRN    budesonide-formoterol (SYMBICORT) 80-4.5 mcg inhaler  2 Puff Inhalation BID RT    albuterol (PROVENTIL HFA, VENTOLIN HFA, PROAIR HFA) inhaler 2 Puff  2 Puff Inhalation Q6H PRN    clonazePAM (KlonoPIN) tablet 0.5 mg  0.5 mg Oral BID PRN    baricitinib (OLUMIANT) tablet 4 mg  4 mg Oral DAILY    enoxaparin (LOVENOX) injection 40 mg  40 mg SubCUTAneous DAILY    sodium chloride (NS) flush 5-40 mL  5-40 mL IntraVENous Q8H    sodium chloride (NS) flush 5-40 mL  5-40 mL IntraVENous PRN    acetaminophen (TYLENOL) tablet 650 mg  650 mg Oral Q6H PRN    Or    acetaminophen (TYLENOL) suppository 650 mg  650 mg Rectal Q6H PRN    polyethylene glycol (MIRALAX) packet 17 g  17 g Oral DAILY PRN    ondansetron (ZOFRAN ODT) tablet 4 mg  4 mg Oral Q8H PRN    Or    ondansetron (ZOFRAN) injection 4 mg  4 mg IntraVENous Q6H PRN       Signed:  RAYMOND Wellington    Part of this note may have been written by using a voice dictation software. The note has been proof read but may still contain some grammatical/other typographical errors.

## 2021-10-04 NOTE — ROUTINE PROCESS
Shift report received from Ashley Jonas. Patient already awake and in recliner with no distress on 2L. IVF infusing. Call light within reach and encouraged to call for needs.

## 2021-10-04 NOTE — PROGRESS NOTES
MSN, CM:  Patient currently on 2L NC today. Patient sinus tach 160 over weekend. Patient started on Lopressor and coreg. BP at 96/55 and HR at 104 this afternoon. Hopefully able to discharge tomorrow with no services. May need home oxygen. Case Management will continue to follow.

## 2021-10-04 NOTE — ROUTINE PROCESS
Shift report given to on coming RN, Dolores Cox. Patient continues to rest in bed with no distress on 1 HF. Call light within reach and no distress noted.

## 2021-10-05 VITALS
DIASTOLIC BLOOD PRESSURE: 72 MMHG | WEIGHT: 207.6 LBS | HEIGHT: 69 IN | BODY MASS INDEX: 30.75 KG/M2 | RESPIRATION RATE: 20 BRPM | TEMPERATURE: 98.4 F | SYSTOLIC BLOOD PRESSURE: 106 MMHG | OXYGEN SATURATION: 94 % | HEART RATE: 130 BPM

## 2021-10-05 LAB
ALBUMIN SERPL-MCNC: 3.2 G/DL (ref 3.5–5)
ALBUMIN/GLOB SERPL: 0.8 {RATIO} (ref 1.2–3.5)
ALP SERPL-CCNC: 107 U/L (ref 50–136)
ALT SERPL-CCNC: 144 U/L (ref 12–65)
ANION GAP SERPL CALC-SCNC: 9 MMOL/L (ref 7–16)
AST SERPL-CCNC: 37 U/L (ref 15–37)
BASOPHILS # BLD: 0.1 K/UL (ref 0–0.2)
BASOPHILS NFR BLD: 1 % (ref 0–2)
BILIRUB DIRECT SERPL-MCNC: 0.1 MG/DL
BILIRUB SERPL-MCNC: 0.4 MG/DL (ref 0.2–1.1)
BUN SERPL-MCNC: 10 MG/DL (ref 6–23)
CALCIUM SERPL-MCNC: 9.2 MG/DL (ref 8.3–10.4)
CHLORIDE SERPL-SCNC: 106 MMOL/L (ref 98–107)
CO2 SERPL-SCNC: 24 MMOL/L (ref 21–32)
CREAT SERPL-MCNC: 0.8 MG/DL (ref 0.8–1.5)
D DIMER PPP FEU-MCNC: <0.27 UG/ML(FEU)
DIFFERENTIAL METHOD BLD: ABNORMAL
EOSINOPHIL # BLD: 0.3 K/UL (ref 0–0.8)
EOSINOPHIL NFR BLD: 2 % (ref 0.5–7.8)
ERYTHROCYTE [DISTWIDTH] IN BLOOD BY AUTOMATED COUNT: 13.5 % (ref 11.9–14.6)
GLOBULIN SER CALC-MCNC: 4.1 G/DL (ref 2.3–3.5)
GLUCOSE SERPL-MCNC: 128 MG/DL (ref 65–100)
HCT VFR BLD AUTO: 51.7 % (ref 41.1–50.3)
HGB BLD-MCNC: 16.8 G/DL (ref 13.6–17.2)
IMM GRANULOCYTES # BLD AUTO: 0.1 K/UL (ref 0–0.5)
IMM GRANULOCYTES NFR BLD AUTO: 1 % (ref 0–5)
LYMPHOCYTES # BLD: 1.5 K/UL (ref 0.5–4.6)
LYMPHOCYTES NFR BLD: 10 % (ref 13–44)
MCH RBC QN AUTO: 27.6 PG (ref 26.1–32.9)
MCHC RBC AUTO-ENTMCNC: 32.5 G/DL (ref 31.4–35)
MCV RBC AUTO: 85 FL (ref 79.6–97.8)
MONOCYTES # BLD: 1.3 K/UL (ref 0.1–1.3)
MONOCYTES NFR BLD: 8 % (ref 4–12)
NEUTS SEG # BLD: 11.7 K/UL (ref 1.7–8.2)
NEUTS SEG NFR BLD: 79 % (ref 43–78)
NRBC # BLD: 0 K/UL (ref 0–0.2)
PLATELET # BLD AUTO: 645 K/UL (ref 150–450)
PMV BLD AUTO: 9.4 FL (ref 9.4–12.3)
POTASSIUM SERPL-SCNC: 3.6 MMOL/L (ref 3.5–5.1)
PROT SERPL-MCNC: 7.3 G/DL (ref 6.3–8.2)
RBC # BLD AUTO: 6.08 M/UL (ref 4.23–5.6)
SODIUM SERPL-SCNC: 139 MMOL/L (ref 138–145)
WBC # BLD AUTO: 14.9 K/UL (ref 4.3–11.1)

## 2021-10-05 PROCEDURE — 99232 SBSQ HOSP IP/OBS MODERATE 35: CPT | Performed by: INTERNAL MEDICINE

## 2021-10-05 PROCEDURE — 85379 FIBRIN DEGRADATION QUANT: CPT

## 2021-10-05 PROCEDURE — 82384 ASSAY THREE CATECHOLAMINES: CPT

## 2021-10-05 PROCEDURE — 74011250637 HC RX REV CODE- 250/637: Performed by: NURSE PRACTITIONER

## 2021-10-05 PROCEDURE — 94640 AIRWAY INHALATION TREATMENT: CPT

## 2021-10-05 PROCEDURE — 80076 HEPATIC FUNCTION PANEL: CPT

## 2021-10-05 PROCEDURE — 94761 N-INVAS EAR/PLS OXIMETRY MLT: CPT

## 2021-10-05 PROCEDURE — 74011250637 HC RX REV CODE- 250/637: Performed by: INTERNAL MEDICINE

## 2021-10-05 PROCEDURE — 36415 COLL VENOUS BLD VENIPUNCTURE: CPT

## 2021-10-05 PROCEDURE — 74011250637 HC RX REV CODE- 250/637: Performed by: FAMILY MEDICINE

## 2021-10-05 PROCEDURE — 80048 BASIC METABOLIC PNL TOTAL CA: CPT

## 2021-10-05 PROCEDURE — 74011250636 HC RX REV CODE- 250/636: Performed by: STUDENT IN AN ORGANIZED HEALTH CARE EDUCATION/TRAINING PROGRAM

## 2021-10-05 PROCEDURE — 85025 COMPLETE CBC W/AUTO DIFF WBC: CPT

## 2021-10-05 RX ORDER — METOPROLOL TARTRATE 25 MG/1
12.5 TABLET, FILM COATED ORAL 2 TIMES DAILY
Status: DISCONTINUED | OUTPATIENT
Start: 2021-10-06 | End: 2021-10-05 | Stop reason: HOSPADM

## 2021-10-05 RX ORDER — METOPROLOL TARTRATE 25 MG/1
12.5 TABLET, FILM COATED ORAL 2 TIMES DAILY
Qty: 30 TABLET | Refills: 1 | Status: SHIPPED | OUTPATIENT
Start: 2021-10-06 | End: 2021-11-23 | Stop reason: SDUPTHER

## 2021-10-05 RX ORDER — CLONAZEPAM 0.5 MG/1
0.5 TABLET ORAL
Qty: 6 TABLET | Refills: 0 | Status: SHIPPED | OUTPATIENT
Start: 2021-10-05 | End: 2021-10-06 | Stop reason: ALTCHOICE

## 2021-10-05 RX ORDER — SAME BUTANEDISULFONATE/BETAINE 400-600 MG
250 POWDER IN PACKET (EA) ORAL 2 TIMES DAILY
Qty: 14 CAPSULE | Refills: 0 | Status: SHIPPED | OUTPATIENT
Start: 2021-10-05 | End: 2021-10-12

## 2021-10-05 RX ADMIN — CLONAZEPAM 0.5 MG: 0.5 TABLET ORAL at 13:38

## 2021-10-05 RX ADMIN — Medication 250 MG: at 08:34

## 2021-10-05 RX ADMIN — IVABRADINE 5 MG: 5 TABLET, FILM COATED ORAL at 16:28

## 2021-10-05 RX ADMIN — METOPROLOL TARTRATE 12.5 MG: 25 TABLET, FILM COATED ORAL at 08:34

## 2021-10-05 RX ADMIN — ENOXAPARIN SODIUM 40 MG: 40 INJECTION SUBCUTANEOUS at 08:34

## 2021-10-05 RX ADMIN — Medication 10 ML: at 15:00

## 2021-10-05 RX ADMIN — BUDESONIDE AND FORMOTEROL FUMARATE DIHYDRATE 2 PUFF: 80; 4.5 AEROSOL RESPIRATORY (INHALATION) at 07:22

## 2021-10-05 RX ADMIN — Medication 10 ML: at 05:31

## 2021-10-05 RX ADMIN — BARICITINIB 4 MG: 2 TABLET, FILM COATED ORAL at 08:34

## 2021-10-05 RX ADMIN — METOPROLOL TARTRATE 12.5 MG: 25 TABLET, FILM COATED ORAL at 15:17

## 2021-10-05 NOTE — DISCHARGE SUMMARY
Hospitalist Discharge Summary   Admit Date:  2021  6:44 PM   DC Note date: 10/5/2021  Name:  Shobha Rosales. Age:  40 y.o. Sex:  male  :  1975   MRN:  [id number]   Room:  [facility]  PCP:  Candis Hare MD    Presenting Complaint: No chief complaint on file. Initial Admission Diagnosis: Pneumonia due to COVID-19 virus [U07.1, J12.82]     Problem List for this Hospitalization:  Hospital Problems as of 10/5/2021 Never Reviewed        Codes Class Noted - Resolved POA    Leukocytosis ICD-10-CM: D72.829  ICD-9-CM: 288.60  2021 - Present Unknown        Diarrhea ICD-10-CM: R19.7  ICD-9-CM: 787.91  2021 - Present Unknown        Tachycardia ICD-10-CM: R00.0  ICD-9-CM: 785.0  2021 - Present Unknown        Difficulty sleeping ICD-10-CM: G47.9  ICD-9-CM: 780.50  2021 - Present Unknown        Sepsis due to COVID-19 [facility]) ICD-10-CM: U07.1, A41.89  ICD-9-CM: 079.89, 995.91  2021 - Present Unknown        * (Principal) Pneumonia due to COVID-19 virus ICD-10-CM: U07.1, J12.82  ICD-9-CM: 480.8, 079.89  2021 - Present Unknown        Acute respiratory failure with hypoxia [facility]) ICD-10-CM: J96.01  ICD-9-CM: 518.81  2021 - Present Yes            Did Patient have Sepsis (YES OR NO): no      Hospital Course:  Jerel Aschoff is a 40year old male with no significant PMGH who presented to the ER with a complaint of worsening SOB with associated cough. Noted to desat into the high 80s on RA. CXR showed COVID infiltrative findings. He denied any known COVID contacts and had a recent COVID test at the Urgent Care but did not get the results. In ER pt's rapid test was negative but PCR was positive. Pt was promptly admitted for acute hypoxic rsp failure in setting of COVID 19 infection. Pt's O2 requirements continued to decrease during subsequent hospital days after being transitioned over from remdesivir to baricitinib. He continued with decadron 6mg daily.   He had a CT chest on 2021 which was negative for acute PE. He continued to have persistent tachycardia though pt denied any palpitations, chest pains, dizziness. He reported that he's had \"a high heart rate\" ever since he was a child but never had a medical work up for tachycardia. Although clinically from a pulmonary perspective he continued to improve with improved CRPs and stable cxray along with decreased O2 requirements (down to 2 L via NC at rest on 10/4/2021), pt remained tachycardic with low-normal tensive BP readings. On 10/3/2021 monitor room called indicating pt's HR was in the 160s at rest despite newly started betablocker tx and gentle fluid resuscitiation, cardiology consulted for further mgmt. Of note, his CRP, procal, TSH, h/h was normal on 10/2/2021 with unremarkable D-dimer and stable cxray. 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 As pt's BP would not allow for further titration of BB dose, Dr. Ramses Larsen did start patient on ivabradine to help with his tachycardia but from cardiology standpoint pt was STABLE for discharge home. Currently pending is the results of the random urine catecholamine level which can be followed up by PCP outpatient. Pt did request to cont anxiolytic tx on discharge as he admits to having had increased anxiety with dyspnea during the hospitalization. I recommended pt f/u with his PCP to continue xanax as I will only write him a 3 day \"PRN\" supply of xanax; I further educated patient that as his respiratory status has significantly improved since hospitalization, his anxiety from breathlessness should also improve. On walk test to qualify for oxygen today, pt did qualify for supplemental o2 on exertion (see O2 qualifier results below).   He will be dc'd home on supplemental o2 today and I further educated pt to f/u with PCP and cardiology outpatient. Oxygen Qualifier          Room air: SpO2 with O2 and liter flow   Resting SpO2  91%     Ambulating SpO2  91% 84% on 1L, 86 on 2lpm, 91 on 3lpm         Completed by:     RT Dewayne                 Disposition: Home or Self Care  Diet: ADULT DIET Regular; 3 carb choices (45 gm/meal)  ADULT ORAL NUTRITION SUPPLEMENT Dinner; Standard High Calorie/High Protein  Code Status: Full Code    Follow Up Orders: Follow-up Appointments   Procedures    FOLLOW UP VISIT Appointment in: One Week PCP     PCP     Standing Status:   Standing     Number of Occurrences:   1     Order Specific Question:   Appointment in     Answer: One Week       Follow-up Information     Follow up With Specialties Details Why Contact Info    [facility] OFFICE Cardiology On 11/19/2021 for echo at 11:30 am in [place] office [address] [address] [phone]    Shy Blevins MD Cardiology On 11/23/2021 at 1pm in [place] office  [phone]      Other, MD Margie    Patient can only remember the practice name and not the physician            Suggested initial follow up labs/diagnostics (ultimately defer to outpatient provider): Holter monitor, f/u random urine catecholamine from 10/5/2021    Time spent in patient discharge and coordination 45 minutes. Plan was discussed with patient, RN, case mgmt, Dr. Flora Ashley, Dr. Collin Amador. All questions answered. Patient was stable at time of discharge. Instructions given to call a physician or return if any concerns. Discharge Info:   Current Discharge Medication List      START taking these medications    Details   clonazePAM (KlonoPIN) 0.5 mg tablet Take 1 Tablet by mouth two (2) times daily as needed for Anxiety for up to 3 days. Max Daily Amount: 1 mg.   Qty: 6 Tablet, Refills: 0  Start date: 10/5/2021, End date: 10/8/2021    Associated Diagnoses: Anxiety      ivabradine (CORLANOR) 5 mg tablet Take 1 Tablet by mouth two (2) times daily (with meals) for 60 days. Qty: 60 Tablet, Refills: 1  Start date: 10/5/2021, End date: 12/4/2021      metoprolol tartrate (LOPRESSOR) 25 mg tablet Take 0.5 Tablets by mouth two (2) times a day for 60 days. Qty: 30 Tablet, Refills: 1  Start date: 10/6/2021, End date: 12/5/2021      Saccharomyces boulardii (FLORASTOR) 250 mg capsule Take 1 Capsule by mouth two (2) times a day for 7 days. Qty: 14 Capsule, Refills: 0  Start date: 10/5/2021, End date: 10/12/2021             Procedures done this admission:  * No surgery found *    Consults this admission:  IP CONSULT TO CARDIOLOGY    Echocardiogram/EKG results:  No results found for this or any previous visit. EKG Results     Procedure 720 Value Units Date/Time    EKG, 12 LEAD, INITIAL [[id number]] Collected: 10/02/21 1640    Order Status: Completed Updated: 10/03/21 1019     Ventricular Rate 137 BPM      Atrial Rate 137 BPM      P-R Interval 124 ms      QRS Duration 86 ms      Q-T Interval 294 ms      QTC Calculation (Bezet) 443 ms      Calculated P Axis 38 degrees      Calculated R Axis -23 degrees      Calculated T Axis 18 degrees      Diagnosis --     Sinus tachycardia  Possible Anterior infarct , old  No previous ECGs available  Confirmed by Lotus Alexandre MD, Fausto Jaramillo ([id number]) on 10/3/2021 10:19:42 AM            Diagnostic Imaging/Tests:   XR CHEST SNGL V    Result Date: 10/2/2021  EXAM: XR CHEST SNGL V INDICATION: covid COMPARISON: 9/28/2021 FINDINGS: A portable AP radiograph of the chest was obtained at 1258 hours. The patient is on a cardiac monitor. Bilateral groundglass opacities and increased interstitial markings unchanged. The cardiac and mediastinal contours and pulmonary vascularity are normal.  The bones and soft tissues are grossly within normal limits. Bilateral Covid pneumonia unchanged.     XR CHEST SNGL V    Result Date: 9/28/2021  EXAM: Chest x-ray. INDICATION: Dyspnea. Covid 19. COMPARISON: September 25, 2021. TECHNIQUE: Frontal view chest x-ray. FINDINGS: Patchy infiltrate throughout the right lung is unchanged. There is mildly progressed left lung base infiltrate. The heart size is normal. No pneumothorax or pleural effusion is seen. Unchanged right and mildly progressed left lung infiltrates. XR CHEST SNGL V    Result Date: 9/25/2021  CHEST RADIOGRAPH, 1 views, 9/25/2021 History: Hypoxia with Covid infection. Technique: Portable frontal view of the chest. Comparison: Chest radiograph 9/20/2021 Findings: The cardiac silhouette is not enlarged. There is no pneumothorax. Evolving peripheral infiltrate is seen most evident in the right mid and lower lung field, and left lung base. No significant pleural effusion is seen. 1.  Evolving peripheral infiltrate, right greater than left, consistent with the history of Covid pneumonia. This report was made using voice transcription. Despite my best efforts to avoid any, transcription errors may persist. If there is any question about the accuracy of the report or need for clarification, then please call [phone], or text me through [employer] for clarification or correction. XR CHEST PA LAT    Result Date: 9/20/2021  EXAM: XR CHEST PA LAT INDICATION: SOB; possible COVID COMPARISON: None. FINDINGS: PA and lateral radiographs of the chest demonstrate clear lungs. The cardiac and mediastinal contours and pulmonary vascularity are normal. The bones and soft tissues are within normal limits. Normal chest.    CT CHEST PULMONARY EMBOLISM    Result Date: 9/20/2021  CTA OF THE CHEST - PE STUDY HISTORY: Shortness of breath, hypoxia. COMPARISON: None TECHNIQUE: A helical acquisition was performed through the chest utilizing 3.91MC slice thickness during the infusion of 1 cc of Isovue-370. 3-D post-processed images were created on an independent workstation. Multiplanar reformats were obtained. The exam was focused on the pulmonary arteries. Dose reduction techniques used: Automated exposure control, adjustment of the mAs and/or kVp according to patient's size, and iterative reconstruction techniques. FINDINGS: *  PULMONARY VESSELS: No evidence of pulmonary embolism. *  PLEURA / PERICARDIUM: Within normal limits. *  ARBEN / MEDIASTINUM: Within normal limits. *  LUNGS: Bilateral groundglass opacities. *  TRACHEOBRONCHIAL TREE: Within normal limits. *  AORTA: Within normal limits. *  CORONARY ARTERIES: No coronary artery calcification is seen. *  CHEST WALL/AXILLA: Within normal limits. *  VISUALIZED UPPER ABDOMEN: Within normal limits. *  SPINE / BONES: Within normal limits. *  ADDITIONAL COMMENTS: None. No evidence of pulmonary embolism. Bilateral Covid pneumonia.  Date of Dictation: 9/20/2021 8:49 PM      All Micro Results     Procedure Component Value Units Date/Time    CULTURE, BLOOD [[id number]] Collected: 09/27/21 1617    Order Status: Completed Specimen: Blood Updated: 10/02/21 1215     Special Requests: --        LEFT  HAND       Culture result: NO GROWTH 5 DAYS       CULTURE, BLOOD [[id number]] Collected: 09/27/21 1617    Order Status: Completed Specimen: Blood Updated: 10/02/21 1215     Special Requests: --        RIGHT  Antecubital       Culture result: NO GROWTH 5 DAYS       C. DIFFICILE AG & TOXIN A/B [[id number]]     Order Status: Canceled Specimen: Stool     CULTURE, STOOL [[id number]]     Order Status: Canceled Specimen: Stool     CULTURE, RESPIRATORY/SPUTUM/BRONCH Weyman Prima STAIN [[id number]] Collected: 09/25/21 1715    Order Status: Canceled Specimen: Sputum           Labs: Results:       BMP, Mg, Phos Recent Labs     10/05/21  0917 10/03/21  0657    143   K 3.6 4.1    108*   CO2 24 29   AGAP 9 6*   BUN 10 9   CREA 0.80 0.85   CA 9.2 8.9   * 99      CBC Recent Labs     10/05/21  0917 10/03/21  0657   WBC 14.9* 14.5*   RBC 6.08* 5.42   HGB 16.8 15.3   HCT 51.7* 47.3   * 587*   GRANS 79* 79*   LYMPH 10* 9*   EOS 2 3   MONOS 8 9   BASOS 1 0   IG 1 1   ANEU 11.7* 11.4*   ABL 1.5 1.3   LORA 0.3 0.4   ABM 1.3 1.3   ABB 0.1 0.1   AIG 0.1 0.1      LFT Recent Labs     10/05/21  0917 10/03/21  0657   * 164*    93   TP 7.3 6.5   ALB 3.2* 2.9*   GLOB 4.1* 3.6*   AGRAT 0.8* 0.8*      Cardiac Testing No results found for: BNPP, BNP, CPK, RCK1, RCK2, RCK3, RCK4, CKMB, CKNDX, CKND1, TROPT, TROIQ   Coagulation Tests No results found for: PTP, INR, APTT, INREXT   A1c Lab Results   Component Value Date/Time    Hemoglobin A1c 5.4 02/20/2018 02:16 PM      Lipid Panel Lab Results   Component Value Date/Time    Cholesterol, total 230 (H) 02/20/2018 02:16 PM    HDL Cholesterol 48 02/20/2018 02:16 PM    LDL, calculated 149 (H) 02/20/2018 02:16 PM    VLDL, calculated 33 02/20/2018 02:16 PM    Triglyceride 164 (H) 02/20/2018 02:16 PM      Thyroid Panel Lab Results   Component Value Date/Time    TSH 3.620 10/02/2021 07:01 AM        Most Recent UA No results found for: COLOR, APPRN, REFSG, YANET, PROTU, GLUCU, KETU, BILU, BLDU, UROU, EMERSON, LEUKU, WBCU, RBCU, UEPI, BACTU, CASTS, UCRY, MUCUS, UCOM       All Labs from Last 24 Hrs:  Recent Results (from the past 24 hour(s))   CBC WITH AUTOMATED DIFF    Collection Time: 10/05/21  9:17 AM   Result Value Ref Range    WBC 14.9 (H) 4.3 - 11.1 K/uL    RBC 6.08 (H) 4.23 - 5.6 M/uL    HGB 16.8 13.6 - 17.2 g/dL    HCT 51.7 (H) 41.1 - 50.3 %    MCV 85.0 79.6 - 97.8 FL    MCH 27.6 26.1 - 32.9 PG    MCHC 32.5 31.4 - 35.0 g/dL    RDW 13.5 11.9 - 14.6 %    PLATELET 152 (H) 450 - 450 K/uL    MPV 9.4 9.4 - 12.3 FL    ABSOLUTE NRBC 0.00 0.0 - 0.2 K/uL    DF AUTOMATED      NEUTROPHILS 79 (H) 43 - 78 %    LYMPHOCYTES 10 (L) 13 - 44 %    MONOCYTES 8 4.0 - 12.0 %    EOSINOPHILS 2 0.5 - 7.8 %    BASOPHILS 1 0.0 - 2.0 %    IMMATURE GRANULOCYTES 1 0.0 - 5.0 %    ABS. NEUTROPHILS 11.7 (H) 1.7 - 8.2 K/UL    ABS.  LYMPHOCYTES 1.5 0.5 - 4.6 K/UL    ABS. MONOCYTES 1.3 0.1 - 1.3 K/UL    ABS. EOSINOPHILS 0.3 0.0 - 0.8 K/UL    ABS. BASOPHILS 0.1 0.0 - 0.2 K/UL    ABS. IMM. GRANS. 0.1 0.0 - 0.5 K/UL   METABOLIC PANEL, BASIC    Collection Time: 10/05/21  9:17 AM   Result Value Ref Range    Sodium 139 138 - 145 mmol/L    Potassium 3.6 3.5 - 5.1 mmol/L    Chloride 106 98 - 107 mmol/L    CO2 24 21 - 32 mmol/L    Anion gap 9 7 - 16 mmol/L    Glucose 128 (H) 65 - 100 mg/dL    BUN 10 6 - 23 MG/DL    Creatinine 0.80 0.8 - 1.5 MG/DL    GFR est AA >60 >60 ml/min/1.73m2    GFR est non-AA >60 >60 ml/min/1.73m2    Calcium 9.2 8.3 - 10.4 MG/DL   D DIMER    Collection Time: 10/05/21  9:17 AM   Result Value Ref Range    D DIMER <0.27 <0.56 ug/ml(FEU)   HEPATIC FUNCTION PANEL    Collection Time: 10/05/21  9:17 AM   Result Value Ref Range    Protein, total 7.3 6.3 - 8.2 g/dL    Albumin 3.2 (L) 3.5 - 5.0 g/dL    Globulin 4.1 (H) 2.3 - 3.5 g/dL    A-G Ratio 0.8 (L) 1.2 - 3.5      Bilirubin, total 0.4 0.2 - 1.1 MG/DL    Bilirubin, direct 0.1 <0.4 MG/DL    Alk.  phosphatase 107 50 - 136 U/L    AST (SGOT) 37 15 - 37 U/L    ALT (SGPT) 144 (H) 12 - 65 U/L       Current Med List in Hospital:   Current Facility-Administered Medications   Medication Dose Route Frequency    ivabradine (CORLANOR) tablet 5 mg  5 mg Oral BID WITH MEALS    [START ON 10/6/2021] metoprolol tartrate (LOPRESSOR) tablet 12.5 mg  12.5 mg Oral BID    Saccharomyces boulardii (FLORASTOR) capsule 250 mg  250 mg Oral BID    melatonin tablet 5 mg  5 mg Oral QHS    traZODone (DESYREL) tablet 50 mg  50 mg Oral QHS PRN    sodium chloride (OCEAN) 0.65 % nasal squeeze bottle 2 Spray  2 Spray Both Nostrils Q2H PRN    guaiFENesin (ROBITUSSIN) 100 mg/5 mL oral liquid 100 mg  100 mg Oral Q4H PRN    budesonide-formoterol (SYMBICORT) 80-4.5 mcg inhaler  2 Puff Inhalation BID RT    albuterol (PROVENTIL HFA, VENTOLIN HFA, PROAIR HFA) inhaler 2 Puff  2 Puff Inhalation Q6H PRN    clonazePAM (KlonoPIN) tablet 0.5 mg  0.5 mg Oral BID PRN    baricitinib (OLUMIANT) tablet 4 mg  4 mg Oral DAILY    enoxaparin (LOVENOX) injection 40 mg  40 mg SubCUTAneous DAILY    sodium chloride (NS) flush 5-40 mL  5-40 mL IntraVENous Q8H    sodium chloride (NS) flush 5-40 mL  5-40 mL IntraVENous PRN    acetaminophen (TYLENOL) tablet 650 mg  650 mg Oral Q6H PRN    Or    acetaminophen (TYLENOL) suppository 650 mg  650 mg Rectal Q6H PRN    polyethylene glycol (MIRALAX) packet 17 g  17 g Oral DAILY PRN    ondansetron (ZOFRAN ODT) tablet 4 mg  4 mg Oral Q8H PRN    Or    ondansetron (ZOFRAN) injection 4 mg  4 mg IntraVENous Q6H PRN       Allergies   Allergen Reactions    Pcn [Penicillins] Hives       There is no immunization history on file for this patient. Recent Vital Data:  Patient Vitals for the past 24 hrs:   Temp Pulse Resp BP SpO2   10/05/21 1517 -- (!) 130 -- -- --   10/05/21 1506 98.4 °F (36.9 °C) 90 20 106/72 94 %   10/05/21 1457 -- (!) 130 -- -- --   10/05/21 1330 -- (!) 142 -- -- --   10/05/21 1125 -- (!) 125 -- -- --   10/05/21 1102 98.4 °F (36.9 °C) (!) 140 20 100/67 92 %   10/05/21 1015 -- (!) 142 -- -- --   10/05/21 0922 -- (!) 150 20 113/74 --   10/05/21 0801 98.4 °F (36.9 °C) (!) 130 22 97/69 94 %   10/05/21 0724 -- -- -- -- 94 %   10/05/21 0308 98.4 °F (36.9 °C) (!) 129 22 92/67 92 %   10/04/21 2226 98 °F (36.7 °C) (!) 132 22 91/67 91 %   10/04/21 2041 -- -- -- -- 93 %   10/04/21 1908 98 °F (36.7 °C) (!) 131 25 91/68 92 %     Oxygen Therapy  O2 Sat (%): 94 % (10/05/21 1506)  Pulse via Oximetry: 132 beats per minute (10/05/21 0724)  O2 Device: None (Room air) (10/05/21 0801)  Skin Assessment: Clean, dry, & intact (10/02/21 2020)  O2 Flow Rate (L/min): 1 l/min (10/04/21 1503)  FIO2 (%): 21 % (10/03/21 0922)    Estimated body mass index is 30.66 kg/m² as calculated from the following:    Height as of this encounter: 5' 9\" (1.753 m). Weight as of this encounter: 94.2 kg (207 lb 9.6 oz).   No intake or output data in the 24 hours ending 10/05/21 1602      Physical Exam:    General:    Well nourished. No overt distress  Head:  Normocephalic, atraumatic  Eyes:  Sclerae appear normal.  Pupils equally round. HENT:  Nares appear normal, no drainage. Moist mucous membranes  Neck:  No restricted ROM. Trachea midline  CV:   Tachycardic rate, regular rhythm. No m/r/g. No JVD  Lungs:   CTAB. No wheezing, rhonchi, or rales. Even, unlabored  Abdomen:   Soft, nontender, nondistended. Extremities: Warm and dry. No cyanosis or clubbing. No edema. Skin:     No rashes. Normal coloration  Neuro:  Cranial nerves II-XII grossly intact. Psych:  Normal mood and affect. Signed:  RAYMOND Stanton    Part of this note may have been written by using a voice dictation software. The note has been proof read but may still contain some grammatical/other typographical errors.

## 2021-10-05 NOTE — PROGRESS NOTES
[facility] CARDIOLOGY PROGRESS NOTE           10/5/2021 3:02 PM    Admit Date: 9/21/2021      Subjective:     States improved symptoms. Currently on room air. In sinus tachycardia. Denies any chest pain/palpitations. Wants to go home and agitated about being inpt    ROS:  Cardiovascular:  As noted above    Objective:      Vitals:    10/05/21 1330 10/05/21 1457 10/05/21 1506 10/05/21 1517   BP:   106/72    Pulse: (!) 142 (!) 130 90 (!) 130   Resp:   20    Temp:   98.4 °F (36.9 °C)    SpO2:   94%    Weight:       Height:           Physical Exam:  General-No Acute Distress  Neck- supple, no JVD  CV- tachy; regular rhythm no MRG  Lung- clear bilaterally  Abd- soft, nontender, nondistended  Ext- no edema bilaterally. Skin- warm and dry    Data Review:   Recent Labs     10/05/21  0917 10/03/21  0657    143   K 3.6 4.1   BUN 10 9   CREA 0.80 0.85   * 99   WBC 14.9* 14.5*   HGB 16.8 15.3   HCT 51.7* 47.3   * 587*       Assessment/Plan:     Principal Problem:    Pneumonia due to COVID-19 virus (9/21/2021)  -Management per primary team    Active Problems:    Acute respiratory failure with hypoxia ([address] (9/20/2021)  -Improved. 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  -Some consideration for inappropriate sinus tachycardia per history.  -Consideration for outpatient Holter once resolution of acute issues.  -Continue low-dose beta-blocker at this time. Low BPs limit titration options. Add on corlanor for now and can reassess as an outpt      Difficulty sleeping (9/28/2021)      Leukocytosis (9/29/2021)      Diarrhea (9/29/2021)    Ok to d/c from a cardiac standpoint and plan outpt f/u in ~3-4 weeks.      Cordell Vargas MD  10/5/2021 3:02 PM

## 2021-10-05 NOTE — PROGRESS NOTES
Hospitalist Progress Note   Admit Date:  2021  6:44 PM   Name:  Mya Dawn. Age:  40 y.o. Sex:  male  :  1975   MRN:  [id number]   Room:  [facility]/    Presenting Complaint: No chief complaint on file. Reason(s) for Admission: Pneumonia due to COVID-19 virus [U07.1, J12.82]     Hospital Course & Interval History:   Marva Dutton is a 40year old male with no significant PMGH who presented to the ER with a complaint of worsening SOB with associated cough. Noted to desat into the high 80s on RA. CXR showed COVID     10/3/2021: persistent tachycardia, HR 160s at rest; although remained asymptomatic, cardiology consulted  10/4/2021: resp status stable, RA at rest, 1-2L supplemental on exertion; asymptomatic tachycardia persistent despite BB    Subjective (10/05/21):  \"I just want to go home; my breathing is fine. \" Pleasant 46y. o. WM laying in bed without complaints, on RA; persistent tachycardia (140-150)    Review of Systems:  10 systems reviewed and negative except as noted in HPI.       Assessment & Plan:     Acute respiratory failure with hypoxia 2/2 Covid-19 Pneumonia, worsening  - COVID+ 2021, CT chest negative for PE  - s/p decadron course; baricitinib EOT 10/6/2021  - on RA at rest, 1-2L on exertion    Sinus Tachycardia  - persistent tachycardia; TSH / D dimer normal  - check random urine and plasma catecholamine levels  - cardiology following, has been on low-dose metoprolol tid  - HR at rest this -150    Mild hypotension  - off IVF, on BB  - monitor    Thrombocytosis  - attributed to acute COVID viral infection  - trending down 10/3/2021; f/u CBC this AM pending    Diarrhea  - resolved     Spoke with pt's daughter Aliya Castillo @[phone] and updated her on current plan of care; she was told by Cardiology NP Jr Echavarria that Dr. Julio Richards will be up later today to speak with patient regarding his persistent sinus tachycardia.      Dispo/Discharge Planning:    - home once medically stable    Diet:  ADULT DIET Regular; 3 carb choices (45 gm/meal)  ADULT ORAL NUTRITION SUPPLEMENT Dinner; Standard High Calorie/High Protein  DVT PPx: Lovenox SQ   Code status: Full Code    Hospital Problems as of 10/5/2021 Never Reviewed        Codes Class Noted - Resolved POA    Leukocytosis ICD-10-CM: D72.829  ICD-9-CM: 288.60  9/29/2021 - Present Unknown        Diarrhea ICD-10-CM: R19.7  ICD-9-CM: 787.91  9/29/2021 - Present Unknown        Tachycardia ICD-10-CM: R00.0  ICD-9-CM: 785.0  9/28/2021 - Present Unknown        Difficulty sleeping ICD-10-CM: G47.9  ICD-9-CM: 780.50  9/28/2021 - Present Unknown        Sepsis due to COVID-19 [facility]) ICD-10-CM: U07.1, A41.89  ICD-9-CM: 079.89, 995.91  9/27/2021 - Present Unknown        * (Principal) Pneumonia due to COVID-19 virus ICD-10-CM: U07.1, J12.82  ICD-9-CM: 480.8, 079.89  9/21/2021 - Present Unknown        Acute respiratory failure with hypoxia [facility]) ICD-10-CM: J96.01  ICD-9-CM: 518.81  9/20/2021 - Present Yes              Objective:     Patient Vitals for the past 24 hrs:   Temp Pulse Resp BP SpO2   10/05/21 0922 -- (!) 150 20 113/74 --   10/05/21 0801 98.4 °F (36.9 °C) (!) 130 22 97/69 94 %   10/05/21 0724 -- -- -- -- 94 %   10/05/21 0308 98.4 °F (36.9 °C) (!) 129 22 92/67 92 %   10/04/21 2226 98 °F (36.7 °C) (!) 132 22 91/67 91 %   10/04/21 2041 -- -- -- -- 93 %   10/04/21 1908 98 °F (36.7 °C) (!) 131 25 91/68 92 %   10/04/21 1518 98 °F (36.7 °C) (!) 120 20 101/73 93 %   10/04/21 1104 98 °F (36.7 °C) (!) 104 20 (!) 96/55 94 %     Oxygen Therapy  O2 Sat (%): 94 % (10/05/21 0801)  Pulse via Oximetry: 132 beats per minute (10/05/21 0724)  O2 Device: None (Room air) (10/05/21 0724)  Skin Assessment: Clean, dry, & intact (10/02/21 2020)  O2 Flow Rate (L/min): 1 l/min (10/04/21 1503)  FIO2 (%): 21 % (10/03/21 0922)    Estimated body mass index is 30.66 kg/m² as calculated from the following:    Height as of this encounter: 5' 9\" (1.753 m).     Weight as of this encounter: 94.2 kg (207 lb 9.6 oz). Intake/Output Summary (Last 24 hours) at 10/5/2021 1017  Last data filed at 10/4/2021 1400  Gross per 24 hour   Intake 240 ml   Output --   Net 240 ml         Physical Exam:     General:    Well nourished. No overt distress  Head:  Normocephalic, atraumatic  Eyes:  Sclerae appear normal.  Pupils equally round. ENT:  Nares appear normal, no drainage. Moist oral mucosa  Neck:  No restricted ROM. Trachea midline   CV:   Tachycardic rate, rhythm regular. No m/r/g. No jugular venous distension. Lungs:   CTAB. No wheezing, rhonchi, or rales. Respirations even, unlabored  Abdomen: Bowel sounds present. Soft, nontender, nondistended. Extremities: No cyanosis or clubbing. No edema  Skin:     No rashes and normal coloration. Warm and dry. Neuro:  Cranial nerves II-XII grossly intact. A&Ox3  Psych:  Normal mood and affect. I have reviewed ordered lab tests and independently visualized imaging below:    Last 24hr Labs:  No results found for this or any previous visit (from the past 24 hour(s)). All Micro Results     Procedure Component Value Units Date/Time    CULTURE, BLOOD [[id number]] Collected: 09/27/21 1617    Order Status: Completed Specimen: Blood Updated: 10/02/21 1215     Special Requests: --        LEFT  HAND       Culture result: NO GROWTH 5 DAYS       CULTURE, BLOOD [[id number]] Collected: 09/27/21 1617    Order Status: Completed Specimen: Blood Updated: 10/02/21 1215     Special Requests: --        RIGHT  Antecubital       Culture result: NO GROWTH 5 DAYS       C. DIFFICILE AG & TOXIN A/B [[id number]]     Order Status: Canceled Specimen: Stool     CULTURE, STOOL [[id number]]     Order Status: Canceled Specimen: Stool     CULTURE, RESPIRATORY/SPUTUM/BRONCH Elzbieta Larisa [[id number]] Collected: 09/25/21 1715    Order Status: Canceled Specimen: Sputum           Other Studies:  No results found.     Current Meds:  Current Facility-Administered Medications Medication Dose Route Frequency    metoprolol tartrate (LOPRESSOR) tablet 12.5 mg  12.5 mg Oral TID    Saccharomyces boulardii (FLORASTOR) capsule 250 mg  250 mg Oral BID    melatonin tablet 5 mg  5 mg Oral QHS    traZODone (DESYREL) tablet 50 mg  50 mg Oral QHS PRN    sodium chloride (OCEAN) 0.65 % nasal squeeze bottle 2 Spray  2 Spray Both Nostrils Q2H PRN    guaiFENesin (ROBITUSSIN) 100 mg/5 mL oral liquid 100 mg  100 mg Oral Q4H PRN    budesonide-formoterol (SYMBICORT) 80-4.5 mcg inhaler  2 Puff Inhalation BID RT    albuterol (PROVENTIL HFA, VENTOLIN HFA, PROAIR HFA) inhaler 2 Puff  2 Puff Inhalation Q6H PRN    clonazePAM (KlonoPIN) tablet 0.5 mg  0.5 mg Oral BID PRN    baricitinib (OLUMIANT) tablet 4 mg  4 mg Oral DAILY    enoxaparin (LOVENOX) injection 40 mg  40 mg SubCUTAneous DAILY    sodium chloride (NS) flush 5-40 mL  5-40 mL IntraVENous Q8H    sodium chloride (NS) flush 5-40 mL  5-40 mL IntraVENous PRN    acetaminophen (TYLENOL) tablet 650 mg  650 mg Oral Q6H PRN    Or    acetaminophen (TYLENOL) suppository 650 mg  650 mg Rectal Q6H PRN    polyethylene glycol (MIRALAX) packet 17 g  17 g Oral DAILY PRN    ondansetron (ZOFRAN ODT) tablet 4 mg  4 mg Oral Q8H PRN    Or    ondansetron (ZOFRAN) injection 4 mg  4 mg IntraVENous Q6H PRN       Signed:  RAYMOND Bennett    Part of this note may have been written by using a voice dictation software. The note has been proof read but may still contain some grammatical/other typographical errors.

## 2021-10-05 NOTE — PROGRESS NOTES
RN notified at 0900 by telemonitor room regarding patients heart rate running NSR/ST in the 140s-150s. RN at bedside to assess patient. Pt alert and oriented x 4 and resting in bed with no distress noted. pt asymptomatic. Apical  when assessed by RN. B/P stable at 113/74. Patient received scheduled po dose of Lopressor 12.5 mg at 0850. Dr Ana Maria Cheung with cardiology notified by RN at 0141, Waiting response from cardiology. Genny Cote PA, pt attending, also notified and made aware at this time. no new orders received at this time.

## 2021-10-05 NOTE — PROGRESS NOTES
MSN, CM:  Patient to be discharged home today with no services ordered or requested. Patient does now require home oxygen which will be provided by [facility]  Patient agrees with this discharge plan and has met all milestones for this admission. Family to transport patient home. Care Management Interventions  PCP Verified by CM: Yes (Has an appointment with new PCP Oct. 1st (patient does not remember name or group))  Mode of Transport at Discharge:  Other (see comment) (family to transport)  Transition of Care Consult (CM Consult): DME/Supply Assistance  Discharge Durable Medical Equipment: Yes ([facility] - oxygen)  Health Maintenance Reviewed: Yes  Support Systems: Parent(s)  Confirm Follow Up Transport: Self  Freedom of Choice List was Provided with Basic Dialogue that Supports the Patient's Individualized Plan of Care/Goals, Treatment Preferences and Shares the Quality Data Associated with the Providers?: Yes  Discharge Location  Discharge Placement: Home

## 2021-10-05 NOTE — PROGRESS NOTES
Patient resting in bed sleeping.  Vitals stable       10/05/21 1125   Vitals   Pulse (Heart Rate) (!) 125   Heart Rate Source Monitor  (tele)   Level of Consciousness Alert (0)   Cardiac Rhythm Sinus Tachy  (ST at 125 per telemonitor room)

## 2021-10-05 NOTE — PROGRESS NOTES
[facility] CARDIOLOGY PROGRESS NOTE           10/4/2021 3:02 PM    Admit Date: 9/21/2021      Subjective:     States improved symptoms. Currently on 2 L nasal cannula. In sinus tachycardia. Denies any chest pain/palpitations. ROS:  Cardiovascular:  As noted above    Objective:      Vitals:    10/04/21 0918 10/04/21 1104 10/04/21 1518 10/04/21 1908   BP:  (!) 96/55 101/73 91/68   Pulse:  (!) 104 (!) 120 (!) 131   Resp:  20 20 25   Temp:  98 °F (36.7 °C) 98 °F (36.7 °C) 98 °F (36.7 °C)   SpO2: 93% 94% 93% 92%   Weight:       Height:           Physical Exam:  General-No Acute Distress  Neck- supple, no JVD  CV- regular rate and rhythm no MRG  Lung- clear bilaterally  Abd- soft, nontender, nondistended  Ext- no edema bilaterally. Skin- warm and dry    Data Review:   Recent Labs     10/03/21  0657 10/02/21  0701     --    K 4.1  --    MG  --  2.3   BUN 9  --    CREA 0.85  --    GLU 99  --    WBC 14.5*  --    HGB 15.3  --    HCT 47.3  --    *  --        Assessment/Plan:     Principal Problem:    Pneumonia due to COVID-19 virus (9/21/2021)  -Management per primary team    Active Problems:    Acute respiratory failure with hypoxia ([address] (9/20/2021)  -Improved. Per primary team      Sepsis due to COVID-19 [facility]) (9/27/2021)      Tachycardia (9/28/2021)  -Appears sinus tachycardia likely exacerbated in the setting of COVID pneumonia.  -Some consideration to inappropriate sinus tachycardia per history.  -Consideration for outpatient Holter once resolution of acute issues.  -Continue low-dose beta-blocker at this time. Low BPs limit titration options.       Difficulty sleeping (9/28/2021)      Leukocytosis (9/29/2021)      Diarrhea (9/29/2021)    Sis Rodriguez MD  10/4/2021 3:02 PM

## 2021-10-05 NOTE — DISCHARGE INSTRUCTIONS
Patient Education        Oxygen Therapy: Care Instructions  Your Care Instructions     Oxygen therapy helps you get more oxygen into your lungs and bloodstream. You may use it if you have a disease that makes it hard to breathe, such as COPD, pulmonary fibrosis (scarring of the lungs), or heart failure. Oxygen therapy can make it easier for you to breathe and can reduce your heart's workload. Some people need extra oxygen all the time. Others need it from time to time throughout the day or overnight. A doctor will prescribe how much oxygen you need and how often to use it. To breathe the oxygen, most people use a nasal cannula (say \"LIZZETTE-yuh-divine\"). This is a thin tube with two prongs that fit just inside your nose. People who need a lot of oxygen may need to use a mask that fits over the nose and mouth. Follow-up care is a key part of your treatment and safety. Be sure to make and go to all appointments, and call your doctor if you are having problems. It's also a good idea to know your test results and keep a list of the medicines you take. How can you care for yourself at home? To help yourself  · Using oxygen may dry out your nose or lips. Use water-based lubricants on your lips or nostrils. Do not use an oil-based product like petroleum jelly. · If you use a nasal cannula, the tubing may rub under your nostrils and around your ears. To keep your skin from getting sore, tuck some gauze under the tubing. Use a water-based lotion on rubbed areas. · Do not use alcohol or take drugs that relax you, because they will slow your breathing rate. · Keep track of how much oxygen is in the tank, and reorder before it runs out. If a holiday is coming up or you expect bad weather, order in advance or make your regular order larger. · You may need extra oxygen when you travel to high altitudes or travel by plane. Ask your doctor about this.   · If you are getting oxygen directly to your windpipe through an opening in your neck, your doctor will teach you how to care for the equipment. To make sure oxygen is flowing  · Check the flow by holding your mask or cannula up to your ear and listening for the \"hiss\" of airflow. · If you have a nasal cannula, dip the prongs in a glass of water. If you see bubbles, oxygen is coming through. · Check your pressure gauge or contents indicator. · If you use an oxygen concentrator, make sure it is turned on and plugged in. If you use a cylinder, make sure the valve is open. · Look for kinks, blockages, or water in the tubing. Be sure the tubing is connected to the oxygen source. · Do not change your oxygen flow rate. Your doctor sets this at the correct level. Higher flow rates usually do not help and can increase the risk of harmful carbon dioxide buildup in the blood. To be safe  · Do not leave cords or tubing running across an area where you or someone else may trip on it. · Do not let oxygen containers get hot. Store them in a cool place where there is airflow. Do not leave them in a car trunk or a hot vehicle. · Keep oxygen containers upright. Make sure they do not fall over and get damaged. Try securing the tanks in a sturdy container or securing them with a rope or a chain. · Watch for signs of oxygen leaks. If you hear a loud hissing from your container or if it empties too fast, stay away from the container. Open windows right away and call the company that brought the oxygen system to your home. · Do not use oxygen around anything that could spark or easily cause a fire. ? Do not smoke or let others smoke while you are using oxygen. Put up \"no smoking\" signs in your home. ? Do not use oxygen near open flames, such as candles, fireplaces, gas stoves, or hot water heaters. Do not use it near electric razors, hair dryers, heating pads, or anything that may spark. ?  Keep a working fire extinguisher in your home where it is easy to get to.  ? If a fire starts, turn off the oxygen right away and leave the house. ? If you have an oxygen concentrator, do not use it if the cord looks damaged. Do not use an extension cord to plug it in. Do not plug it into an outlet that has other appliances plugged into it. To care for the equipment  · Follow the directions that come with the equipment for using and caring for it. · Wash your cannula or mask with a liquid soap and warm water 1 or 2 times a week. Replace them every 2 to 4 weeks. · If you have a cold, change the nasal prongs when your cold symptoms are done. · If you have an oxygen concentrator, unplug the unit and wipe down the cabinet with a damp cloth daily. Clean the air filter at least 2 times a week. Where can you learn more? Go to http://www.Envis/  Enter E117 in the search box to learn more about \"Oxygen Therapy: Care Instructions. \"  Current as of: July 6, 2021               Content Version: 13.0  © 2006-2021 [employer]. Care instructions adapted under license by [employer] (which disclaims liability or warranty for this information). If you have questions about a medical condition or this instruction, always ask your healthcare professional. [place] [address] any warranty or liability for your use of this information. DISCHARGE SUMMARY from Nurse    PATIENT INSTRUCTIONS:    After general anesthesia or intravenous sedation, for 24 hours or while taking prescription Narcotics:  · Limit your activities  · Do not drive and operate hazardous machinery  · Do not make important personal or business decisions  · Do  not drink alcoholic beverages  · If you have not urinated within 8 hours after discharge, please contact your surgeon on call.       What to do at Home:  Recommended activity: Activity as tolerated, Oxygen @    If you experience any of the following symptoms worsening cough or wheezing, shortness of breath or fatigue not relieved with rest, unrelieved pain, nausea or vomiting please follow up with MD.    *  Please give a list of your current medications to your Primary Care Provider. *  Please update this list whenever your medications are discontinued, doses are      changed, or new medications (including over-the-counter products) are added. *  Please carry medication information at all times in case of emergency situations. These are general instructions for a healthy lifestyle:    No smoking/ No tobacco products/ Avoid exposure to second hand smoke  Surgeon General's Warning:  Quitting smoking now greatly reduces serious risk to your health. Obesity, smoking, and sedentary lifestyle greatly increases your risk for illness    A healthy diet, regular physical exercise & weight monitoring are important for maintaining a healthy lifestyle    You may be retaining fluid if you have a history of heart failure or if you experience any of the following symptoms:  Weight gain of 3 pounds or more overnight or 5 pounds in a week, increased swelling in our hands or feet or shortness of breath while lying flat in bed. Please call your doctor as soon as you notice any of these symptoms; do not wait until your next office visit. The discharge information has been reviewed with the patient. The patient verbalized understanding. Discharge medications reviewed with the patient and appropriate educational materials and side effects teaching were provided. Advance Care Planning  People with COVID-19 may have no symptoms, mild symptoms, such as fever, cough, and shortness of breath or they may have more severe illness, developing severe and fatal pneumonia. As a result, Advance Care Planning with attention to naming a health care decision maker (someone you trust to make healthcare decisions for you if you could not speak for yourself) and sharing other health care preferences is important BEFORE a possible health crisis.  Please contact your Primary Care Provider to discuss Advance Care Planning. Preventing the Spread of Coronavirus Disease 2019 in Homes and Residential Communities  For the most recent information go to [email or web address]    Prevention steps for People with confirmed or suspected COVID-19 (including persons under investigation) who do not need to be hospitalized  and   People with confirmed COVID-19 who were hospitalized and determined to be medically stable to go home    Your healthcare provider and public health staff will evaluate whether you can be cared for at home. If it is determined that you do not need to be hospitalized and can be isolated at home, you will be monitored by staff from your local or state health department. You should follow the prevention steps below until a healthcare provider or local or state health department says you can return to your normal activities. Stay home except to get medical care  People who are mildly ill with COVID-19 are able to isolate at home during their illness. You should restrict activities outside your home, except for getting medical care. Do not go to work, school, or public areas. Avoid using public transportation, ride-sharing, or taxis. Separate yourself from other people and animals in your home  People: As much as possible, you should stay in a specific room and away from other people in your home. Also, you should use a separate bathroom, if available. Animals: You should restrict contact with pets and other animals while you are sick with COVID-19, just like you would around other people. Although there have not been reports of pets or other animals becoming sick with COVID-19, it is still recommended that people sick with COVID-19 limit contact with animals until more information is known about the virus. When possible, have another member of your household care for your animals while you are sick.  If you are sick with COVID-19, avoid contact with your pet, including petting, snuggling, being kissed or licked, and sharing food. If you must care for your pet or be around animals while you are sick, wash your hands before and after you interact with pets and wear a facemask. Call ahead before visiting your doctor  If you have a medical appointment, call the healthcare provider and tell them that you have or may have COVID-19. This will help the healthcare providers office take steps to keep other people from getting infected or exposed. Wear a facemask  You should wear a facemask when you are around other people (e.g., sharing a room or vehicle) or pets and before you enter a healthcare providers office. If you are not able to wear a facemask (for example, because it causes trouble breathing), then people who live with you should not stay in the same room with you, or they should wear a facemask if they enter your room. Cover your coughs and sneezes  Cover your mouth and nose with a tissue when you cough or sneeze. Throw used tissues in a lined trash can. Immediately wash your hands with soap and water for at least 20 seconds or, if soap and water are not available, clean your hands with an alcohol-based hand  that contains at least 60% alcohol. Clean your hands often  Wash your hands often with soap and water for at least 20 seconds, especially after blowing your nose, coughing, or sneezing; going to the bathroom; and before eating or preparing food. If soap and water are not readily available, use an alcohol-based hand  with at least 60% alcohol, covering all surfaces of your hands and rubbing them together until they feel dry. Soap and water are the best option if hands are visibly dirty. Avoid touching your eyes, nose, and mouth with unwashed hands.   Avoid sharing personal household items  You should not share dishes, drinking glasses, cups, eating utensils, towels, or bedding with other people or pets in your home. After using these items, they should be washed thoroughly with soap and water. Clean all high-touch surfaces everyday  High touch surfaces include counters, tabletops, doorknobs, bathroom fixtures, toilets, phones, keyboards, tablets, and bedside tables. Also, clean any surfaces that may have blood, stool, or body fluids on them. Use a household cleaning spray or wipe, according to the label instructions. Labels contain instructions for safe and effective use of the cleaning product including precautions you should take when applying the product, such as wearing gloves and making sure you have good ventilation during use of the product. Monitor your symptoms  Seek prompt medical attention if your illness is worsening (e.g., difficulty breathing). Before seeking care, call your healthcare provider and tell them that you have, or are being evaluated for, COVID-19. Put on a facemask before you enter the facility. These steps will help the healthcare providers office to keep other people in the office or waiting room from getting infected or exposed. Ask your healthcare provider to call the local or [facility] health department. Persons who are placed under active monitoring or facilitated self-monitoring should follow instructions provided by their local health department or occupational health professionals, as appropriate. When working with your local health department check their available hours. If you have a medical emergency and need to call 911, notify the dispatch personnel that you have, or are being evaluated for COVID-19. If possible, put on a facemask before emergency medical services arrive. Discontinuing home isolation  Patients with confirmed COVID-19 should remain under home isolation precautions until the risk of secondary transmission to others is thought to be low.  The decision to discontinue home isolation precautions should be made on a case-by-case basis, in consultation with healthcare providers and state and local health departments. Patient Education        Pneumonia: Care Instructions  Overview     Pneumonia is an infection of the lungs. Most cases are caused by infections from bacteria or viruses. Pneumonia may be mild or very severe. If it is caused by bacteria, you will be treated with antibiotics. It may take a few weeks to a few months to recover fully from pneumonia, depending on how sick you were and whether your overall health is good. Follow-up care is a key part of your treatment and safety. Be sure to make and go to all appointments, and call your doctor if you are having problems. It's also a good idea to know your test results and keep a list of the medicines you take. How can you care for yourself at home? · Take your antibiotics exactly as directed. Do not stop taking the medicine just because you are feeling better. You need to take the full course of antibiotics. · Take your medicines exactly as prescribed. Call your doctor if you think you are having a problem with your medicine. · Get plenty of rest and sleep. You may feel weak and tired for a while, but your energy level will improve with time. · To prevent dehydration, drink plenty of fluids. Choose water and other clear liquids. If you have kidney, heart, or liver disease and have to limit fluids, talk with your doctor before you increase the amount of fluids you drink. · Take care of your cough so you can rest. A cough that brings up mucus from your lungs is common with pneumonia. It is one way your body gets rid of the infection. But if coughing keeps you from resting or causes severe fatigue and chest-wall pain, talk to your doctor. Your doctor may suggest that you take a medicine to reduce the cough. · Use a vaporizer or humidifier to add moisture to your bedroom. Follow the directions for cleaning the machine. · Do not smoke or allow others to smoke around you.  Smoke will make your cough last longer. If you need help quitting, talk to your doctor about stop-smoking programs and medicines. These can increase your chances of quitting for good. · Take an over-the-counter pain medicine, such as acetaminophen (Tylenol), ibuprofen (Advil, Motrin), or naproxen (Aleve). Read and follow all instructions on the label. · Do not take two or more pain medicines at the same time unless the doctor told you to. Many pain medicines have acetaminophen, which is Tylenol. Too much acetaminophen (Tylenol) can be harmful. · If you were given a spirometer to measure how well your lungs are working, use it as instructed. This can help your doctor tell how your recovery is going. · To prevent pneumonia in the future, talk to your doctor about getting a flu vaccine (once a year) and a pneumococcal vaccine (one time only for most people). When should you call for help? Call 911 anytime you think you may need emergency care. For example, call if:    · You have severe trouble breathing. Call your doctor now or seek immediate medical care if:    · You cough up dark brown or bloody mucus (sputum).     · You have new or worse trouble breathing.     · You are dizzy or lightheaded, or you feel like you may faint. Watch closely for changes in your health, and be sure to contact your doctor if:    · You have a new or higher fever.     · You are coughing more deeply or more often.     · You are not getting better after 2 days (48 hours).     · You do not get better as expected. Where can you learn more? Go to http://www.[employer].com/  Enter D336 in the search box to learn more about \"Pneumonia: Care Instructions. \"  Current as of: July 6, 2021               Content Version: 13.0  © 7527-9769 [employer]. Care instructions adapted under license by [employer] (which disclaims liability or warranty for this information).  If you have questions about a medical condition or this instruction, always ask your healthcare professional. [place] [address] any warranty or liability for your use of this information. Patient Education        Learning About Hypoxemia  What is hypoxemia? Hypoxemia means that you don't have enough oxygen in your blood. It's a result of diseases that affect your heart or lungs. These include heart failure, COPD, and pulmonary fibrosis (scarring of the lungs). Being at high altitudes can also lead to hypoxemia. What happens when you have hypoxemia? Oxygen gets into your blood through your lungs. Your blood carries the oxygen to all parts of your body. When you have too little oxygen in your blood, your body doesn't get enough of it. With too little oxygen, your heart and other parts of your body don't work very well. What are the symptoms? In addition to the symptoms of whatever is causing your hypoxemia, you may:  · Get tired quickly. · Be short of breath when you are active. · Feel like your heart is pounding or racing. · Feel weak or dizzy. · Become confused. How is hypoxemia treated? Your doctor will do tests to find out how much oxygen is in your blood. He or she will look for the cause of your hypoxemia and treat that problem. For example, if you have heart failure, you may need medicines that help your heart pump better. · If your hypoxemia is not severe, your doctor may give you oxygen through a mask or nasal cannula (say \"LIZZETTE-yuh-divine\"). A cannula is a thin tube with two openings that fit just inside your nose. · If your hypoxemia is severe, you may have a breathing tube put into your windpipe. The breathing tube is attached to a machine that pushes air into your lungs. This machine is called a ventilator. · If you have a long-term problem with hypoxemia, your doctor may recommend that you use oxygen regularly. Some people need it all the time. Others need it from time to time throughout the day or overnight.  Your doctor will tell you how much oxygen you need and how often to use it. Follow-up care is a key part of your treatment and safety. Be sure to make and go to all appointments, and call your doctor if you are having problems. It's also a good idea to know your test results and keep a list of the medicines you take. Where can you learn more? Go to http://www.gray.com/  Enter M375 in the search box to learn more about \"Learning About Hypoxemia. \"  Current as of: July 6, 2021               Content Version: 13.0  © 2006-2021 [employer]. Care instructions adapted under license by [employer] (which disclaims liability or warranty for this information). If you have questions about a medical condition or this instruction, always ask your healthcare professional. [address] any warranty or liability for your use of this information. ___________________________________________________________________________________________________________________________________      Patient Education        Coronavirus ([id number]): Care Instructions  Overview  The coronavirus disease (COVID-19) is caused by a virus. Symptoms may include a fever, a cough, and shortness of breath. It can spread through droplets from coughing and sneezing, breathing, and singing. The virus also can spread when people are in close contact with someone who is infected. Most people have mild symptoms and can take care of themselves at home. If their symptoms get worse, they may need care in a hospital. Treatment may include medicines to reduce symptoms, plus breathing support such as oxygen therapy or a ventilator. It's important to not spread the virus to others. If you have COVID-19, wear a mask anytime you are around other people. It can help stop the spread of the virus. You need to isolate yourself while you are sick.  Leave your home only if you need to get medical care or testing. Follow-up care is a key part of your treatment and safety. Be sure to make and go to all appointments, and call your doctor if you are having problems. It's also a good idea to know your test results and keep a list of the medicines you take. How can you care for yourself at home? · Get extra rest. It can help you feel better. · Drink plenty of fluids. This helps replace fluids lost from fever. Fluids may also help ease a scratchy throat. · You can take acetaminophen (Tylenol) or ibuprofen (Advil, Motrin) to reduce a fever. It may also help with muscle and body aches. Read and follow all instructions on the label. · Use petroleum jelly on sore skin. This can help if the skin around your nose and lips becomes sore from rubbing a lot with tissues. If you use oxygen, use a water-based product instead of petroleum jelly. · Keep track of symptoms such as fever and shortness of breath. This can help you know if you need to call your doctor. It can also help you know when it's safe to be around other people. · In some cases, your doctor might suggest that you get a pulse oximeter. How can you self-isolate when you have COVID-19? If you have COVID-19, there are things you can do to help avoid spreading the virus to others. · Limit contact with people in your home. If possible, stay in a separate bedroom and use a separate bathroom. · Wear a mask when you are around other people. · If you have to leave home, avoid crowds and try to stay at least 6 feet away from other people. · Avoid contact with pets and other animals. · Cover your mouth and nose with a tissue when you cough or sneeze. Then throw it in the trash right away. · Wash your hands often, especially after you cough or sneeze. Use soap and water, and scrub for at least 20 seconds. If soap and water aren't available, use an alcohol-based hand . · Don't share personal household items.  These include bedding, towels, cups and glasses, and eating utensils. · [address] in the warmest water allowed for the fabric type, and dry it completely. It's okay to wash other people's laundry with yours. · Clean and disinfect your home. Use household  and disinfectant wipes or sprays. When can you end self-isolation for COVID-19? If you know or think that you have the virus, you will need to self-isolate. You can be around others after:  · It's been at least 10 days since your symptoms started and  · You haven't had a fever for 24 hours without taking medicines to lower the fever and  · Your symptoms are improving. If you tested positive but have no symptoms, you can end isolation after 10 days. But if you start to have symptoms, follow the steps above. Ask your doctor if you need to be tested before you end isolation. This is especially important if you have a weakened immune system. When should you call for help? Call 911 anytime you think you may need emergency care. For example, call if you have life-threatening symptoms, such as:    · You have severe trouble breathing. (You can't talk at all.)     · You have constant chest pain or pressure.     · You are severely dizzy or lightheaded.     · You are confused or can't think clearly.     · You have pale, gray, or blue-colored skin or lips.     · You pass out (lose consciousness) or are very hard to wake up. Call your doctor now or seek immediate medical care if:    · You have moderate trouble breathing. (You can't speak a full sentence.)     · You are coughing up blood (more than about 1 teaspoon).     · You have signs of low blood pressure. These include feeling lightheaded; being too weak to stand; and having cold, pale, clammy skin. Watch closely for changes in your health, and be sure to contact your doctor if:    · Your symptoms get worse.     · You are not getting better as expected.     · You have new or worse symptoms of anxiety, depression, nightmares, or flashbacks.    Call before you go to the doctor's office. Follow their instructions. And wear a mask. Current as of: July 1, 2021               Content Version: 13.0  © 2006-2021 [employer]. Care instructions adapted under license by [employer] (which disclaims liability or warranty for this information). If you have questions about a medical condition or this instruction, always ask your healthcare professional. [address] any warranty or liability for your use of this information. Patient Education        [address] After Treatment for COVID-19: Care Instructions  Overview     You are being sent home from the hospital after being treated for COVID-19. Being in the hospital can be hard, especially if you've been in the intensive care unit (ICU). Even though you're going home, you probably don't feel well yet. Healing from COVID-19 takes time. You may feel very tired for weeks or months afterward, especially if you were on a ventilator. It will take time to get back to your old level of activity. Some people may have long-lasting health problems. But most people can look forward to feeling a little better every day. If you were on a ventilator, your throat may be sore and your voice hoarse or raspy for a while. After leaving the hospital, some people have feelings of anxiety and depression. They may have nightmares. Or in their mind they may relive events that happened in the hospital (flashbacks). Reach out to your doctor if you're having trouble with these symptoms. Your doctor will tell you if you need to isolate yourself at home, and when you can end isolation. How can you self-isolate when you have COVID-19? If you have COVID-19, there are things you can do to help avoid spreading the virus to others. · Limit contact with people in your home. If possible, stay in a separate bedroom and use a separate bathroom.   · Wear a mask when you are around other people. · If you have to leave home, avoid crowds and try to stay at least 6 feet away from other people. · Avoid contact with pets and other animals. · Cover your mouth and nose with a tissue when you cough or sneeze. Then throw it in the trash right away. · Wash your hands often, especially after you cough or sneeze. Use soap and water, and scrub for at least 20 seconds. If soap and water aren't available, use an alcohol-based hand . · Don't share personal household items. These include bedding, towels, cups and glasses, and eating utensils. · [address] in the warmest water allowed for the fabric type, and dry it completely. It's okay to wash other people's laundry with yours. · Clean and disinfect your home. Use household  and disinfectant wipes or sprays. Follow-up care is a key part of your treatment and safety. Be sure to make and go to all appointments, and call your doctor if you are having problems. It's also a good idea to know your test results and keep a list of the medicines you take. How can you care for yourself at home? · Get plenty of rest. It can help you feel better. · Be kind to yourself if it's taking longer than you expected to feel better. You've been through a stressful time. · Get up and walk around every hour or two while you're resting. Slowly increase your activity as you start to feel better. · Eat healthy foods. · Drink plenty of fluids. If you have kidney, heart, or liver disease and have to limit fluids, talk with your doctor before you increase the amount of fluids you drink. · If needed, take acetaminophen (Tylenol) or ibuprofen (Advil, Motrin) to reduce a fever. It may also help with muscle aches. Read and follow all instructions on the label. When should you call for help? Call 911 anytime you think you may need emergency care. For example, call if you have life-threatening symptoms, such as:    · You have severe trouble breathing.  (You can't talk at all.)     · You have constant chest pain or pressure.     · You are severely dizzy or lightheaded.     · You are confused or can't think clearly.     · You have pale, gray, or blue-colored skin or lips.     · You pass out (lose consciousness) or are very hard to wake up. Call your doctor now or seek immediate medical care if:    · You have moderate trouble breathing. (You can't speak a full sentence.)     · You are coughing up blood (more than about 1 teaspoon).     · You have signs of low blood pressure. These include feeling lightheaded; being too weak to stand; and having cold, pale, clammy skin. Watch closely for changes in your health, and be sure to contact your doctor if:    · Your symptoms get worse.     · You are not getting better as expected.     · You have new or worse symptoms of anxiety, depression, nightmares, or flashbacks. Call before you go to the doctor's office. Follow their instructions. And wear a mask. Current as of: July 1, 2021               Content Version: 13.0  © 2006-2021 [employer]. Care instructions adapted under license by [employer] (which disclaims liability or warranty for this information). If you have questions about a medical condition or this instruction, always ask your healthcare professional. [address] any warranty or liability for your use of this information. Patient Education        Learning About Rate-Control Medicines  Introduction    Rate-control medicines are used if your heart beats too fast. These drugs can slow down your heart rate. They can also ease the symptoms of a fast heart rate. When your heart beats too fast, you may feel dizzy or pass out. You may have chest pain. Also, your heart may race or pound. These drugs can be used to treat problems such as atrial fibrillation. There are three common types of these drugs.  They are beta-blockers, calcium channel blockers, and digoxin. Examples  Beta-blockers  · Atenolol (Tenormin)  · Carvedilol (Coreg)  · Metoprolol (Lopressor, Toprol)  · Propranolol (Inderal)  Calcium channel blockers  · Diltiazem (Cardizem, Taztia)  · Verapamil (Calan, Verelan)  Digoxin  · Digoxin (Lanoxin)  Possible side effects  Common side effects of beta-blockers include:  · Feeling dizzy or lightheaded. · Feeling tired. · Trouble sleeping. Common side effects of calcium channel blockers include:  · Stomach problems. You may be constipated. · Swollen legs. You may have other side effects or reactions not listed here. Check the information that comes with your medicine. What to know about taking this medicine  · Too much digoxin can poison you. This happens when there is too much of the drug in your body. Call your doctor if:  ? You lose your appetite. ? You have stomach problems. You may feel sick to your stomach. Or you may vomit or have diarrhea.  ? Your vision changes. ? You are confused. ? Your heartbeat is not normal.  ? You passed out. · Take your medicines exactly as prescribed. Call your doctor if you think you are having a problem with your medicine. · Check with your doctor or pharmacist before you use any other medicines. These include over-the-counter medicines. Make sure your doctor knows all of the medicines, vitamins, herbs, and supplements you take. Taking some medicines together can cause problems. Where can you learn more? Go to http://www.gray.com/  Enter G730 in the search box to learn more about \"Learning About Rate-Control Medicines. \"  Current as of: April 29, 2021               Content Version: 13.0  © 2336-1264 [employer]. Care instructions adapted under license by [employer] (which disclaims liability or warranty for this information).  If you have questions about a medical condition or this instruction, always ask your healthcare professional. [employer] disclaims any warranty or liability for your use of this information.

## 2021-10-05 NOTE — PROGRESS NOTES
Discharge instructions reviewed with pt verbalized understanding of all instructions and home medications. All prescriptions sent to home with pt. Pt sent home with all personal belongings. Patient d/c on 3 L NC oxygen with o2 tank. pt to f/u with PCP tomorrow for new pt appointment. All education and d/c instructions completed. No issues, questions or concerns verbalized at this time.

## 2021-10-05 NOTE — PROGRESS NOTES
Oxygen Qualifier       Room air: SpO2 with O2 and liter flow   Resting SpO2  91%     Ambulating SpO2  91% 84% on 1L, 86 on 2lpm, 91 on 3lpm       Completed by:    Destiny Dominguez RT

## 2021-10-08 LAB
CREAT UR-MCNC: 87.1 MG/DL
DOPAMINE UG/G CREAT: 84 UG/G CREAT (ref 0–348)
DOPAMINE UR-MCNC: 73 UG/L
EPINEPH UR-MCNC: 3 UG/L
EPINEPHRINE UG/G CREAT: 3 UG/G CREAT (ref 0–19)
NOREPINEPH UR-MCNC: 48 UG/L
NOREPINEPHRINE UG/G CREAT: 55 UG/G CREAT (ref 0–111)

## 2021-11-05 NOTE — ADT AUTH CERT NOTES
Venkata Bingham REF# [id number]    PATIENT   Antonia Rodríguez   - 1975    ADDRESS:   [facility] [address]  [facility] [id number]      CAN YOU PLEASE ADJUST THIS ADMISSION DATE TO 2021? PATIENT ADMITTED ON 2021 AND WAS DISCHARGED ON 10/05/2021- Kostas Harris!     UR CONTACT - BC HAYDEN   I CAN BE REACHED [phone]    PLEASE FAX BACK ADJUSTED AUTH STATUS TO ME @ [phone]- THANK YOU SO MUCH!

## 2021-11-23 PROBLEM — R06.09 DYSPNEA ON EXERTION: Status: ACTIVE | Noted: 2021-11-23

## 2021-11-23 PROBLEM — U09.9 POST-ACUTE SEQUELAE OF COVID-19 (PASC): Status: ACTIVE | Noted: 2021-11-23

## 2022-01-21 ENCOUNTER — HOSPITAL ENCOUNTER (OUTPATIENT)
Dept: GENERAL RADIOLOGY | Age: 47
Discharge: HOME | End: 2022-01-21
Payer: COMMERCIAL

## 2022-01-21 DIAGNOSIS — J12.82 PNEUMONIA DUE TO COVID-19 VIRUS: ICD-10-CM

## 2022-01-21 DIAGNOSIS — U07.1 PNEUMONIA DUE TO COVID-19 VIRUS: ICD-10-CM

## 2022-01-21 DIAGNOSIS — J96.01 ACUTE RESPIRATORY FAILURE WITH HYPOXIA (HCC): ICD-10-CM

## 2022-01-21 PROBLEM — J98.4 RESTRICTIVE LUNG DISEASE: Status: ACTIVE | Noted: 2022-01-21

## 2022-01-21 PROBLEM — R91.8 PULMONARY INFILTRATES: Status: ACTIVE | Noted: 2022-01-21

## 2022-01-21 PROBLEM — R09.89 BIBASILAR CRACKLES: Status: ACTIVE | Noted: 2022-01-21

## 2022-01-21 PROBLEM — E66.9 OBESITY (BMI 30.0-34.9): Status: ACTIVE | Noted: 2022-01-21

## 2022-01-21 PROBLEM — J98.4 RESTRICTIVE LUNG DISEASE: Chronic | Status: ACTIVE | Noted: 2022-01-21

## 2022-01-21 PROBLEM — F17.211 CIGARETTE NICOTINE DEPENDENCE IN REMISSION: Status: ACTIVE | Noted: 2022-01-21

## 2022-01-21 PROBLEM — G47.9 DIFFICULTY SLEEPING: Status: RESOLVED | Noted: 2021-09-28 | Resolved: 2022-01-21

## 2022-01-21 PROBLEM — R91.8 PULMONARY INFILTRATES: Chronic | Status: ACTIVE | Noted: 2022-01-21

## 2022-01-21 PROBLEM — Z87.891 HISTORY OF NICOTINE VAPING: Status: ACTIVE | Noted: 2022-01-21

## 2022-01-21 PROBLEM — A41.89 SEPSIS DUE TO COVID-19 (HCC): Status: RESOLVED | Noted: 2021-09-27 | Resolved: 2022-01-21

## 2022-01-21 PROBLEM — R09.89 BIBASILAR CRACKLES: Chronic | Status: ACTIVE | Noted: 2022-01-21

## 2022-01-21 PROBLEM — R06.09 DYSPNEA ON EXERTION: Status: RESOLVED | Noted: 2021-11-23 | Resolved: 2022-01-21

## 2022-01-21 PROCEDURE — 71046 X-RAY EXAM CHEST 2 VIEWS: CPT

## 2022-03-02 ENCOUNTER — HOSPITAL ENCOUNTER (OUTPATIENT)
Dept: ULTRASOUND IMAGING | Age: 47
Discharge: HOME | End: 2022-03-02
Attending: NURSE PRACTITIONER
Payer: COMMERCIAL

## 2022-03-02 DIAGNOSIS — N50.89 TESTICULAR SWELLING, RIGHT: ICD-10-CM

## 2022-03-02 PROCEDURE — 76870 US EXAM SCROTUM: CPT

## 2022-03-03 NOTE — PROGRESS NOTES
U/S revealed IMPRESSION     1. Small bilateral mildly complicated hydroceles.     2. Subcutaneous soft tissue edema superior to the right testicle.     3. Essentially unremarkable appearance of the testicles and epididymis  Bilaterally.     Will refer to urology

## 2022-03-18 PROBLEM — E66.9 OBESITY (BMI 30.0-34.9): Status: ACTIVE | Noted: 2022-01-21

## 2022-03-18 PROBLEM — D72.829 LEUKOCYTOSIS: Status: ACTIVE | Noted: 2021-09-29

## 2022-03-18 PROBLEM — E66.811 OBESITY (BMI 30.0-34.9): Status: ACTIVE | Noted: 2022-01-21

## 2022-03-19 PROBLEM — I47.11 INAPPROPRIATE SINUS TACHYCARDIA: Status: ACTIVE | Noted: 2021-09-28

## 2022-03-19 PROBLEM — R19.7 DIARRHEA: Status: ACTIVE | Noted: 2021-09-29

## 2022-03-19 PROBLEM — R00.0 INAPPROPRIATE SINUS TACHYCARDIA: Status: ACTIVE | Noted: 2021-09-28

## 2022-03-19 PROBLEM — J98.4 RESTRICTIVE LUNG DISEASE: Status: ACTIVE | Noted: 2022-01-21

## 2022-03-19 PROBLEM — R91.8 PULMONARY INFILTRATES: Status: ACTIVE | Noted: 2022-01-21

## 2022-03-19 PROBLEM — F17.211 CIGARETTE NICOTINE DEPENDENCE IN REMISSION: Status: ACTIVE | Noted: 2022-01-21

## 2022-03-20 PROBLEM — R09.89 BIBASILAR CRACKLES: Status: ACTIVE | Noted: 2022-01-21

## 2022-03-20 PROBLEM — U09.9 POST COVID-19 CONDITION, UNSPECIFIED: Status: ACTIVE | Noted: 2021-11-23

## 2022-03-20 PROBLEM — Z87.891 HISTORY OF NICOTINE VAPING: Status: ACTIVE | Noted: 2022-01-21

## 2022-03-22 ENCOUNTER — HOSPITAL ENCOUNTER (OUTPATIENT)
Dept: CT IMAGING | Age: 47
Discharge: HOME | End: 2022-03-22
Attending: UROLOGY
Payer: COMMERCIAL

## 2022-03-22 DIAGNOSIS — K40.90 RIGHT INGUINAL HERNIA: ICD-10-CM

## 2022-03-22 PROCEDURE — 74011000636 HC RX REV CODE- 636: Performed by: UROLOGY

## 2022-03-22 PROCEDURE — 74177 CT ABD & PELVIS W/CONTRAST: CPT

## 2022-03-22 PROCEDURE — 74011000258 HC RX REV CODE- 258: Performed by: UROLOGY

## 2022-03-22 RX ORDER — SODIUM CHLORIDE 0.9 % (FLUSH) 0.9 %
10 SYRINGE (ML) INJECTION
Status: COMPLETED | OUTPATIENT
Start: 2022-03-22 | End: 2022-03-22

## 2022-03-22 RX ADMIN — DIATRIZOATE MEGLUMINE AND DIATRIZOATE SODIUM 15 ML: 660; 100 LIQUID ORAL; RECTAL at 15:35

## 2022-03-22 RX ADMIN — IOPAMIDOL 100 ML: 755 INJECTION, SOLUTION INTRAVENOUS at 15:34

## 2022-03-22 RX ADMIN — SODIUM CHLORIDE 100 ML: 9 INJECTION, SOLUTION INTRAVENOUS at 15:35

## 2022-03-22 RX ADMIN — Medication 10 ML: at 15:35

## 2022-03-28 NOTE — PROGRESS NOTES
Mr. Laurel Rangel, your CT scan shows a right inguinal hernia as the main cause for your scrotal swelling. I will refer you to general surgery to discuss repair, as we discussed at our last visit. Please let me know if you have any questions / concerns.      Carol Benitez,  Dr. Ruperto Cole

## 2022-05-19 ENCOUNTER — ANESTHESIA EVENT (OUTPATIENT)
Dept: SURGERY | Age: 47
End: 2022-05-19
Payer: COMMERCIAL

## 2022-05-20 ENCOUNTER — ANESTHESIA (OUTPATIENT)
Dept: SURGERY | Age: 47
End: 2022-05-20
Payer: COMMERCIAL

## 2022-05-20 ENCOUNTER — HOSPITAL ENCOUNTER (OUTPATIENT)
Age: 47
Setting detail: OUTPATIENT SURGERY
Discharge: HOME | End: 2022-05-20
Attending: STUDENT IN AN ORGANIZED HEALTH CARE EDUCATION/TRAINING PROGRAM | Admitting: STUDENT IN AN ORGANIZED HEALTH CARE EDUCATION/TRAINING PROGRAM
Payer: COMMERCIAL

## 2022-05-20 VITALS
RESPIRATION RATE: 16 BRPM | SYSTOLIC BLOOD PRESSURE: 105 MMHG | HEIGHT: 68 IN | OXYGEN SATURATION: 98 % | HEART RATE: 68 BPM | WEIGHT: 225.8 LBS | TEMPERATURE: 98 F | BODY MASS INDEX: 34.22 KG/M2 | DIASTOLIC BLOOD PRESSURE: 55 MMHG

## 2022-05-20 DIAGNOSIS — K40.90 RIGHT INGUINAL HERNIA: Primary | ICD-10-CM

## 2022-05-20 PROCEDURE — 74011250637 HC RX REV CODE- 250/637: Performed by: ANESTHESIOLOGY

## 2022-05-20 PROCEDURE — 77030010299 HC STPLR INT COVD -E: Performed by: STUDENT IN AN ORGANIZED HEALTH CARE EDUCATION/TRAINING PROGRAM

## 2022-05-20 PROCEDURE — 2709999900 HC NON-CHARGEABLE SUPPLY: Performed by: STUDENT IN AN ORGANIZED HEALTH CARE EDUCATION/TRAINING PROGRAM

## 2022-05-20 PROCEDURE — 77030008606 HC TRCR ENDOSC KII AMR -B: Performed by: STUDENT IN AN ORGANIZED HEALTH CARE EDUCATION/TRAINING PROGRAM

## 2022-05-20 PROCEDURE — 74011250636 HC RX REV CODE- 250/636: Performed by: STUDENT IN AN ORGANIZED HEALTH CARE EDUCATION/TRAINING PROGRAM

## 2022-05-20 PROCEDURE — 74011250636 HC RX REV CODE- 250/636: Performed by: REGISTERED NURSE

## 2022-05-20 PROCEDURE — 77030040922 HC BLNKT HYPOTHRM STRY -A: Performed by: REGISTERED NURSE

## 2022-05-20 PROCEDURE — 77030008518 HC TBNG INSUF ENDO STRY -B: Performed by: STUDENT IN AN ORGANIZED HEALTH CARE EDUCATION/TRAINING PROGRAM

## 2022-05-20 PROCEDURE — C1781 MESH (IMPLANTABLE): HCPCS | Performed by: STUDENT IN AN ORGANIZED HEALTH CARE EDUCATION/TRAINING PROGRAM

## 2022-05-20 PROCEDURE — 76210000006 HC OR PH I REC 0.5 TO 1 HR: Performed by: STUDENT IN AN ORGANIZED HEALTH CARE EDUCATION/TRAINING PROGRAM

## 2022-05-20 PROCEDURE — 77030019908 HC STETH ESOPH SIMS -A: Performed by: REGISTERED NURSE

## 2022-05-20 PROCEDURE — 74011000250 HC RX REV CODE- 250: Performed by: REGISTERED NURSE

## 2022-05-20 PROCEDURE — 77030039425 HC BLD LARYNG TRULITE DISP TELE -A: Performed by: REGISTERED NURSE

## 2022-05-20 PROCEDURE — 77030010513 HC APPL CLP LIG J&J -C: Performed by: STUDENT IN AN ORGANIZED HEALTH CARE EDUCATION/TRAINING PROGRAM

## 2022-05-20 PROCEDURE — C1713 ANCHOR/SCREW BN/BN,TIS/BN: HCPCS | Performed by: STUDENT IN AN ORGANIZED HEALTH CARE EDUCATION/TRAINING PROGRAM

## 2022-05-20 PROCEDURE — 77030037088 HC TUBE ENDOTRACH ORAL NSL COVD-A: Performed by: REGISTERED NURSE

## 2022-05-20 PROCEDURE — 76210000020 HC REC RM PH II FIRST 0.5 HR: Performed by: STUDENT IN AN ORGANIZED HEALTH CARE EDUCATION/TRAINING PROGRAM

## 2022-05-20 PROCEDURE — 76010000162 HC OR TIME 1.5 TO 2 HR INTENSV-TIER 1: Performed by: STUDENT IN AN ORGANIZED HEALTH CARE EDUCATION/TRAINING PROGRAM

## 2022-05-20 PROCEDURE — 77030000038 HC TIP SCIS LAPSCP SURI -B: Performed by: STUDENT IN AN ORGANIZED HEALTH CARE EDUCATION/TRAINING PROGRAM

## 2022-05-20 PROCEDURE — 77030031139 HC SUT VCRL2 J&J -A: Performed by: STUDENT IN AN ORGANIZED HEALTH CARE EDUCATION/TRAINING PROGRAM

## 2022-05-20 PROCEDURE — 77030012799 HC TRCR GELPRT BLN AMR -B: Performed by: STUDENT IN AN ORGANIZED HEALTH CARE EDUCATION/TRAINING PROGRAM

## 2022-05-20 PROCEDURE — 74011250636 HC RX REV CODE- 250/636: Performed by: ANESTHESIOLOGY

## 2022-05-20 PROCEDURE — 74011000250 HC RX REV CODE- 250: Performed by: STUDENT IN AN ORGANIZED HEALTH CARE EDUCATION/TRAINING PROGRAM

## 2022-05-20 PROCEDURE — 77030020829: Performed by: STUDENT IN AN ORGANIZED HEALTH CARE EDUCATION/TRAINING PROGRAM

## 2022-05-20 PROCEDURE — 74011250636 HC RX REV CODE- 250/636: Performed by: NURSE ANESTHETIST, CERTIFIED REGISTERED

## 2022-05-20 PROCEDURE — 77030002933 HC SUT MCRYL J&J -A: Performed by: STUDENT IN AN ORGANIZED HEALTH CARE EDUCATION/TRAINING PROGRAM

## 2022-05-20 PROCEDURE — 76060000034 HC ANESTHESIA 1.5 TO 2 HR: Performed by: STUDENT IN AN ORGANIZED HEALTH CARE EDUCATION/TRAINING PROGRAM

## 2022-05-20 PROCEDURE — 49650 LAP ING HERNIA REPAIR INIT: CPT | Performed by: STUDENT IN AN ORGANIZED HEALTH CARE EDUCATION/TRAINING PROGRAM

## 2022-05-20 PROCEDURE — C1727 CATH, BAL TIS DIS, NON-VAS: HCPCS | Performed by: STUDENT IN AN ORGANIZED HEALTH CARE EDUCATION/TRAINING PROGRAM

## 2022-05-20 DEVICE — MESH HERN L W10.8XL16CM R INGUINAL WHT POLYPR MFIL: Type: IMPLANTABLE DEVICE | Site: INGUINAL | Status: FUNCTIONAL

## 2022-05-20 RX ORDER — METOPROLOL TARTRATE 25 MG/1
25 TABLET, FILM COATED ORAL ONCE
Status: COMPLETED | OUTPATIENT
Start: 2022-05-20 | End: 2022-05-20

## 2022-05-20 RX ORDER — HYDROMORPHONE HYDROCHLORIDE 2 MG/ML
0.5 INJECTION, SOLUTION INTRAMUSCULAR; INTRAVENOUS; SUBCUTANEOUS
Status: COMPLETED | OUTPATIENT
Start: 2022-05-20 | End: 2022-05-20

## 2022-05-20 RX ORDER — DOCUSATE SODIUM 100 MG/1
100 CAPSULE, LIQUID FILLED ORAL 2 TIMES DAILY
Qty: 60 CAPSULE | Refills: 0 | Status: SHIPPED | OUTPATIENT
Start: 2022-05-20 | End: 2022-06-19

## 2022-05-20 RX ORDER — KETOROLAC TROMETHAMINE 30 MG/ML
INJECTION, SOLUTION INTRAMUSCULAR; INTRAVENOUS AS NEEDED
Status: DISCONTINUED | OUTPATIENT
Start: 2022-05-20 | End: 2022-05-20 | Stop reason: HOSPADM

## 2022-05-20 RX ORDER — EPHEDRINE SULFATE/0.9% NACL/PF 50 MG/5 ML
SYRINGE (ML) INTRAVENOUS AS NEEDED
Status: DISCONTINUED | OUTPATIENT
Start: 2022-05-20 | End: 2022-05-20 | Stop reason: HOSPADM

## 2022-05-20 RX ORDER — DIPHENHYDRAMINE HYDROCHLORIDE 50 MG/ML
12.5 INJECTION, SOLUTION INTRAMUSCULAR; INTRAVENOUS
Status: DISCONTINUED | OUTPATIENT
Start: 2022-05-20 | End: 2022-05-20 | Stop reason: HOSPADM

## 2022-05-20 RX ORDER — SODIUM CHLORIDE 0.9 % (FLUSH) 0.9 %
5-40 SYRINGE (ML) INJECTION AS NEEDED
Status: DISCONTINUED | OUTPATIENT
Start: 2022-05-20 | End: 2022-05-20 | Stop reason: HOSPADM

## 2022-05-20 RX ORDER — OXYCODONE HYDROCHLORIDE 5 MG/1
10 TABLET ORAL
Status: DISCONTINUED | OUTPATIENT
Start: 2022-05-20 | End: 2022-05-20 | Stop reason: HOSPADM

## 2022-05-20 RX ORDER — SODIUM CHLORIDE, SODIUM LACTATE, POTASSIUM CHLORIDE, CALCIUM CHLORIDE 600; 310; 30; 20 MG/100ML; MG/100ML; MG/100ML; MG/100ML
100 INJECTION, SOLUTION INTRAVENOUS CONTINUOUS
Status: DISCONTINUED | OUTPATIENT
Start: 2022-05-20 | End: 2022-05-20 | Stop reason: HOSPADM

## 2022-05-20 RX ORDER — LIDOCAINE HYDROCHLORIDE 10 MG/ML
0.1 INJECTION INFILTRATION; PERINEURAL AS NEEDED
Status: DISCONTINUED | OUTPATIENT
Start: 2022-05-20 | End: 2022-05-20 | Stop reason: HOSPADM

## 2022-05-20 RX ORDER — LIDOCAINE HYDROCHLORIDE 20 MG/ML
INJECTION, SOLUTION EPIDURAL; INFILTRATION; INTRACAUDAL; PERINEURAL AS NEEDED
Status: DISCONTINUED | OUTPATIENT
Start: 2022-05-20 | End: 2022-05-20 | Stop reason: HOSPADM

## 2022-05-20 RX ORDER — OXYCODONE HYDROCHLORIDE 5 MG/1
5 TABLET ORAL
Status: COMPLETED | OUTPATIENT
Start: 2022-05-20 | End: 2022-05-20

## 2022-05-20 RX ORDER — SODIUM CHLORIDE 0.9 % (FLUSH) 0.9 %
5-40 SYRINGE (ML) INJECTION EVERY 8 HOURS
Status: DISCONTINUED | OUTPATIENT
Start: 2022-05-20 | End: 2022-05-20 | Stop reason: HOSPADM

## 2022-05-20 RX ORDER — FENTANYL CITRATE 50 UG/ML
INJECTION, SOLUTION INTRAMUSCULAR; INTRAVENOUS AS NEEDED
Status: DISCONTINUED | OUTPATIENT
Start: 2022-05-20 | End: 2022-05-20 | Stop reason: HOSPADM

## 2022-05-20 RX ORDER — MIDAZOLAM HYDROCHLORIDE 1 MG/ML
2 INJECTION, SOLUTION INTRAMUSCULAR; INTRAVENOUS
Status: COMPLETED | OUTPATIENT
Start: 2022-05-20 | End: 2022-05-20

## 2022-05-20 RX ORDER — CEFAZOLIN SODIUM/WATER 2 G/20 ML
2 SYRINGE (ML) INTRAVENOUS ONCE
Status: COMPLETED | OUTPATIENT
Start: 2022-05-20 | End: 2022-05-20

## 2022-05-20 RX ORDER — ONDANSETRON 2 MG/ML
INJECTION INTRAMUSCULAR; INTRAVENOUS AS NEEDED
Status: DISCONTINUED | OUTPATIENT
Start: 2022-05-20 | End: 2022-05-20 | Stop reason: HOSPADM

## 2022-05-20 RX ORDER — OXYCODONE AND ACETAMINOPHEN 5; 325 MG/1; MG/1
1 TABLET ORAL
Qty: 15 TABLET | Refills: 0 | Status: SHIPPED | OUTPATIENT
Start: 2022-05-20 | End: 2022-05-25

## 2022-05-20 RX ORDER — MIDAZOLAM HYDROCHLORIDE 1 MG/ML
2 INJECTION, SOLUTION INTRAMUSCULAR; INTRAVENOUS ONCE
Status: DISCONTINUED | OUTPATIENT
Start: 2022-05-20 | End: 2022-05-20 | Stop reason: HOSPADM

## 2022-05-20 RX ORDER — LIDOCAINE HYDROCHLORIDE AND EPINEPHRINE 10; 10 MG/ML; UG/ML
INJECTION, SOLUTION INFILTRATION; PERINEURAL AS NEEDED
Status: DISCONTINUED | OUTPATIENT
Start: 2022-05-20 | End: 2022-05-20 | Stop reason: HOSPADM

## 2022-05-20 RX ORDER — NALOXONE HYDROCHLORIDE 0.4 MG/ML
0.2 INJECTION, SOLUTION INTRAMUSCULAR; INTRAVENOUS; SUBCUTANEOUS AS NEEDED
Status: DISCONTINUED | OUTPATIENT
Start: 2022-05-20 | End: 2022-05-20 | Stop reason: HOSPADM

## 2022-05-20 RX ORDER — ROCURONIUM BROMIDE 10 MG/ML
INJECTION, SOLUTION INTRAVENOUS AS NEEDED
Status: DISCONTINUED | OUTPATIENT
Start: 2022-05-20 | End: 2022-05-20 | Stop reason: HOSPADM

## 2022-05-20 RX ORDER — FLUMAZENIL 0.1 MG/ML
0.2 INJECTION INTRAVENOUS
Status: DISCONTINUED | OUTPATIENT
Start: 2022-05-20 | End: 2022-05-20 | Stop reason: HOSPADM

## 2022-05-20 RX ORDER — NEOSTIGMINE METHYLSULFATE 1 MG/ML
INJECTION, SOLUTION INTRAVENOUS AS NEEDED
Status: DISCONTINUED | OUTPATIENT
Start: 2022-05-20 | End: 2022-05-20 | Stop reason: HOSPADM

## 2022-05-20 RX ORDER — ACETAMINOPHEN 500 MG
1000 TABLET ORAL ONCE
Status: DISCONTINUED | OUTPATIENT
Start: 2022-05-20 | End: 2022-05-20 | Stop reason: HOSPADM

## 2022-05-20 RX ORDER — FENTANYL CITRATE 50 UG/ML
100 INJECTION, SOLUTION INTRAMUSCULAR; INTRAVENOUS ONCE
Status: DISCONTINUED | OUTPATIENT
Start: 2022-05-20 | End: 2022-05-20 | Stop reason: HOSPADM

## 2022-05-20 RX ORDER — GLYCOPYRROLATE 0.2 MG/ML
INJECTION INTRAMUSCULAR; INTRAVENOUS AS NEEDED
Status: DISCONTINUED | OUTPATIENT
Start: 2022-05-20 | End: 2022-05-20 | Stop reason: HOSPADM

## 2022-05-20 RX ORDER — HEPARIN SODIUM 5000 [USP'U]/ML
5000 INJECTION, SOLUTION INTRAVENOUS; SUBCUTANEOUS ONCE
Status: COMPLETED | OUTPATIENT
Start: 2022-05-20 | End: 2022-05-20

## 2022-05-20 RX ORDER — BUPIVACAINE HYDROCHLORIDE 5 MG/ML
INJECTION, SOLUTION EPIDURAL; INTRACAUDAL AS NEEDED
Status: DISCONTINUED | OUTPATIENT
Start: 2022-05-20 | End: 2022-05-20 | Stop reason: HOSPADM

## 2022-05-20 RX ORDER — PROPOFOL 10 MG/ML
INJECTION, EMULSION INTRAVENOUS AS NEEDED
Status: DISCONTINUED | OUTPATIENT
Start: 2022-05-20 | End: 2022-05-20 | Stop reason: HOSPADM

## 2022-05-20 RX ORDER — DEXAMETHASONE SODIUM PHOSPHATE 4 MG/ML
INJECTION, SOLUTION INTRA-ARTICULAR; INTRALESIONAL; INTRAMUSCULAR; INTRAVENOUS; SOFT TISSUE AS NEEDED
Status: DISCONTINUED | OUTPATIENT
Start: 2022-05-20 | End: 2022-05-20 | Stop reason: HOSPADM

## 2022-05-20 RX ADMIN — Medication 5 MG: at 10:47

## 2022-05-20 RX ADMIN — HYDROMORPHONE HYDROCHLORIDE 0.5 MG: 2 INJECTION INTRAMUSCULAR; INTRAVENOUS; SUBCUTANEOUS at 12:20

## 2022-05-20 RX ADMIN — HYDROMORPHONE HYDROCHLORIDE 0.5 MG: 2 INJECTION INTRAMUSCULAR; INTRAVENOUS; SUBCUTANEOUS at 12:02

## 2022-05-20 RX ADMIN — MIDAZOLAM HYDROCHLORIDE 2 MG: 1 INJECTION, SOLUTION INTRAMUSCULAR; INTRAVENOUS at 09:07

## 2022-05-20 RX ADMIN — OXYCODONE 5 MG: 5 TABLET ORAL at 12:33

## 2022-05-20 RX ADMIN — Medication 1 MG: at 11:49

## 2022-05-20 RX ADMIN — SODIUM CHLORIDE, SODIUM LACTATE, POTASSIUM CHLORIDE, AND CALCIUM CHLORIDE: 600; 310; 30; 20 INJECTION, SOLUTION INTRAVENOUS at 11:40

## 2022-05-20 RX ADMIN — HYDROMORPHONE HYDROCHLORIDE 0.5 MG: 2 INJECTION INTRAMUSCULAR; INTRAVENOUS; SUBCUTANEOUS at 12:15

## 2022-05-20 RX ADMIN — LIDOCAINE HYDROCHLORIDE 100 MG: 20 INJECTION, SOLUTION EPIDURAL; INFILTRATION; INTRACAUDAL; PERINEURAL at 10:27

## 2022-05-20 RX ADMIN — FENTANYL CITRATE 100 MCG: 50 INJECTION INTRAMUSCULAR; INTRAVENOUS at 10:27

## 2022-05-20 RX ADMIN — Medication 2 G: at 10:40

## 2022-05-20 RX ADMIN — Medication 1 MG: at 11:47

## 2022-05-20 RX ADMIN — HYDROMORPHONE HYDROCHLORIDE 0.5 MG: 2 INJECTION INTRAMUSCULAR; INTRAVENOUS; SUBCUTANEOUS at 12:09

## 2022-05-20 RX ADMIN — Medication 5 MG: at 11:39

## 2022-05-20 RX ADMIN — GLYCOPYRROLATE 0.2 MG: 0.2 INJECTION, SOLUTION INTRAMUSCULAR; INTRAVENOUS at 11:46

## 2022-05-20 RX ADMIN — KETOROLAC TROMETHAMINE 30 MG: 30 INJECTION, SOLUTION INTRAMUSCULAR; INTRAVENOUS at 11:42

## 2022-05-20 RX ADMIN — HEPARIN SODIUM 5000 UNITS: 5000 INJECTION INTRAVENOUS; SUBCUTANEOUS at 09:05

## 2022-05-20 RX ADMIN — PROPOFOL 200 MG: 10 INJECTION, EMULSION INTRAVENOUS at 10:27

## 2022-05-20 RX ADMIN — ROCURONIUM BROMIDE 50 MG: 50 INJECTION, SOLUTION INTRAVENOUS at 10:27

## 2022-05-20 RX ADMIN — DEXAMETHASONE SODIUM PHOSPHATE 4 MG: 4 INJECTION, SOLUTION INTRAMUSCULAR; INTRAVENOUS at 10:40

## 2022-05-20 RX ADMIN — GLYCOPYRROLATE 0.2 MG: 0.2 INJECTION, SOLUTION INTRAMUSCULAR; INTRAVENOUS at 11:48

## 2022-05-20 RX ADMIN — Medication 5 MG: at 10:53

## 2022-05-20 RX ADMIN — Medication 1 MG: at 11:48

## 2022-05-20 RX ADMIN — SODIUM CHLORIDE, SODIUM LACTATE, POTASSIUM CHLORIDE, AND CALCIUM CHLORIDE 100 ML/HR: 600; 310; 30; 20 INJECTION, SOLUTION INTRAVENOUS at 09:09

## 2022-05-20 RX ADMIN — METOPROLOL TARTRATE 25 MG: 25 TABLET, FILM COATED ORAL at 09:04

## 2022-05-20 RX ADMIN — PROPOFOL 40 MG: 10 INJECTION, EMULSION INTRAVENOUS at 11:19

## 2022-05-20 RX ADMIN — ONDANSETRON 4 MG: 2 INJECTION INTRAMUSCULAR; INTRAVENOUS at 11:28

## 2022-05-20 NOTE — OP NOTES
Laparoscopic Inguinal Herniorrhaphy with Mesh Procedure Note      Name:  Yanira Ruiz Date/Time of Admission: 2022  7:42 AM  CSN: [id number]  Attending Provider: Shanti Drew  MRN:  [id number]  : 1975 40 y.o. Pre-operative Diagnosis: right incarcerated Inguinal Hernia    Post-operative Diagnosis: right incarcerated Inguinal Hernia    Procedure:  Laparoscopic right incarcerated inguinal herniorrhaphy with mesh       Surgeon: Shanti Drew,*    Anesthesia: General    Indications: Yanira Ruiz is a 40 y.o. male with a symptomatic right inguinal hernia. Procedure Details: The patient was correctly identified in preoperative holding area. Consent was confirmed and placed on the chart. Preoperative antibiotics were administered per SCIP protocol. The patient was then taken back to the operative suite and placed in the supine position. General anesthesia was performed per anesthesia via an endotracheal tube. The patient's groin was then prepped and draped in the usual sterile fashion. A timeout was performed and all members of the team that were present were in agreement. The infraumbilical region was infiltrated with 0.5% marcaine local anesthetic. An incision was then made using a 15 blade scalpel. Dissection was carried down through the soft tissues using S-retractors to the anterior rectus fascia. An 11 blade scalpel was then used to make a vertical incision in the fascia. S-retractors were used to gently sweep the rectus muscles aside. A long Romy clamp was then passed along the preperitoneal space down to the level of the pubic symphysis. A 10 mm dissecting balloon was then placed through this tract and insufflated under direct visualization with a 10 mm laparoscope, creating a preperitoneal dissection plane. This was then switched out for a 10 mm structural balloon and a 30-degree laparoscope was inserted.   CO2 insufflation was performed to a pressure of 15 mm Hg, thus creating pneumopreperitoneum. The pubic symphysis was identified. Under direct visualization, two 5 mm trocars were inserted along the midline between the umbilical port and the pubic symphysis. First Attention was then turned to the right groin. Dolphin-tipped graspers were used to dissect in a medial to lateral dissection out to the ASIS. The inferior epigastric vessel was identified and protected. An indirect hernia was identified. The hernia sac was mobilized from the surrounding cord structures until it was freed and able to lie along the floor of the inguinal canal. It was noted to be incarcerated with omentum. A large Bard 3DMax mesh was then introduced via the umbilical port and positioned properly along the anterior abdominal wall. The mesh was affixed using the SorbaFix tacking device medially above the pubic symphysis and laterally near the ASIS. This yielded an adequate appearing repair. The pneumopreperitoneum was evacuated and the mesh was noted to lie in good position along the anterior abdominal wall. All trocars were removed and the testicle was noted to lie normally within the hemiscrotum. The fascia at the umbilical site was closed with a single figure-of-eight suture of 0-Vicryl. The skin at all sites was closed with subcuticular sutures of 3-0 Monocryl in a buried interrupted fashion. Mastisol and Steri-Strips were applied. The patient tolerated the procedure well with no complications and was sent to the Postanesthesia Care Unit in stable condition. All needle, sponge, and instruments counts were correct at the conclusion of the case.      Estimated Blood Loss:  Minimal    Specimens: none

## 2022-05-20 NOTE — ANESTHESIA PREPROCEDURE EVALUATION
Relevant Problems   CARDIOVASCULAR   (+) Inappropriate sinus tachycardia       Anesthetic History   No history of anesthetic complications            Review of Systems / Medical History  Patient summary reviewed and pertinent labs reviewed    Pulmonary                Comments: Month long hospitalization with COVID 8/21 - no intubation - essentially back to normal functionally   Neuro/Psych   Within defined limits           Cardiovascular                  Exercise tolerance: >4 METS     GI/Hepatic/Renal  Within defined limits              Endo/Other        Obesity     Other Findings              Physical Exam    Airway  Mallampati: III  TM Distance: 4 - 6 cm  Neck ROM: normal range of motion   Mouth opening: Normal     Cardiovascular    Rhythm: regular  Rate: normal         Dental    Dentition: Poor dentition and Full upper dentures     Pulmonary  Breath sounds clear to auscultation               Abdominal         Other Findings            Anesthetic Plan    ASA: 2  Anesthesia type: general          Induction: Intravenous  Anesthetic plan and risks discussed with: Patient

## 2022-05-20 NOTE — PERIOP NOTES
Discharge instructions given to pt and mom. Both verbalize understanding and all questions answered at this time.

## 2022-05-20 NOTE — DISCHARGE INSTRUCTIONS
DISCHARGE INSTRUCTIONS    Please call our office for a follow-up appointment in 1-2 week(s). Driving: No driving while taking pain medications    Activity: No strenous activity. Slowly advance as tolerated. No lifting greater than 20lbs. Diet:  Slowly advance as tolerated. Increase your fluids and fiber, as pain medications may cause constipation. No alcoholic beverages. Bathing: You may shower. No tub baths for 5 days. Dressing: If outer dressing, remove in 24 hours. Leave steri strips intact. Other:  Ice to incision as needed for pain. If drains present, empty and record    output daily. Call Dr. Foy Skiff if you have:  ? Temperature greater than 100.4  ? Persistent nausea and vomiting  ? Severe uncontrolled pain  ? Redness, tenderness, or signs of infection (pain, swelling, redness, odor or green/yellow discharge around the site)  ? Difficulty breathing, headache or visual disturbances  ? Hives  ? Persistent dizziness or light-headedness  ? Extreme fatigue  ? Any other questions or concerns you may have after discharge    In an emergency, call 911 or go to an Emergency Department at [facility] or [facility].    It is important to bring a complete, current list of your medications to any medical appointments or hospitalizations. I understand and acknowledge receipt of the above instructions.         _____________________________                                                                                                                                        Patient or Guardian Signature                                                         Date/Time      ______________________________                                                                                                                                      [id number] or R.N.'s Signature                                                        Date/Time      The discharge instructions have been reviewed with the patient and/or Guardian. Patient and/or Guardian signed and retained a printed copy. MEDICATION INTERACTION:  During your procedure you potentially received a medication or medications which may reduce the effectiveness of oral contraceptives. Please consider other forms of contraception for 1 month following your procedure if you are currently using oral contraceptives as your primary form of birth control. In addition to this, we recommend continuing your oral contraceptive as prescribed, unless otherwise instructed by your physician, during this time    After general anesthesia or intravenous sedation, for 24 hours or while taking prescription Narcotics:  · Limit your activities  · A responsible adult needs to be with you for the next 24 hours  · Do not drive and operate hazardous machinery  · Do not make important personal or business decisions  · Do not drink alcoholic beverages  · If you have not urinated within 8 hours after discharge, and you are experiencing discomfort from urinary retention, please go to the nearest ED. · If you have sleep apnea and have a CPAP machine, please use it for all naps and sleeping. · Please use caution when taking narcotics and any of your home medications that may cause drowsiness. *  Please give a list of your current medications to your Primary Care Provider. *  Please update this list whenever your medications are discontinued, doses are      changed, or new medications (including over-the-counter products) are added. *  Please carry medication information at all times in case of emergency situations. These are general instructions for a healthy lifestyle:  No smoking/ No tobacco products/ Avoid exposure to second hand smoke  Surgeon General's Warning:  Quitting smoking now greatly reduces serious risk to your health.   Obesity, smoking, and sedentary lifestyle greatly increases your risk for illness  A healthy diet, regular physical exercise & weight monitoring are important for maintaining a healthy lifestyle    You may be retaining fluid if you have a history of heart failure or if you experience any of the following symptoms:  Weight gain of 3 pounds or more overnight or 5 pounds in a week, increased swelling in our hands or feet or shortness of breath while lying flat in bed. Please call your doctor as soon as you notice any of these symptoms; do not wait until your next office visit.

## 2022-05-24 RX ORDER — ATORVASTATIN CALCIUM 10 MG/1
10 TABLET, FILM COATED ORAL DAILY
Qty: 30 TABLET | Refills: 1 | Status: SHIPPED | OUTPATIENT
Start: 2022-05-24 | End: 2022-07-26 | Stop reason: SDUPTHER

## 2022-05-26 ENCOUNTER — TELEPHONE (OUTPATIENT)
Dept: PULMONOLOGY | Age: 47
End: 2022-05-26

## 2022-05-26 DIAGNOSIS — R09.89 BIBASILAR CRACKLES: ICD-10-CM

## 2022-05-26 DIAGNOSIS — R91.8 PULMONARY INFILTRATES: ICD-10-CM

## 2022-05-26 DIAGNOSIS — J98.4 RESTRICTIVE LUNG DISEASE: Primary | ICD-10-CM

## 2022-05-26 DIAGNOSIS — Z86.16 HISTORY OF COVID-19: ICD-10-CM

## 2022-05-26 RX ORDER — HYDROXYZINE PAMOATE 25 MG/1
CAPSULE ORAL
Qty: 60 CAPSULE | OUTPATIENT
Start: 2022-05-26

## 2022-05-26 NOTE — TELEPHONE ENCOUNTER
During chart review for appt today, noted that he had laparoscopic right incarcerated inguinal herniorrhaphy with mesh 5/20/2022. He was scheduled to have CPFT's today prior to his appointment. Also, I do not see order for HRCT, which I have placed. I have contacted him, he states that he is doing well from respiratory standpoint. He has restrictions to avoid strenuous activity, heavy lifting and has follow-up with surgery next week. Please reschedule appointment for ~ 2 months from now and CP's same day of appointment, 40-minute appointment with me. I discussed that radiology will be calling him with appointment for CT and we would contact him with CPFT's/office appointment. Thank you.

## 2022-06-02 ENCOUNTER — PREP FOR PROCEDURE (OUTPATIENT)
Dept: SURGERY | Age: 47
End: 2022-06-02

## 2022-06-02 ENCOUNTER — OFFICE VISIT (OUTPATIENT)
Dept: SURGERY | Age: 47
End: 2022-06-02

## 2022-06-02 VITALS — WEIGHT: 225 LBS | HEIGHT: 68 IN | BODY MASS INDEX: 34.1 KG/M2

## 2022-06-02 DIAGNOSIS — Z09 POSTOPERATIVE EXAMINATION: Primary | ICD-10-CM

## 2022-06-02 PROBLEM — Z12.11 COLON CANCER SCREENING: Status: ACTIVE | Noted: 2022-06-02

## 2022-06-02 PROCEDURE — 99024 POSTOP FOLLOW-UP VISIT: CPT | Performed by: STUDENT IN AN ORGANIZED HEALTH CARE EDUCATION/TRAINING PROGRAM

## 2022-06-02 NOTE — PROGRESS NOTES
General Surgery New Patient Office Visit:    Pt presents s/p laparoscopic right inguinal hernia repair. Pt overall doing well, minimal pain. Tolerating diet and having bowel function. Patient has not had a colonoscopy yet. He denies any family history of colon cancer. Denies any blood in his stool. Denies any abdominal pain. Denies any reflux-like symptoms. Medications:   Current Outpatient Medications   Medication Sig Dispense Refill    atorvastatin (LIPITOR) 10 MG tablet Take 1 tablet by mouth daily 30 tablet 1    metoprolol tartrate (LOPRESSOR) 25 MG tablet Take 25 mg by mouth 2 times daily       No current facility-administered medications for this visit. Allergies:    Allergies   Allergen Reactions    Penicillins Hives        Past History:  Past Medical History:   Diagnosis Date    Acute respiratory failure with hypoxia ([facility] [address] 2021    COVID-19 2021    Difficulty sleeping 2021    Dyspnea on exertion 2021    Hypercholesterolemia     Pneumonia due to COVID-19 virus 2021    Sepsis due to COVID-19 [facility]) 2021      Past Surgical History:   Procedure Laterality Date    APPENDECTOMY          Family and Social History:  Family History   Problem Relation Age of Onset    No Known Problems Mother     No Known Problems Father      Social History     Socioeconomic History    Marital status:      Spouse name: Not on file    Number of children: Not on file    Years of education: Not on file    Highest education level: Not on file   Occupational History    Not on file   Tobacco Use    Smoking status: Former Smoker     Packs/day: 1.00     Quit date: 2017     Years since quittin.7    Smokeless tobacco: Former User    Tobacco comment: Quit smoking: quit vaping 2021   Substance and Sexual Activity    Alcohol use: Not Currently    Drug use: Never    Sexual activity: Not on file   Other Topics Concern    Not on file   Social History Narrative Employed in [place], 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[employer]       Social Determinants of Health     Financial Resource Strain:     Difficulty of Paying Living Expenses: Not on file   Food Insecurity:     Worried About [address] in the Last Year: Not on file    [address] in the Last Year: Not on file   Transportation Needs:     Lack of Transportation (Medical): Not on file    Lack of Transportation (Non-Medical): Not on file   Physical Activity:     Days of Exercise per Week: Not on file    Minutes of Exercise per Session: Not on file   Stress:     Feeling of Stress : Not on file   Social Connections:     Frequency of Communication with Friends and Family: Not on file    Frequency of Social Gatherings with Friends and Family: Not on file    Attends Nondenominational Services: Not on file    Active Member of [address] [place] [address] or Organizations: Not on file    Attends Club or Organization Meetings: Not on file    Marital Status: Not on file   Intimate Partner Violence:     Fear of Current or Ex-Partner: Not on file    Emotionally Abused: Not on file    Physically Abused: Not on file    Sexually Abused: Not on file   Housing Stability:     Unable to Pay for Housing in the Last Year: Not on file    Number of [place] in the Last Year: Not on file    Unstable Housing in the Last Year: Not on file        REVIEW OF SYSTEMS: 10 Point ROS negative except what is in HPI    PHYSICAL EXAMINATION:    Ht 5' 8\" (1.727 m)   Wt 225 lb (102.1 kg)   BMI 34.21 kg/m²     General Appearance AOx3, in no acute distress  Respiratory No chest wall deformities or tenderness, respiratory effort normal, no use of accessory muscles. Cardiovascular RRR. No chest wall tenderness. Gastrointestinal Abdomen soft, nontender, nondistended. BS x4. No rebound, guarding, or rigidity present. No palpable masses.  No CVA tenderness   Incisions: healing appropriately     Assessment:  Right inguinal hernia  Screening colonoscopy    Plan:  Pt overall doing well. Continue to slowly increase their activities of daily living. No concerns at this time. Pt can see me on an as needed basis. Patient has not had a colonoscopy yet. I will get him scheduled for this. Risks of endoscopy reviewed including but not limited to bleeding, perforation, missed lesion and incomplete exam.  All questions answered.

## 2022-06-03 NOTE — ANESTHESIA POSTPROCEDURE EVALUATION
Procedure(s):  RIGHT HERNIA INGUINAL REPAIR LAPAROSCOPIC POSS BILATERAL.     general    Anesthesia Post Evaluation      Multimodal analgesia: multimodal analgesia used between 6 hours prior to anesthesia start to PACU discharge  Patient location during evaluation: PACU  Patient participation: complete - patient participated  Level of consciousness: awake and alert  Pain management: adequate  Airway patency: patent  Anesthetic complications: no  Cardiovascular status: acceptable and hemodynamically stable  Respiratory status: acceptable  Hydration status: acceptable        INITIAL Post-op Vital signs:   Vitals Value Taken Time   /55 05/20/22 1247   Temp 36.7 °C (98 °F) 05/20/22 1247   Pulse 68 05/20/22 1247   Resp 16 05/20/22 1247   SpO2 98 % 05/20/22 1247

## 2022-07-02 PROBLEM — Z12.11 COLON CANCER SCREENING: Status: RESOLVED | Noted: 2022-06-02 | Resolved: 2022-07-02

## 2022-07-16 RX ORDER — ATORVASTATIN CALCIUM 10 MG/1
TABLET, FILM COATED ORAL
Qty: 30 TABLET | Refills: 1 | OUTPATIENT
Start: 2022-07-16

## 2022-07-22 PROBLEM — Z12.11 COLON CANCER SCREENING: Status: ACTIVE | Noted: 2022-06-02

## 2022-07-26 ENCOUNTER — OFFICE VISIT (OUTPATIENT)
Dept: FAMILY MEDICINE CLINIC | Facility: CLINIC | Age: 47
End: 2022-07-26
Payer: COMMERCIAL

## 2022-07-26 VITALS
BODY MASS INDEX: 35.46 KG/M2 | DIASTOLIC BLOOD PRESSURE: 84 MMHG | HEIGHT: 68 IN | HEART RATE: 70 BPM | WEIGHT: 234 LBS | SYSTOLIC BLOOD PRESSURE: 125 MMHG | OXYGEN SATURATION: 98 %

## 2022-07-26 DIAGNOSIS — Z11.59 NEED FOR HEPATITIS C SCREENING TEST: ICD-10-CM

## 2022-07-26 DIAGNOSIS — I10 PRIMARY HYPERTENSION: Primary | ICD-10-CM

## 2022-07-26 DIAGNOSIS — E78.2 MIXED HYPERLIPIDEMIA: ICD-10-CM

## 2022-07-26 DIAGNOSIS — Z11.4 ENCOUNTER FOR SCREENING FOR HIV: ICD-10-CM

## 2022-07-26 LAB
BASOPHILS # BLD: 0.1 K/UL (ref 0–0.2)
BASOPHILS NFR BLD: 1 % (ref 0–2)
DIFFERENTIAL METHOD BLD: NORMAL
EOSINOPHIL # BLD: 0.4 K/UL (ref 0–0.8)
EOSINOPHIL NFR BLD: 6 % (ref 0.5–7.8)
ERYTHROCYTE [DISTWIDTH] IN BLOOD BY AUTOMATED COUNT: 14.1 % (ref 11.9–14.6)
HCT VFR BLD AUTO: 47.7 % (ref 41.1–50.3)
HGB BLD-MCNC: 15.1 G/DL (ref 13.6–17.2)
IMM GRANULOCYTES # BLD AUTO: 0 K/UL (ref 0–0.5)
IMM GRANULOCYTES NFR BLD AUTO: 0 % (ref 0–5)
LYMPHOCYTES # BLD: 1.5 K/UL (ref 0.5–4.6)
LYMPHOCYTES NFR BLD: 22 % (ref 13–44)
MCH RBC QN AUTO: 28.8 PG (ref 26.1–32.9)
MCHC RBC AUTO-ENTMCNC: 31.7 G/DL (ref 31.4–35)
MCV RBC AUTO: 90.9 FL (ref 79.6–97.8)
MONOCYTES # BLD: 0.7 K/UL (ref 0.1–1.3)
MONOCYTES NFR BLD: 11 % (ref 4–12)
NEUTS SEG # BLD: 4.3 K/UL (ref 1.7–8.2)
NEUTS SEG NFR BLD: 61 % (ref 43–78)
NRBC # BLD: 0 K/UL (ref 0–0.2)
PLATELET # BLD AUTO: 261 K/UL (ref 150–450)
PMV BLD AUTO: 10.5 FL (ref 9.4–12.3)
RBC # BLD AUTO: 5.25 M/UL (ref 4.23–5.6)
WBC # BLD AUTO: 7.1 K/UL (ref 4.3–11.1)

## 2022-07-26 PROCEDURE — 99214 OFFICE O/P EST MOD 30 MIN: CPT | Performed by: NURSE PRACTITIONER

## 2022-07-26 RX ORDER — ATORVASTATIN CALCIUM 10 MG/1
10 TABLET, FILM COATED ORAL DAILY
Qty: 30 TABLET | Refills: 3 | Status: SHIPPED | OUTPATIENT
Start: 2022-07-26

## 2022-07-26 ASSESSMENT — PATIENT HEALTH QUESTIONNAIRE - PHQ9
SUM OF ALL RESPONSES TO PHQ QUESTIONS 1-9: 0
1. LITTLE INTEREST OR PLEASURE IN DOING THINGS: 0
2. FEELING DOWN, DEPRESSED OR HOPELESS: 0
SUM OF ALL RESPONSES TO PHQ9 QUESTIONS 1 & 2: 0
SUM OF ALL RESPONSES TO PHQ QUESTIONS 1-9: 0

## 2022-07-26 ASSESSMENT — ENCOUNTER SYMPTOMS
GASTROINTESTINAL NEGATIVE: 1
EYES NEGATIVE: 1
ALLERGIC/IMMUNOLOGIC NEGATIVE: 1
RESPIRATORY NEGATIVE: 1

## 2022-07-26 NOTE — PROGRESS NOTES
[address]  [address] [facility], [address] [facility]   (ph) [phone] (fax) [phone]  [place] NATHAN, JUSTICE[facility]      Chief Complaint   Patient presents with    Follow-up    Medication Refill        57 yo male comes into the office as a follow-up to chronic conditions. He reports seeing cardiology  and  he is also still seeing  pulmonology. Pt had right inguinal hernia repair and feels much better. He is scheduled for an EGD and colonoscopy in .       Allergies   Allergen Reactions    Penicillins Hives       Past Medical History:   Diagnosis Date    Acute respiratory failure with hypoxia ([facility] [address] 2021    COVID-19 2021    Difficulty sleeping 2021    Dyspnea on exertion 2021    Hypercholesterolemia     Pneumonia due to COVID-19 virus 2021    Sepsis due to COVID-19 [facility]) 2021       Family History   Problem Relation Age of Onset    No Known Problems Mother     No Known Problems Father        Social History     Socioeconomic History    Marital status:      Spouse name: Not on file    Number of children: Not on file    Years of education: Not on file    Highest education level: Not on file   Occupational History    Not on file   Tobacco Use    Smoking status: Former     Packs/day: 1.00     Types: Cigarettes     Quit date: 2017     Years since quittin.9    Smokeless tobacco: Former    Tobacco comments:     Quit smoking: quit vaping 2021   Substance and Sexual Activity    Alcohol use: Not Currently    Drug use: Never    Sexual activity: Not on file   Other Topics Concern    Not on file   Social History Narrative    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[address], 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[employer], 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[employer]       Social Determinants of Health     Financial Resource Strain: Not on file   Food Insecurity: Not on file   Transportation Needs: Not on file   Physical Activity: Not on file   Stress: Not on file   Social Connections: Not on file   Intimate Partner Violence: Not on file   Housing Stability: Not on file           Past Surgical History:   Procedure Laterality Date    APPENDECTOMY         Health Maintenance   Topic Date Due    COVID-19 Vaccine (1) Never done    HIV screen  Never done    Hepatitis C screen  Never done    DTaP/Tdap/Td vaccine (1 - Tdap) Never done    Diabetes screen  Never done    Colorectal Cancer Screen  Never done    Flu vaccine (1) 09/01/2022    Lipids  03/01/2023    Depression Screen  03/17/2023    Hepatitis A vaccine  Aged Out    Hepatitis B vaccine  Aged Out    Hib vaccine  Aged Out    Meningococcal (ACWY) vaccine  Aged Out    Pneumococcal 0-64 years Vaccine  Aged Out         Current Outpatient Medications:     atorvastatin (LIPITOR) 10 MG tablet, Take 1 tablet by mouth in the morning., Disp: 30 tablet, Rfl: 3    metoprolol tartrate (LOPRESSOR) 25 MG tablet, Take 25 mg by mouth 2 times daily, Disp: , Rfl:     Review of Systems   Constitutional: Negative. HENT: Negative. Eyes: Negative. Respiratory: Negative. Cardiovascular: Negative. Gastrointestinal: Negative. Endocrine: Negative. Genitourinary: Negative. Musculoskeletal: Negative. Skin: Negative. Allergic/Immunologic: Negative. Neurological: Negative. Hematological: Negative. Psychiatric/Behavioral: Negative. Vitals:    07/26/22 1305   BP: 125/84   Pulse: 70   SpO2: 98%        Physical Exam  Constitutional:       Appearance: Normal appearance. HENT:      Head: Normocephalic. Nose: Nose normal.   Eyes:      Extraocular Movements: Extraocular movements intact. Pupils: Pupils are equal, round, and reactive to light. Cardiovascular:      Rate and Rhythm: Normal rate and regular rhythm. Pulmonary:      Effort: Pulmonary effort is normal.      Breath sounds: Normal breath sounds. Abdominal:      General: Abdomen is flat. Palpations: Abdomen is soft. Musculoskeletal:         General: Normal range of motion. Cervical back: Normal range of motion and neck supple. Skin:     General: Skin is warm and dry. Neurological:      General: No focal deficit present. Mental Status: He is alert and oriented to person, place, and time. Psychiatric:         Mood and Affect: Mood normal.         Behavior: Behavior normal.            Assessment & Plan:    Varsha Mayo was seen today for follow-up and medication refill. Diagnoses and all orders for this visit:    1. Primary hypertension  stable  - Comprehensive Metabolic Panel; Future  - CBC with Auto Differential; Future  - CBC with Auto Differential  - Comprehensive Metabolic Panel    2. Mixed hyperlipidemia  stable  - atorvastatin (LIPITOR) 10 MG tablet; Take 1 tablet by mouth in the morning. Dispense: 30 tablet; Refill: 3  - Comprehensive Metabolic Panel; Future  - Lipid Panel; Future  - CK; Future  - CK  - Lipid Panel  - Comprehensive Metabolic Panel    3. Encounter for screening for HIV  - HIV 1/2 Ag/Ab, 4TH Generation,W Rflx Confirm; Future  - HIV 1/2 Ag/Ab, 4TH Generation,W Rflx Confirm    4. Need for hepatitis C screening test  - Hepatitis C Antibody; Future  - Hepatitis C Antibody    Greater than 50% counseling and/or coordination of care: the treatment regimen is extensive; detailed review. Will notify of labs. Recheck in 3 months. This note was dictated using dragon voice recognition software. It has been proofread, but there may still exist voice recognition errors that the author did not detect.       Signed By: JAC Joyce - DIONISIO     July 26, 2022

## 2022-07-27 LAB
ALBUMIN SERPL-MCNC: 4 G/DL (ref 3.5–5)
ALBUMIN/GLOB SERPL: 1.2 {RATIO} (ref 1.2–3.5)
ALP SERPL-CCNC: 98 U/L (ref 50–136)
ALT SERPL-CCNC: 37 U/L (ref 12–65)
ANION GAP SERPL CALC-SCNC: 9 MMOL/L (ref 7–16)
AST SERPL-CCNC: 24 U/L (ref 15–37)
BILIRUB SERPL-MCNC: 0.3 MG/DL (ref 0.2–1.1)
BUN SERPL-MCNC: 9 MG/DL (ref 6–23)
CALCIUM SERPL-MCNC: 8.4 MG/DL (ref 8.3–10.4)
CHLORIDE SERPL-SCNC: 107 MMOL/L (ref 98–107)
CHOLEST SERPL-MCNC: 166 MG/DL
CK SERPL-CCNC: 148 U/L (ref 21–215)
CO2 SERPL-SCNC: 23 MMOL/L (ref 21–32)
CREAT SERPL-MCNC: 1 MG/DL (ref 0.8–1.5)
GLOBULIN SER CALC-MCNC: 3.3 G/DL (ref 2.3–3.5)
GLUCOSE SERPL-MCNC: 83 MG/DL (ref 65–100)
HCV AB SER QL: NONREACTIVE
HDLC SERPL-MCNC: 52 MG/DL (ref 40–60)
HDLC SERPL: 3.2 {RATIO}
HIV 1+2 AB+HIV1 P24 AG SERPL QL IA: NONREACTIVE
HIV 1/2 RESULT COMMENT: NORMAL
LDLC SERPL CALC-MCNC: 93.8 MG/DL
POTASSIUM SERPL-SCNC: 4 MMOL/L (ref 3.5–5.1)
PROT SERPL-MCNC: 7.3 G/DL (ref 6.3–8.2)
SODIUM SERPL-SCNC: 139 MMOL/L (ref 138–145)
TRIGL SERPL-MCNC: 101 MG/DL (ref 35–150)
VLDLC SERPL CALC-MCNC: 20.2 MG/DL (ref 6–23)

## 2022-08-21 PROBLEM — Z12.11 COLON CANCER SCREENING: Status: RESOLVED | Noted: 2022-06-02 | Resolved: 2022-08-21

## 2022-09-09 ENCOUNTER — OFFICE VISIT (OUTPATIENT)
Dept: FAMILY MEDICINE CLINIC | Facility: CLINIC | Age: 47
End: 2022-09-09
Payer: COMMERCIAL

## 2022-09-09 VITALS
TEMPERATURE: 97.9 F | HEART RATE: 81 BPM | BODY MASS INDEX: 34.86 KG/M2 | HEIGHT: 68 IN | WEIGHT: 230 LBS | DIASTOLIC BLOOD PRESSURE: 80 MMHG | OXYGEN SATURATION: 96 % | SYSTOLIC BLOOD PRESSURE: 120 MMHG

## 2022-09-09 DIAGNOSIS — H57.89 IRRITATION OF LEFT EYE: ICD-10-CM

## 2022-09-09 DIAGNOSIS — H01.009: Primary | ICD-10-CM

## 2022-09-09 PROCEDURE — 99213 OFFICE O/P EST LOW 20 MIN: CPT | Performed by: NURSE PRACTITIONER

## 2022-09-09 ASSESSMENT — PATIENT HEALTH QUESTIONNAIRE - PHQ9
SUM OF ALL RESPONSES TO PHQ QUESTIONS 1-9: 0
1. LITTLE INTEREST OR PLEASURE IN DOING THINGS: 0
SUM OF ALL RESPONSES TO PHQ QUESTIONS 1-9: 0
SUM OF ALL RESPONSES TO PHQ QUESTIONS 1-9: 0
2. FEELING DOWN, DEPRESSED OR HOPELESS: 0
SUM OF ALL RESPONSES TO PHQ QUESTIONS 1-9: 0
SUM OF ALL RESPONSES TO PHQ9 QUESTIONS 1 & 2: 0

## 2022-09-09 NOTE — PROGRESS NOTES
Giuliana Martin. is a 56 y.o. male who presents today for the following:  Chief Complaint   Patient presents with    Conjunctivitis       Allergies   Allergen Reactions    Penicillins Hives       Current Outpatient Medications   Medication Sig Dispense Refill    azithromycin (AZASITE) 1 % ophthalmic solution Place 1 drop into the left eye 2 times daily for 10 days 2 times daily. 2.5 mL 0    polyethyl glycol-propyl glycol 0.4-0.3 % (SYSTANE) 0.4-0.3 % ophthalmic solution Place 1 drop into the left eye as needed for Dry Eyes Do not use at the same time as antibiotic eye drops. 10 mL 0    atorvastatin (LIPITOR) 10 MG tablet Take 1 tablet by mouth in the morning. 30 tablet 3    metoprolol tartrate (LOPRESSOR) 25 MG tablet Take 25 mg by mouth 2 times daily       No current facility-administered medications for this visit. Past Medical History:   Diagnosis Date    Acute respiratory failure with hypoxia ([facility] [address] 2021    COVID-19 2021    Difficulty sleeping 2021    Dyspnea on exertion 2021    Hypercholesterolemia     Pneumonia due to COVID-19 virus 2021    Sepsis due to COVID-19 [facility]) 2021       Past Surgical History:   Procedure Laterality Date    APPENDECTOMY         Social History     Tobacco Use    Smoking status: Former     Packs/day: 1.00     Types: Cigarettes     Quit date: 2017     Years since quittin.0    Smokeless tobacco: Former    Tobacco comments:     Quit smoking: quit vaping 2021   Substance Use Topics    Alcohol use: Not Currently        Family History   Problem Relation Age of Onset    No Known Problems Mother     No Known Problems Father      Pt presents for acute visit for left eye stye symptoms. He is established with Marjan Mei NP. Conjunctivitis   Associated symptoms include eye discharge and eye redness. Pertinent negatives include no fever, no eye itching, no rash and no eye pain.     Onset Monday evening, Tuesday real red; happened out of the blue and no warning. Hx of styes. Left eye lower lid location. Duration same since Monday with no better or worse. Positives:  Fox and oozes white color. Mild redness. Negatives:  No itching, no vision loss, no eye swelling, no injury, no foreign body sensation, and no pain. Lower left lid with mild swelling. No allergy symptoms. No recent illness. No sick contacts. 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 Review of Systems   Constitutional:  Negative for chills and fever. HENT: Negative. Eyes:  Positive for discharge and redness. Negative for pain, itching and visual disturbance. Skin:  Negative for rash and wound. Allergic/Immunologic: Negative for environmental allergies. Psychiatric/Behavioral: Negative. /80   Pulse 81   Temp 97.9 °F (36.6 °C)   Ht 5' 8\" (1.727 m)   Wt 230 lb (104.3 kg)   SpO2 96%   BMI 34.97 kg/m²     Physical Exam  Constitutional:       General: He is not in acute distress. Appearance: Normal appearance. He is obese. He is not ill-appearing, toxic-appearing or diaphoretic. HENT:      Head: Normocephalic and atraumatic. Right Ear: External ear normal.      Left Ear: External ear normal.   Eyes:      General: No allergic shiner or scleral icterus. Right eye: No discharge. Left eye: Discharge and hordeolum present. No foreign body. Extraocular Movements: Extraocular movements intact. Conjunctiva/sclera:      Left eye: Left conjunctiva is injected. Pupils: Pupils are equal, round, and reactive to light. Comments: Right eye conjunctivae/sclerae normal.  Mildly injected left sclerae. Lower left eye lid with small pinpoint stye/pimple mass mid lower left eye lid, redness along lower left eye lid, redness inner left eyelid, scant dried white drainage, and mild swelling along left eye lid. No foreign body noted. Cardiovascular:      Rate and Rhythm: Normal rate and regular rhythm. Pulses: Normal pulses. Heart sounds: Normal heart sounds. Pulmonary:      Effort: Pulmonary effort is normal.      Breath sounds: Normal breath sounds. Abdominal:      General: Bowel sounds are normal.      Palpations: Abdomen is soft. Musculoskeletal:         General: Normal range of motion. Cervical back: Normal range of motion and neck supple. Right lower leg: No edema. Left lower leg: No edema. Skin:     General: Skin is warm and dry. Coloration: Skin is not jaundiced or pale. Neurological:      General: No focal deficit present. Mental Status: He is alert and oriented to person, place, and time. Psychiatric:         Mood and Affect: Mood normal.         Behavior: Behavior normal.         Thought Content: Thought content normal.         Judgment: Judgment normal.        1. Blepharitis of lower eyelid  -     azithromycin (AZASITE) 1 % ophthalmic solution; Place 1 drop into the left eye 2 times daily for 10 days 2 times daily. , Disp-2.5 mL, R-0Normal  2. Irritation of left eye  -     polyethyl glycol-propyl glycol 0.4-0.3 % (SYSTANE) 0.4-0.3 % ophthalmic solution; Place 1 drop into the left eye as needed for Dry Eyes Do not use at the same time as antibiotic eye drops., Disp-10 mL, R-0Normal     Patient informed, we will call with blood work results within one week. If you have not heard regarding results in over a week, please contact office. You can also review results on mychart. Follow-up and Dispositions    Return in about 10 days (around 9/19/2022) for [facility] for eye .          JAC Walker - DIONISIO

## 2022-09-10 ASSESSMENT — ENCOUNTER SYMPTOMS
EYE DISCHARGE: 1
EYE ITCHING: 0
EYE PAIN: 0
EYE REDNESS: 1

## 2022-09-20 ENCOUNTER — TELEPHONE (OUTPATIENT)
Dept: FAMILY MEDICINE CLINIC | Facility: CLINIC | Age: 47
End: 2022-09-20

## 2022-09-20 ENCOUNTER — OFFICE VISIT (OUTPATIENT)
Dept: FAMILY MEDICINE CLINIC | Facility: CLINIC | Age: 47
End: 2022-09-20
Payer: COMMERCIAL

## 2022-09-20 VITALS
OXYGEN SATURATION: 94 % | SYSTOLIC BLOOD PRESSURE: 128 MMHG | HEART RATE: 82 BPM | HEIGHT: 68 IN | TEMPERATURE: 98.3 F | BODY MASS INDEX: 35.46 KG/M2 | WEIGHT: 234 LBS | DIASTOLIC BLOOD PRESSURE: 88 MMHG

## 2022-09-20 DIAGNOSIS — H57.89 IRRITATION OF LEFT EYE: ICD-10-CM

## 2022-09-20 DIAGNOSIS — H01.009: Primary | ICD-10-CM

## 2022-09-20 PROCEDURE — 99213 OFFICE O/P EST LOW 20 MIN: CPT | Performed by: NURSE PRACTITIONER

## 2022-09-20 ASSESSMENT — PATIENT HEALTH QUESTIONNAIRE - PHQ9
SUM OF ALL RESPONSES TO PHQ QUESTIONS 1-9: 0
1. LITTLE INTEREST OR PLEASURE IN DOING THINGS: 0
SUM OF ALL RESPONSES TO PHQ9 QUESTIONS 1 & 2: 0
2. FEELING DOWN, DEPRESSED OR HOPELESS: 0
SUM OF ALL RESPONSES TO PHQ QUESTIONS 1-9: 0

## 2022-09-20 NOTE — PROGRESS NOTES
Radha Mckenzie. is a 56 y.o. male who presents today for the following:  Chief Complaint   Patient presents with    Follow-up     Eye irritation       Allergies   Allergen Reactions    Penicillins Hives       Current Outpatient Medications   Medication Sig Dispense Refill    azithromycin (AZASITE) 1 % ophthalmic solution Place 1 drop into the left eye 2 times daily for 10 days 2.5 mL 0    polyethyl glycol-propyl glycol 0.4-0.3 % (SYSTANE) 0.4-0.3 % ophthalmic solution Place 1 drop into the left eye as needed for Dry Eyes Do not use at the same time as antibiotic eye drops. 10 mL 0    atorvastatin (LIPITOR) 10 MG tablet Take 1 tablet by mouth in the morning. 30 tablet 3    metoprolol tartrate (LOPRESSOR) 25 MG tablet Take 25 mg by mouth 2 times daily       No current facility-administered medications for this visit. Past Medical History:   Diagnosis Date    Acute respiratory failure with hypoxia ([facility] [address] 2021    COVID-19 2021    Difficulty sleeping 2021    Dyspnea on exertion 2021    Hypercholesterolemia     Pneumonia due to COVID-19 virus 2021    Sepsis due to COVID-19 [facility]) 2021       Past Surgical History:   Procedure Laterality Date    APPENDECTOMY         Social History     Tobacco Use    Smoking status: Former     Packs/day: 1.00     Types: Cigarettes     Quit date: 2017     Years since quittin.0    Smokeless tobacco: Former    Tobacco comments:     Quit smoking: quit vaping 2021   Substance Use Topics    Alcohol use: Not Currently        Family History   Problem Relation Age of Onset    No Known Problems Mother     No Known Problems Father        HPI   Pt presents for 10 day follow up of left eye blepharitis and left eye irritation. He denies any drainage, pain, swelling, visual disturbance, or any symptoms other than mild eye redness. Unfortunately, his pharmacy did not fill his eyedrops which was found out to be on backorder.   Patient agreeable to try to send to another pharmacy in [facility]. Patient reports he does not have an ophthalmologist.  Patient agreeable to referral.     Review of Systems   Constitutional:  Negative for chills and fever. HENT: Negative. Eyes:  Positive for redness. Negative for pain, discharge, swelling, itching and visual disturbance. Skin:  Negative for rash and wound. Allergic/Immunologic: Negative for environmental allergies. Psychiatric/Behavioral: Negative    /88   Pulse 82   Temp 98.3 °F (36.8 °C) (Oral)   Ht 5' 8\" (1.727 m)   Wt 234 lb (106.1 kg)   SpO2 94%   BMI 35.58 kg/m²     Physical Exam  Constitutional:       General: He is not in acute distress. Appearance: Normal appearance. He is obese. He is not ill-appearing, toxic-appearing or diaphoretic. HENT:      Head: Normocephalic and atraumatic. Right Ear: External ear normal.      Left Ear: External ear normal.   Eyes:      General: No allergic shiner or scleral icterus. Right eye: No discharge. Left eye: hordeolum present. No foreign body and no discharge. Extraocular Movements: Extraocular movements intact. Conjunctiva/sclera:      Left eye: Left conjunctiva is injected. Pupils: Pupils are equal, round, and reactive to light. Comments: Right eye conjunctivae/sclerae normal.  Mildly injected left sclerae. Lower left eye lid with small pinpoint stye/pimple mass (less prominent today) mid lower left eye lid, redness along lower left eye lid, and redness inner left eyelid. No foreign body, drainage, or swelling noted. Cardiovascular:      Rate and Rhythm: Normal rate and regular rhythm. Pulses: Normal pulses. Heart sounds: Normal heart sounds. Pulmonary:      Effort: Pulmonary effort is normal.      Breath sounds: Normal breath sounds. Abdominal:      General: Bowel sounds are normal.      Palpations: Abdomen is soft. Musculoskeletal:         General: Normal range of motion. Cervical back: Normal range of motion and neck supple. Right lower leg: No edema. Left lower leg: No edema. Skin:     General: Skin is warm and dry. Coloration: Skin is not jaundiced or pale. Neurological:      General: No focal deficit present. Mental Status: He is alert and oriented to person, place, and time. Psychiatric:         Mood and Affect: Mood normal.         Behavior: Behavior normal.         Thought Content: Thought content normal.         Judgment: Judgment normal.           1. Blepharitis of lower eyelid  -     azithromycin (AZASITE) 1 % ophthalmic solution; Place 1 drop into the left eye 2 times daily for 10 days, Disp-2.5 mL, R-0Normal  -     [facility]  2. Irritation of left eye  -     azithromycin (AZASITE) 1 % ophthalmic solution; Place 1 drop into the left eye 2 times daily for 10 days, Disp-2.5 mL, R-0Normal  -     [facility] - [address] [facility]     Patient reports history of recurrent stye to left eye. We will have ophthalmology evaluate. Continue warm compresses and lid cleanses with tear free baby shampoo. Patient informed, we will call with blood work results within one week. If you have not heard regarding results in over a week, please contact office. You can also review results on [employer].            JAC Thornton - CNP

## 2022-11-26 DIAGNOSIS — R00.0 TACHYCARDIA, UNSPECIFIED: ICD-10-CM

## 2022-11-26 DIAGNOSIS — E78.2 MIXED HYPERLIPIDEMIA: ICD-10-CM

## 2022-11-28 RX ORDER — ATORVASTATIN CALCIUM 10 MG/1
TABLET, FILM COATED ORAL
Qty: 30 TABLET | Refills: 3 | OUTPATIENT
Start: 2022-11-28

## 2023-10-02 DIAGNOSIS — R00.0 TACHYCARDIA, UNSPECIFIED: ICD-10-CM

## 2023-10-02 NOTE — TELEPHONE ENCOUNTER
Requested Prescriptions     Pending Prescriptions Disp Refills    metoprolol tartrate (LOPRESSOR) 25 MG tablet 180 tablet 0     Sig: TAKE 1 TABLET BY MOUTH TWO (2) TIMES A DAY

## 2023-10-02 NOTE — TELEPHONE ENCOUNTER
MEDICATION REFILL REQUEST          Name of Medication:  Metoprolol tartrate   Dose:  25 mg  Frequency:  twice daily  Quantity:     Days' supply:  60          Pharmacy Name/Location:  [facility]

## 2023-11-01 ENCOUNTER — OFFICE VISIT (OUTPATIENT)
Dept: FAMILY MEDICINE CLINIC | Facility: CLINIC | Age: 48
End: 2023-11-01
Payer: COMMERCIAL

## 2023-11-01 VITALS
DIASTOLIC BLOOD PRESSURE: 84 MMHG | SYSTOLIC BLOOD PRESSURE: 122 MMHG | TEMPERATURE: 98 F | WEIGHT: 238 LBS | OXYGEN SATURATION: 98 % | HEIGHT: 68 IN | BODY MASS INDEX: 36.07 KG/M2 | HEART RATE: 88 BPM

## 2023-11-01 DIAGNOSIS — E78.2 MIXED HYPERLIPIDEMIA: ICD-10-CM

## 2023-11-01 DIAGNOSIS — R73.09 ELEVATED GLUCOSE: ICD-10-CM

## 2023-11-01 DIAGNOSIS — R35.1 NOCTURIA: ICD-10-CM

## 2023-11-01 DIAGNOSIS — E66.09 CLASS 2 OBESITY DUE TO EXCESS CALORIES WITHOUT SERIOUS COMORBIDITY WITH BODY MASS INDEX (BMI) OF 36.0 TO 36.9 IN ADULT: ICD-10-CM

## 2023-11-01 DIAGNOSIS — R00.0 TACHYCARDIA, UNSPECIFIED: Primary | ICD-10-CM

## 2023-11-01 DIAGNOSIS — F17.200 NICOTINE DEPENDENCE, UNCOMPLICATED, UNSPECIFIED NICOTINE PRODUCT TYPE: ICD-10-CM

## 2023-11-01 DIAGNOSIS — D17.9 LIPOMA, UNSPECIFIED SITE: ICD-10-CM

## 2023-11-01 DIAGNOSIS — I10 PRIMARY HYPERTENSION: ICD-10-CM

## 2023-11-01 DIAGNOSIS — Z12.11 COLON CANCER SCREENING: ICD-10-CM

## 2023-11-01 LAB
ALBUMIN SERPL-MCNC: 4.2 G/DL (ref 3.5–5)
ALBUMIN/GLOB SERPL: 1.6 (ref 0.4–1.6)
ALP SERPL-CCNC: 99 U/L (ref 50–136)
ALT SERPL-CCNC: 40 U/L (ref 12–65)
ANION GAP SERPL CALC-SCNC: 5 MMOL/L (ref 2–11)
AST SERPL-CCNC: 27 U/L (ref 15–37)
BASOPHILS # BLD: 0.1 K/UL (ref 0–0.2)
BASOPHILS NFR BLD: 1 % (ref 0–2)
BILIRUB SERPL-MCNC: 0.4 MG/DL (ref 0.2–1.1)
BUN SERPL-MCNC: 15 MG/DL (ref 6–23)
CALCIUM SERPL-MCNC: 8.7 MG/DL (ref 8.3–10.4)
CHLORIDE SERPL-SCNC: 112 MMOL/L (ref 101–110)
CHOLEST SERPL-MCNC: 240 MG/DL
CO2 SERPL-SCNC: 28 MMOL/L (ref 21–32)
CREAT SERPL-MCNC: 1 MG/DL (ref 0.8–1.5)
DIFFERENTIAL METHOD BLD: NORMAL
EOSINOPHIL # BLD: 0.3 K/UL (ref 0–0.8)
EOSINOPHIL NFR BLD: 3 % (ref 0.5–7.8)
ERYTHROCYTE [DISTWIDTH] IN BLOOD BY AUTOMATED COUNT: 13.5 % (ref 11.9–14.6)
GLOBULIN SER CALC-MCNC: 2.7 G/DL (ref 2.8–4.5)
GLUCOSE SERPL-MCNC: 100 MG/DL (ref 65–100)
HCT VFR BLD AUTO: 48.8 % (ref 41.1–50.3)
HDLC SERPL-MCNC: 47 MG/DL (ref 40–60)
HDLC SERPL: 5.1
HGB BLD-MCNC: 15.7 G/DL (ref 13.6–17.2)
IMM GRANULOCYTES # BLD AUTO: 0 K/UL (ref 0–0.5)
IMM GRANULOCYTES NFR BLD AUTO: 0 % (ref 0–5)
LDLC SERPL CALC-MCNC: 162.8 MG/DL
LYMPHOCYTES # BLD: 1.5 K/UL (ref 0.5–4.6)
LYMPHOCYTES NFR BLD: 21 % (ref 13–44)
MCH RBC QN AUTO: 29.1 PG (ref 26.1–32.9)
MCHC RBC AUTO-ENTMCNC: 32.2 G/DL (ref 31.4–35)
MCV RBC AUTO: 90.4 FL (ref 82–102)
MONOCYTES # BLD: 0.7 K/UL (ref 0.1–1.3)
MONOCYTES NFR BLD: 9 % (ref 4–12)
NEUTS SEG # BLD: 4.8 K/UL (ref 1.7–8.2)
NEUTS SEG NFR BLD: 66 % (ref 43–78)
NRBC # BLD: 0 K/UL (ref 0–0.2)
PLATELET # BLD AUTO: 252 K/UL (ref 150–450)
PMV BLD AUTO: 10.1 FL (ref 9.4–12.3)
POTASSIUM SERPL-SCNC: 3.8 MMOL/L (ref 3.5–5.1)
PROT SERPL-MCNC: 6.9 G/DL (ref 6.3–8.2)
PSA SERPL-MCNC: 0.3 NG/ML
RBC # BLD AUTO: 5.4 M/UL (ref 4.23–5.6)
SODIUM SERPL-SCNC: 145 MMOL/L (ref 133–143)
TRIGL SERPL-MCNC: 151 MG/DL (ref 35–150)
TSH W FREE THYROID IF ABNORMAL: 3.34 UIU/ML (ref 0.36–3.74)
VLDLC SERPL CALC-MCNC: 30.2 MG/DL (ref 6–23)
WBC # BLD AUTO: 7.4 K/UL (ref 4.3–11.1)

## 2023-11-01 PROCEDURE — 3079F DIAST BP 80-89 MM HG: CPT | Performed by: NURSE PRACTITIONER

## 2023-11-01 PROCEDURE — 99406 BEHAV CHNG SMOKING 3-10 MIN: CPT | Performed by: NURSE PRACTITIONER

## 2023-11-01 PROCEDURE — 99214 OFFICE O/P EST MOD 30 MIN: CPT | Performed by: NURSE PRACTITIONER

## 2023-11-01 PROCEDURE — 3074F SYST BP LT 130 MM HG: CPT | Performed by: NURSE PRACTITIONER

## 2023-11-01 RX ORDER — ATORVASTATIN CALCIUM 10 MG/1
10 TABLET, FILM COATED ORAL DAILY
Qty: 90 TABLET | Refills: 1 | Status: SHIPPED | OUTPATIENT
Start: 2023-11-01

## 2023-11-01 SDOH — ECONOMIC STABILITY: INCOME INSECURITY: HOW HARD IS IT FOR YOU TO PAY FOR THE VERY BASICS LIKE FOOD, HOUSING, MEDICAL CARE, AND HEATING?: SOMEWHAT HARD

## 2023-11-01 SDOH — ECONOMIC STABILITY: HOUSING INSECURITY
IN THE LAST 12 MONTHS, WAS THERE A TIME WHEN YOU DID NOT HAVE A STEADY PLACE TO SLEEP OR SLEPT IN A SHELTER (INCLUDING NOW)?: NO

## 2023-11-01 SDOH — ECONOMIC STABILITY: FOOD INSECURITY: WITHIN THE PAST 12 MONTHS, YOU WORRIED THAT YOUR FOOD WOULD RUN OUT BEFORE YOU GOT MONEY TO BUY MORE.: NEVER TRUE

## 2023-11-01 SDOH — ECONOMIC STABILITY: FOOD INSECURITY: WITHIN THE PAST 12 MONTHS, THE FOOD YOU BOUGHT JUST DIDN'T LAST AND YOU DIDN'T HAVE MONEY TO GET MORE.: NEVER TRUE

## 2023-11-01 ASSESSMENT — ENCOUNTER SYMPTOMS
ALLERGIC/IMMUNOLOGIC NEGATIVE: 1
GASTROINTESTINAL NEGATIVE: 1
EYES NEGATIVE: 1
RESPIRATORY NEGATIVE: 1

## 2023-11-01 ASSESSMENT — PATIENT HEALTH QUESTIONNAIRE - PHQ9
SUM OF ALL RESPONSES TO PHQ QUESTIONS 1-9: 0
2. FEELING DOWN, DEPRESSED OR HOPELESS: 0
SUM OF ALL RESPONSES TO PHQ QUESTIONS 1-9: 0
SUM OF ALL RESPONSES TO PHQ QUESTIONS 1-9: 0
SUM OF ALL RESPONSES TO PHQ9 QUESTIONS 1 & 2: 0
SUM OF ALL RESPONSES TO PHQ QUESTIONS 1-9: 0
1. LITTLE INTEREST OR PLEASURE IN DOING THINGS: 0

## 2023-11-01 NOTE — PROGRESS NOTES
[address] [facility]  [address] [facility], [address] [place] [address]   (ph) [phone] (fax) [phone]  ABDIRASHID Payton      Chief Complaint   Patient presents with    Annual Exam       54 yo male comes into the office as a follow-up to chronic conditions. He reports seeing cardiology  and  he is also still seeing  pulmonology. Reports tachycardia has improved. He has a skin lesion that he wants looked at on his back as well.          Allergies   Allergen Reactions    Penicillins Hives       Past Medical History:   Diagnosis Date    Acute respiratory failure with hypoxia ([address]) 2021    COVID-19 2021    Difficulty sleeping 2021    Dyspnea on exertion 2021    Hypercholesterolemia     Pneumonia due to COVID-19 virus 2021    Sepsis due to COVID-19 [facility]) 2021       Family History   Problem Relation Age of Onset    No Known Problems Mother     No Known Problems Father        Social History     Socioeconomic History    Marital status:      Spouse name: Not on file    Number of children: Not on file    Years of education: Not on file    Highest education level: Not on file   Occupational History    Not on file   Tobacco Use    Smoking status: Former     Packs/day: 1     Types: Cigarettes     Quit date: 2017     Years since quittin.1    Smokeless tobacco: Former    Tobacco comments:     Quit smoking: quit vaping 2021   Substance and Sexual Activity    Alcohol use: Not Currently    Drug use: Never    Sexual activity: Not on file   Other Topics Concern    Not on file   Social History Narrative    Employed in [employer], 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

## 2023-11-02 NOTE — RESULT ENCOUNTER NOTE
To pt. Zlsj=058, was 166. Wmsa=226, was 101. HDL=47, was 52. CSK=171.8, was 93.8. Has he missed his cholesterol meds? Sodium is increased. Decrease Na+ in diet. TSH and PSA are wnl. Hgb=15.7.   Thanks

## 2023-11-03 ENCOUNTER — TELEPHONE (OUTPATIENT)
Dept: FAMILY MEDICINE CLINIC | Facility: CLINIC | Age: 48
End: 2023-11-03

## 2023-11-03 NOTE — TELEPHONE ENCOUNTER
Melanie Stratton,   Did we do an A1C on this pt? If so, please enter. If not, please add to recent labs.     Thanks

## 2023-11-06 DIAGNOSIS — R73.09 ELEVATED GLUCOSE: ICD-10-CM

## 2023-11-06 LAB
EST. AVERAGE GLUCOSE BLD GHB EST-MCNC: 103 MG/DL
HBA1C MFR BLD: 5.2 % (ref 4.8–5.6)

## 2023-11-14 ENCOUNTER — OFFICE VISIT (OUTPATIENT)
Age: 48
End: 2023-11-14
Payer: COMMERCIAL

## 2023-11-14 VITALS
HEART RATE: 77 BPM | BODY MASS INDEX: 35.25 KG/M2 | HEIGHT: 69 IN | WEIGHT: 238 LBS | DIASTOLIC BLOOD PRESSURE: 78 MMHG | SYSTOLIC BLOOD PRESSURE: 118 MMHG

## 2023-11-14 DIAGNOSIS — I47.11 INAPPROPRIATE SINUS TACHYCARDIA: Primary | ICD-10-CM

## 2023-11-14 DIAGNOSIS — E78.2 MIXED HYPERLIPIDEMIA: ICD-10-CM

## 2023-11-14 DIAGNOSIS — U07.0 VAPING-RELATED DISORDER: ICD-10-CM

## 2023-11-14 DIAGNOSIS — I45.10 RBBB: ICD-10-CM

## 2023-11-14 PROCEDURE — 99214 OFFICE O/P EST MOD 30 MIN: CPT | Performed by: INTERNAL MEDICINE

## 2023-11-14 PROCEDURE — 93000 ELECTROCARDIOGRAM COMPLETE: CPT | Performed by: INTERNAL MEDICINE

## 2023-11-14 ASSESSMENT — ENCOUNTER SYMPTOMS
HEMATEMESIS: 0
STRIDOR: 0
DOUBLE VISION: 0
WHEEZING: 0
HEMATOCHEZIA: 0
HOARSE VOICE: 0
ABDOMINAL PAIN: 0
EYE REDNESS: 0
HEMOPTYSIS: 0

## 2023-11-14 NOTE — PROGRESS NOTES
take it regularly. Counseled on importance of lifestyle measures also including routine cardiovascular exercise and walking. Vaping-related disorder  Counseled on importance of quitting completely. He has managed to quit cigarettes completely but now is vaping and understands the need to quit this also. RBBB  -Chronic and unchanged compared to his last EKG. We will monitor over time. No syncope or presyncope       Overall Impression  -I made no changes with his medical therapy. Low-dose metoprolol has done well to control his tachycardia and his heart rates at rest on the 70s. He has an underlying right bundle branch block but it is almost unchanged compared to when we last saw him. No complaints of chest pain or any issues at this point. He went off his atorvastatin briefly and his lipids trended upwards substantially so he understands the need to stay on it and he is back on it. Only on 10 mg daily and we can always uptitrate if needed. I would only need to see him in follow-up in about 12 months time. Have counseled him on the importance of quitting his vaping completely especially since he vapes nicotine. On this date I have spent 40 minutes personally reviewing previous notes/outside records, test results and face to face time with the patient discussing the diagnosis and importance of compliance with the treatment plan as well as documenting on the day of the visit. Elements of this note have been dictated using speech recognition software. As a result, errors of speech recognition may have occurred. Return in about 1 year (around 11/14/2024) for RBBB, tachycardia, hld. Thank you for allowing us to participate in the care of your patient. If you have any further questions, please do not hesitate to contact us.   Sincerely,        Tony Brady MD   11/14/2023

## 2023-11-15 ENCOUNTER — OFFICE VISIT (OUTPATIENT)
Dept: SURGERY | Age: 48
End: 2023-11-15

## 2023-11-15 VITALS
HEART RATE: 79 BPM | HEIGHT: 69 IN | DIASTOLIC BLOOD PRESSURE: 84 MMHG | BODY MASS INDEX: 34.96 KG/M2 | SYSTOLIC BLOOD PRESSURE: 136 MMHG | WEIGHT: 236 LBS

## 2023-11-15 DIAGNOSIS — R22.2 MASS OF SKIN OF BACK: Primary | ICD-10-CM

## 2023-11-17 ASSESSMENT — ENCOUNTER SYMPTOMS
ALLERGIC/IMMUNOLOGIC NEGATIVE: 1
EYES NEGATIVE: 1
GASTROINTESTINAL NEGATIVE: 1
RESPIRATORY NEGATIVE: 1

## 2023-11-17 NOTE — PROGRESS NOTES
doing this after the beginning of the new year and will call us in January to schedule. He will also call with any problems or changes. ASSESSMENT/PLAN:  Barbara Li was seen today for new patient. Diagnoses and all orders for this visit:    Mass of skin of back      Call to schedule excision of back mass.     Iggy De Dios MD

## 2024-01-18 ENCOUNTER — TELEPHONE (OUTPATIENT)
Dept: GASTROENTEROLOGY | Age: 49
End: 2024-01-18

## 2024-02-12 ENCOUNTER — OFFICE VISIT (OUTPATIENT)
Dept: SURGERY | Age: 49
End: 2024-02-12
Payer: COMMERCIAL

## 2024-02-12 VITALS
HEIGHT: 69 IN | WEIGHT: 229 LBS | SYSTOLIC BLOOD PRESSURE: 132 MMHG | BODY MASS INDEX: 33.92 KG/M2 | DIASTOLIC BLOOD PRESSURE: 76 MMHG | HEART RATE: 81 BPM

## 2024-02-12 DIAGNOSIS — R22.2 MASS OF SKIN OF BACK: Primary | ICD-10-CM

## 2024-02-12 PROCEDURE — 99212 OFFICE O/P EST SF 10 MIN: CPT | Performed by: SURGERY

## 2024-02-12 PROCEDURE — 10060 I&D ABSCESS SIMPLE/SINGLE: CPT | Performed by: SURGERY

## 2024-02-12 RX ORDER — SULFAMETHOXAZOLE AND TRIMETHOPRIM 800; 160 MG/1; MG/1
1 TABLET ORAL 2 TIMES DAILY
Qty: 14 TABLET | Refills: 0 | Status: SHIPPED | OUTPATIENT
Start: 2024-02-12 | End: 2024-02-19

## 2024-02-12 NOTE — PROGRESS NOTES
2024    Ran Adame  MRN: [id number]      CHIEF COMPLAINT: Back mass      PRIMARY CARE PHYSICIAN: Cheyenne Villalobos, APRN - CNP      HISTORY:  Presented today for excision of a back mass/cyst but on evaluation was noted to have an infection at the site.  I&D performed today.  REVIEW OF SYSTEMS:  Review of Systems   Constitutional: Negative.    HENT: Negative.     Eyes: Negative.    Respiratory: Negative.     Cardiovascular: Negative.    Gastrointestinal: Negative.    Endocrine: Negative.    Genitourinary: Negative.    Musculoskeletal: Negative.    Skin: Negative.    Allergic/Immunologic: Negative.    Neurological: Negative.    Hematological: Negative.    Psychiatric/Behavioral: Negative.            Past Medical History:   Diagnosis Date    Acute respiratory failure with hypoxia (HCC) 2021    COVID-19 2021    Difficulty sleeping 2021    Dyspnea on exertion 2021    Hypercholesterolemia     Pneumonia due to COVID-19 virus 2021    Sepsis due to COVID-19 (HCC) 2021       Current Outpatient Medications   Medication Sig Dispense Refill    sulfamethoxazole-trimethoprim (BACTRIM DS;SEPTRA DS) 800-160 MG per tablet Take 1 tablet by mouth 2 times daily for 7 days 14 tablet 0    metoprolol tartrate (LOPRESSOR) 25 MG tablet TAKE 1 TABLET BY MOUTH TWO (2) TIMES A  tablet 1    atorvastatin (LIPITOR) 10 MG tablet Take 1 tablet by mouth daily 90 tablet 1     No current facility-administered medications for this visit.       Family History   Problem Relation Age of Onset    No Known Problems Mother     No Known Problems Father        Social History     Socioeconomic History    Marital status:      Spouse name: None    Number of children: None    Years of education: None    Highest education level: None   Tobacco Use    Smoking status: Former     Current packs/day: 0.00     Types: Cigarettes     Quit date: 2017     Years since quittin.4    Smokeless

## 2024-02-12 NOTE — PROGRESS NOTES
Incision and Drainage Procedure Note    Pre-operative Diagnosis: Infected sebaceous cyst on back    Post-operative Diagnosis: same    Indications: Same    Anesthesia: 1% plain lidocaine    Procedure Details   The procedure, risks and complications have been discussed in detail (including, but not limited to airway compromise, infection, bleeding) with the patient, and the patient has signed consent to the procedure.    The area was injected with local anesthetic and a skin incision was made over the central portion of the mass using an 11 blade scalpel.  Pus and sebaceous material was evacuated from the site.  The area was thoroughly cleaned and then covered with a dry dressing after ensuring hemostasis.    Findings:  Infected sebaceous cyst on back    EBL: 2 cc's      Condition:  Stable    Complications:  none.

## 2024-02-27 ENCOUNTER — OFFICE VISIT (OUTPATIENT)
Dept: SURGERY | Age: 49
End: 2024-02-27
Payer: COMMERCIAL

## 2024-02-27 ENCOUNTER — PREP FOR PROCEDURE (OUTPATIENT)
Dept: SURGERY | Age: 49
End: 2024-02-27

## 2024-02-27 VITALS
DIASTOLIC BLOOD PRESSURE: 101 MMHG | BODY MASS INDEX: 34.11 KG/M2 | WEIGHT: 230.3 LBS | SYSTOLIC BLOOD PRESSURE: 168 MMHG | HEIGHT: 69 IN | HEART RATE: 93 BPM

## 2024-02-27 DIAGNOSIS — R22.2 MASS OF SKIN OF BACK: Primary | ICD-10-CM

## 2024-02-27 DIAGNOSIS — D23.5 DERMOID CYST OF SKIN OF BACK: ICD-10-CM

## 2024-02-27 PROCEDURE — 99214 OFFICE O/P EST MOD 30 MIN: CPT | Performed by: SURGERY

## 2024-02-27 ASSESSMENT — ENCOUNTER SYMPTOMS
GASTROINTESTINAL NEGATIVE: 1
EYES NEGATIVE: 1
RESPIRATORY NEGATIVE: 1
ALLERGIC/IMMUNOLOGIC NEGATIVE: 1

## 2024-02-27 NOTE — PROGRESS NOTES
2024    Ran Adame  MRN: [id number]      CHIEF COMPLAINT: No problems, less drainage      PRIMARY CARE PHYSICIAN: Cheyenne Villalobos, APRN - CNP      HISTORY:  Underwent incision and drainage of an infected sebaceous cyst on his back on 2024.  Since that time he tells me that he feels better, the swelling is decreased, he has minimal drainage per day.  He is here to discuss excision.      REVIEW OF SYSTEMS:  Review of Systems   Constitutional: Negative.    HENT: Negative.     Eyes: Negative.    Respiratory: Negative.     Cardiovascular: Negative.    Gastrointestinal: Negative.    Endocrine: Negative.    Genitourinary: Negative.    Musculoskeletal: Negative.    Skin: Negative.    Allergic/Immunologic: Negative.    Neurological: Negative.    Hematological: Negative.    Psychiatric/Behavioral: Negative.            Past Medical History:   Diagnosis Date    Acute respiratory failure with hypoxia (HCC) 2021    COVID-19 2021    Difficulty sleeping 2021    Dyspnea on exertion 2021    Hypercholesterolemia     Pneumonia due to COVID-19 virus 2021    Sepsis due to COVID-19 (HCC) 2021       Current Outpatient Medications   Medication Sig Dispense Refill    metoprolol tartrate (LOPRESSOR) 25 MG tablet TAKE 1 TABLET BY MOUTH TWO (2) TIMES A  tablet 1    atorvastatin (LIPITOR) 10 MG tablet Take 1 tablet by mouth daily 90 tablet 1     No current facility-administered medications for this visit.       Family History   Problem Relation Age of Onset    No Known Problems Mother     No Known Problems Father        Social History     Socioeconomic History    Marital status:      Spouse name: None    Number of children: None    Years of education: None    Highest education level: None   Tobacco Use    Smoking status: Former     Current packs/day: 0.00     Types: Cigarettes     Quit date: 2017     Years since quittin.4    Smokeless tobacco: Former

## 2024-03-06 ENCOUNTER — OFFICE VISIT (OUTPATIENT)
Dept: FAMILY MEDICINE CLINIC | Facility: CLINIC | Age: 49
End: 2024-03-06
Payer: COMMERCIAL

## 2024-03-06 VITALS
DIASTOLIC BLOOD PRESSURE: 70 MMHG | OXYGEN SATURATION: 98 % | HEART RATE: 91 BPM | SYSTOLIC BLOOD PRESSURE: 124 MMHG | HEIGHT: 68 IN | BODY MASS INDEX: 35.28 KG/M2 | TEMPERATURE: 98.5 F | WEIGHT: 232.8 LBS

## 2024-03-06 DIAGNOSIS — H53.8 BLURRY VISION, RIGHT EYE: Primary | ICD-10-CM

## 2024-03-06 PROCEDURE — 99214 OFFICE O/P EST MOD 30 MIN: CPT | Performed by: FAMILY MEDICINE

## 2024-03-06 ASSESSMENT — PATIENT HEALTH QUESTIONNAIRE - PHQ9
2. FEELING DOWN, DEPRESSED OR HOPELESS: 0
SUM OF ALL RESPONSES TO PHQ QUESTIONS 1-9: 0
SUM OF ALL RESPONSES TO PHQ QUESTIONS 1-9: 0
SUM OF ALL RESPONSES TO PHQ9 QUESTIONS 1 & 2: 0
SUM OF ALL RESPONSES TO PHQ QUESTIONS 1-9: 0
1. LITTLE INTEREST OR PLEASURE IN DOING THINGS: 0
SUM OF ALL RESPONSES TO PHQ QUESTIONS 1-9: 0

## 2024-03-06 ASSESSMENT — ENCOUNTER SYMPTOMS
PHOTOPHOBIA: 0
EYE ITCHING: 0
EYE DISCHARGE: 0
EYE REDNESS: 0
EYE PAIN: 0

## 2024-03-06 NOTE — PROGRESS NOTES
Ran Adame Jr. is a 48 y.o. male who presents today for the following:  Chief Complaint   Patient presents with    Eye Problem     R eye blurry x1 week       Allergies   Allergen Reactions    Penicillins Hives       Current Outpatient Medications   Medication Sig Dispense Refill    metoprolol tartrate (LOPRESSOR) 25 MG tablet TAKE 1 TABLET BY MOUTH TWO (2) TIMES A  tablet 1    atorvastatin (LIPITOR) 10 MG tablet Take 1 tablet by mouth daily 90 tablet 1     No current facility-administered medications for this visit.       Past Medical History:   Diagnosis Date    Acute respiratory failure with hypoxia (HCC) 2021    Anxiety     COVID-19 2021    Difficulty sleeping 2021    Dyspnea on exertion 2021    Hypercholesterolemia     Pneumonia due to COVID-19 virus 2021    Sepsis due to COVID-19 (HCC) 2021       Past Surgical History:   Procedure Laterality Date    APPENDECTOMY      HERNIA REPAIR         Social History     Tobacco Use    Smoking status: Former     Current packs/day: 0.00     Types: Cigarettes     Quit date: 2017     Years since quittin.5    Smokeless tobacco: Former    Tobacco comments:     Quit smoking: quit vaping 2021   Substance Use Topics    Alcohol use: Not Currently        Family History   Problem Relation Age of Onset    No Known Problems Mother     No Known Problems Father        Patient is here today for acute visit. Patient reports blurry vision in right eye for one week.  Denies double vision. Reports looks like film covering eye.           Review of Systems   Eyes:  Positive for visual disturbance. Negative for photophobia, pain, discharge, redness and itching.   Neurological:  Negative for dizziness, light-headedness and headaches.        /70   Pulse 91   Temp 98.5 °F (36.9 °C) (Oral)   Ht 1.727 m (5' 8\")   Wt 105.6 kg (232 lb 12.8 oz)   SpO2 98%   BMI 35.40 kg/m²     Physical Exam  Vitals reviewed.   Constitutional:

## 2024-03-27 NOTE — PERIOP NOTE
Patient verified name and .  Order for consent not found in EHR   Type 1b surgery, PAT phone assessment complete.  Orders not received.  Labs per surgeon: None  Labs per anesthesia protocol: None    Patient answered medical/surgical history questions at their best of ability. All prior to admission medications documented in EPIC.    Patient instructed to continue taking all prescription medications up to the day of surgery but to take only the following medications the day of surgery according to anesthesia guidelines with a small sip of water: Atorvastatin and Metoprolol.  Also, patient is requested to take 2 Tylenol in the morning and then again before bed on the day before surgery. Regular or extra strength may be used.       Patient informed that all vitamins and supplements should be held 7 days prior to surgery and NSAIDS 5 days prior to surgery. Prescription meds to hold:None    Patient instructed on the following:    > Arrive at A Entrance, time of arrival to be called the day before by 1700  > NPO after midnight, unless otherwise indicated, including gum, mints, and ice chips  > Responsible adult must drive patient to the hospital, stay during surgery, and patient will need supervision 24 hours after anesthesia  > Use non moisturizing soap in shower the night before surgery and on the morning of surgery  > All piercings must be removed prior to arrival.    > Leave all valuables (money and jewelry) at home but bring insurance card and ID on DOS.   > You may be required to pay a deductible or co-pay on the day of your procedure. You can pre-pay by calling [phone] if your surgery is at the [facility] or [phone] if your surgery is at the [facility].  > Do not wear make-up, nail polish, lotions, cologne, perfumes, powders, or oil on skin. Artificial nails are not permitted.

## 2024-04-01 ENCOUNTER — HOSPITAL ENCOUNTER (OUTPATIENT)
Age: 49
Setting detail: OUTPATIENT SURGERY
Discharge: HOME | End: 2024-04-01
Attending: SURGERY | Admitting: SURGERY
Payer: COMMERCIAL

## 2024-04-01 ENCOUNTER — ANESTHESIA (OUTPATIENT)
Dept: SURGERY | Age: 49
End: 2024-04-01
Payer: COMMERCIAL

## 2024-04-01 ENCOUNTER — ANESTHESIA EVENT (OUTPATIENT)
Dept: SURGERY | Age: 49
End: 2024-04-01
Payer: COMMERCIAL

## 2024-04-01 VITALS
TEMPERATURE: 98.3 F | BODY MASS INDEX: 35.09 KG/M2 | HEART RATE: 87 BPM | DIASTOLIC BLOOD PRESSURE: 74 MMHG | RESPIRATION RATE: 18 BRPM | WEIGHT: 231.5 LBS | OXYGEN SATURATION: 98 % | HEIGHT: 68 IN | SYSTOLIC BLOOD PRESSURE: 110 MMHG

## 2024-04-01 PROCEDURE — 2709999900 HC NON-CHARGEABLE SUPPLY: Performed by: SURGERY

## 2024-04-01 PROCEDURE — 7100000001 HC PACU RECOVERY - ADDTL 15 MIN: Performed by: SURGERY

## 2024-04-01 PROCEDURE — 3600000012 HC SURGERY LEVEL 2 ADDTL 15MIN: Performed by: SURGERY

## 2024-04-01 PROCEDURE — 6360000002 HC RX W HCPCS: Performed by: NURSE ANESTHETIST, CERTIFIED REGISTERED

## 2024-04-01 PROCEDURE — 6370000000 HC RX 637 (ALT 250 FOR IP): Performed by: ANESTHESIOLOGY

## 2024-04-01 PROCEDURE — 2500000003 HC RX 250 WO HCPCS: Performed by: NURSE ANESTHETIST, CERTIFIED REGISTERED

## 2024-04-01 PROCEDURE — 6360000002 HC RX W HCPCS: Performed by: SURGERY

## 2024-04-01 PROCEDURE — 2580000003 HC RX 258: Performed by: ANESTHESIOLOGY

## 2024-04-01 PROCEDURE — 11402 EXC TR-EXT B9+MARG 1.1-2 CM: CPT | Performed by: SURGERY

## 2024-04-01 PROCEDURE — 6370000000 HC RX 637 (ALT 250 FOR IP): Performed by: SURGERY

## 2024-04-01 PROCEDURE — 3700000001 HC ADD 15 MINUTES (ANESTHESIA): Performed by: SURGERY

## 2024-04-01 PROCEDURE — 2580000003 HC RX 258: Performed by: NURSE ANESTHETIST, CERTIFIED REGISTERED

## 2024-04-01 PROCEDURE — 3600000002 HC SURGERY LEVEL 2 BASE: Performed by: SURGERY

## 2024-04-01 PROCEDURE — 2720000010 HC SURG SUPPLY STERILE: Performed by: SURGERY

## 2024-04-01 PROCEDURE — 3700000000 HC ANESTHESIA ATTENDED CARE: Performed by: SURGERY

## 2024-04-01 PROCEDURE — 7100000010 HC PHASE II RECOVERY - FIRST 15 MIN: Performed by: SURGERY

## 2024-04-01 PROCEDURE — 7100000000 HC PACU RECOVERY - FIRST 15 MIN: Performed by: SURGERY

## 2024-04-01 PROCEDURE — 2580000003 HC RX 258: Performed by: SURGERY

## 2024-04-01 PROCEDURE — 7100000011 HC PHASE II RECOVERY - ADDTL 15 MIN: Performed by: SURGERY

## 2024-04-01 PROCEDURE — 88304 TISSUE EXAM BY PATHOLOGIST: CPT

## 2024-04-01 DEVICE — POWDER SURG CELLERATE RX 1 GM HYDROL COLLEGEN: Type: IMPLANTABLE DEVICE | Site: BACK | Status: FUNCTIONAL

## 2024-04-01 RX ORDER — SUCCINYLCHOLINE CHLORIDE 20 MG/ML
INJECTION INTRAMUSCULAR; INTRAVENOUS PRN
Status: DISCONTINUED | OUTPATIENT
Start: 2024-04-01 | End: 2024-04-01 | Stop reason: SDUPTHER

## 2024-04-01 RX ORDER — PROPOFOL 10 MG/ML
INJECTION, EMULSION INTRAVENOUS PRN
Status: DISCONTINUED | OUTPATIENT
Start: 2024-04-01 | End: 2024-04-01 | Stop reason: SDUPTHER

## 2024-04-01 RX ORDER — SODIUM CHLORIDE 0.9 % (FLUSH) 0.9 %
5-40 SYRINGE (ML) INJECTION EVERY 12 HOURS SCHEDULED
Status: DISCONTINUED | OUTPATIENT
Start: 2024-04-01 | End: 2024-04-01 | Stop reason: HOSPADM

## 2024-04-01 RX ORDER — SODIUM CHLORIDE 0.9 % (FLUSH) 0.9 %
5-40 SYRINGE (ML) INJECTION PRN
Status: DISCONTINUED | OUTPATIENT
Start: 2024-04-01 | End: 2024-04-01 | Stop reason: HOSPADM

## 2024-04-01 RX ORDER — DEXAMETHASONE SODIUM PHOSPHATE 10 MG/ML
INJECTION INTRAMUSCULAR; INTRAVENOUS PRN
Status: DISCONTINUED | OUTPATIENT
Start: 2024-04-01 | End: 2024-04-01 | Stop reason: SDUPTHER

## 2024-04-01 RX ORDER — ROCURONIUM BROMIDE 10 MG/ML
INJECTION, SOLUTION INTRAVENOUS PRN
Status: DISCONTINUED | OUTPATIENT
Start: 2024-04-01 | End: 2024-04-01 | Stop reason: SDUPTHER

## 2024-04-01 RX ORDER — MIDAZOLAM HYDROCHLORIDE 1 MG/ML
INJECTION INTRAMUSCULAR; INTRAVENOUS PRN
Status: DISCONTINUED | OUTPATIENT
Start: 2024-04-01 | End: 2024-04-01 | Stop reason: SDUPTHER

## 2024-04-01 RX ORDER — ACETAMINOPHEN 500 MG
1000 TABLET ORAL ONCE
Status: COMPLETED | OUTPATIENT
Start: 2024-04-01 | End: 2024-04-01

## 2024-04-01 RX ORDER — BUPIVACAINE HYDROCHLORIDE 5 MG/ML
INJECTION, SOLUTION EPIDURAL; INTRACAUDAL PRN
Status: DISCONTINUED | OUTPATIENT
Start: 2024-04-01 | End: 2024-04-01 | Stop reason: ALTCHOICE

## 2024-04-01 RX ORDER — NALOXONE HYDROCHLORIDE 0.4 MG/ML
INJECTION, SOLUTION INTRAMUSCULAR; INTRAVENOUS; SUBCUTANEOUS PRN
Status: DISCONTINUED | OUTPATIENT
Start: 2024-04-01 | End: 2024-04-01 | Stop reason: HOSPADM

## 2024-04-01 RX ORDER — GINSENG 100 MG
CAPSULE ORAL PRN
Status: DISCONTINUED | OUTPATIENT
Start: 2024-04-01 | End: 2024-04-01 | Stop reason: ALTCHOICE

## 2024-04-01 RX ORDER — MIDAZOLAM HYDROCHLORIDE 2 MG/2ML
2 INJECTION, SOLUTION INTRAMUSCULAR; INTRAVENOUS
Status: DISCONTINUED | OUTPATIENT
Start: 2024-04-01 | End: 2024-04-01 | Stop reason: HOSPADM

## 2024-04-01 RX ORDER — SODIUM CHLORIDE, SODIUM LACTATE, POTASSIUM CHLORIDE, CALCIUM CHLORIDE 600; 310; 30; 20 MG/100ML; MG/100ML; MG/100ML; MG/100ML
INJECTION, SOLUTION INTRAVENOUS CONTINUOUS
Status: DISCONTINUED | OUTPATIENT
Start: 2024-04-01 | End: 2024-04-01 | Stop reason: HOSPADM

## 2024-04-01 RX ORDER — FENTANYL CITRATE 50 UG/ML
INJECTION, SOLUTION INTRAMUSCULAR; INTRAVENOUS PRN
Status: DISCONTINUED | OUTPATIENT
Start: 2024-04-01 | End: 2024-04-01 | Stop reason: SDUPTHER

## 2024-04-01 RX ORDER — ONDANSETRON 2 MG/ML
4 INJECTION INTRAMUSCULAR; INTRAVENOUS
Status: DISCONTINUED | OUTPATIENT
Start: 2024-04-01 | End: 2024-04-01 | Stop reason: HOSPADM

## 2024-04-01 RX ORDER — ONDANSETRON 2 MG/ML
INJECTION INTRAMUSCULAR; INTRAVENOUS PRN
Status: DISCONTINUED | OUTPATIENT
Start: 2024-04-01 | End: 2024-04-01 | Stop reason: SDUPTHER

## 2024-04-01 RX ORDER — OXYCODONE HYDROCHLORIDE 5 MG/1
10 TABLET ORAL PRN
Status: COMPLETED | OUTPATIENT
Start: 2024-04-01 | End: 2024-04-01

## 2024-04-01 RX ORDER — CEFAZOLIN 1 G/1
INJECTION, POWDER, FOR SOLUTION INTRAVENOUS PRN
Status: DISCONTINUED | OUTPATIENT
Start: 2024-04-01 | End: 2024-04-01 | Stop reason: SDUPTHER

## 2024-04-01 RX ORDER — KETOROLAC TROMETHAMINE 30 MG/ML
INJECTION, SOLUTION INTRAMUSCULAR; INTRAVENOUS PRN
Status: DISCONTINUED | OUTPATIENT
Start: 2024-04-01 | End: 2024-04-01 | Stop reason: SDUPTHER

## 2024-04-01 RX ORDER — LIDOCAINE HYDROCHLORIDE 10 MG/ML
1 INJECTION, SOLUTION INFILTRATION; PERINEURAL
Status: DISCONTINUED | OUTPATIENT
Start: 2024-04-01 | End: 2024-04-01 | Stop reason: HOSPADM

## 2024-04-01 RX ORDER — SODIUM CHLORIDE 9 MG/ML
INJECTION, SOLUTION INTRAVENOUS CONTINUOUS
Status: DISCONTINUED | OUTPATIENT
Start: 2024-04-01 | End: 2024-04-01 | Stop reason: HOSPADM

## 2024-04-01 RX ORDER — HYDROMORPHONE HYDROCHLORIDE 1 MG/ML
0.5 INJECTION, SOLUTION INTRAMUSCULAR; INTRAVENOUS; SUBCUTANEOUS EVERY 10 MIN PRN
Status: DISCONTINUED | OUTPATIENT
Start: 2024-04-01 | End: 2024-04-01 | Stop reason: HOSPADM

## 2024-04-01 RX ORDER — LIDOCAINE HYDROCHLORIDE 20 MG/ML
INJECTION, SOLUTION EPIDURAL; INFILTRATION; INTRACAUDAL; PERINEURAL PRN
Status: DISCONTINUED | OUTPATIENT
Start: 2024-04-01 | End: 2024-04-01 | Stop reason: SDUPTHER

## 2024-04-01 RX ORDER — SODIUM CHLORIDE 9 MG/ML
INJECTION, SOLUTION INTRAVENOUS PRN
Status: DISCONTINUED | OUTPATIENT
Start: 2024-04-01 | End: 2024-04-01 | Stop reason: HOSPADM

## 2024-04-01 RX ORDER — FAMOTIDINE 20 MG/1
20 TABLET, FILM COATED ORAL ONCE
Status: COMPLETED | OUTPATIENT
Start: 2024-04-01 | End: 2024-04-01

## 2024-04-01 RX ORDER — OXYCODONE HYDROCHLORIDE 5 MG/1
5 TABLET ORAL PRN
Status: COMPLETED | OUTPATIENT
Start: 2024-04-01 | End: 2024-04-01

## 2024-04-01 RX ORDER — SODIUM CHLORIDE 9 MG/ML
INJECTION, SOLUTION INTRAVENOUS PRN
Status: DISCONTINUED | OUTPATIENT
Start: 2024-04-01 | End: 2024-04-01

## 2024-04-01 RX ORDER — DIPHENHYDRAMINE HYDROCHLORIDE 50 MG/ML
12.5 INJECTION INTRAMUSCULAR; INTRAVENOUS
Status: DISCONTINUED | OUTPATIENT
Start: 2024-04-01 | End: 2024-04-01 | Stop reason: HOSPADM

## 2024-04-01 RX ORDER — HYDROMORPHONE HYDROCHLORIDE 1 MG/ML
0.25 INJECTION, SOLUTION INTRAMUSCULAR; INTRAVENOUS; SUBCUTANEOUS EVERY 5 MIN PRN
Status: DISCONTINUED | OUTPATIENT
Start: 2024-04-01 | End: 2024-04-01 | Stop reason: HOSPADM

## 2024-04-01 RX ORDER — FENTANYL CITRATE 50 UG/ML
100 INJECTION, SOLUTION INTRAMUSCULAR; INTRAVENOUS
Status: DISCONTINUED | OUTPATIENT
Start: 2024-04-01 | End: 2024-04-01 | Stop reason: HOSPADM

## 2024-04-01 RX ADMIN — ROCURONIUM BROMIDE 5 MG: 10 INJECTION, SOLUTION INTRAVENOUS at 07:56

## 2024-04-01 RX ADMIN — SODIUM CHLORIDE, POTASSIUM CHLORIDE, SODIUM LACTATE AND CALCIUM CHLORIDE: 600; 310; 30; 20 INJECTION, SOLUTION INTRAVENOUS at 07:29

## 2024-04-01 RX ADMIN — DEXAMETHASONE SODIUM PHOSPHATE 4 MG: 10 INJECTION INTRAMUSCULAR; INTRAVENOUS at 08:08

## 2024-04-01 RX ADMIN — ONDANSETRON 4 MG: 2 INJECTION INTRAMUSCULAR; INTRAVENOUS at 08:08

## 2024-04-01 RX ADMIN — OXYCODONE 5 MG: 5 TABLET ORAL at 08:47

## 2024-04-01 RX ADMIN — KETOROLAC TROMETHAMINE 30 MG: 30 INJECTION, SOLUTION INTRAMUSCULAR; INTRAVENOUS at 08:19

## 2024-04-01 RX ADMIN — CEFAZOLIN 2000 MG: 1 INJECTION, POWDER, FOR SOLUTION INTRAVENOUS at 07:50

## 2024-04-01 RX ADMIN — Medication 160 MG: at 07:56

## 2024-04-01 RX ADMIN — LIDOCAINE HYDROCHLORIDE 100 MG: 20 INJECTION, SOLUTION EPIDURAL; INFILTRATION; INTRACAUDAL; PERINEURAL at 07:56

## 2024-04-01 RX ADMIN — FENTANYL CITRATE 50 MCG: 50 INJECTION, SOLUTION INTRAMUSCULAR; INTRAVENOUS at 08:04

## 2024-04-01 RX ADMIN — MIDAZOLAM 2 MG: 1 INJECTION INTRAMUSCULAR; INTRAVENOUS at 07:47

## 2024-04-01 RX ADMIN — CEFAZOLIN 2000 MG: 10 INJECTION, POWDER, FOR SOLUTION INTRAVENOUS at 07:23

## 2024-04-01 RX ADMIN — ACETAMINOPHEN 1000 MG: 500 TABLET, FILM COATED ORAL at 07:23

## 2024-04-01 RX ADMIN — FENTANYL CITRATE 50 MCG: 50 INJECTION, SOLUTION INTRAMUSCULAR; INTRAVENOUS at 07:56

## 2024-04-01 RX ADMIN — FAMOTIDINE 20 MG: 20 TABLET, FILM COATED ORAL at 07:23

## 2024-04-01 RX ADMIN — PROPOFOL 170 MG: 10 INJECTION, EMULSION INTRAVENOUS at 07:56

## 2024-04-01 RX ADMIN — PHENYLEPHRINE HYDROCHLORIDE 100 MCG: 10 INJECTION INTRAVENOUS at 08:14

## 2024-04-01 ASSESSMENT — PAIN DESCRIPTION - LOCATION: LOCATION: INCISION

## 2024-04-01 ASSESSMENT — PAIN SCALES - GENERAL
PAINLEVEL_OUTOF10: 0
PAINLEVEL_OUTOF10: 0
PAINLEVEL_OUTOF10: 4
PAINLEVEL_OUTOF10: 0

## 2024-04-01 ASSESSMENT — PAIN DESCRIPTION - DESCRIPTORS: DESCRIPTORS: BURNING;ACHING

## 2024-04-01 ASSESSMENT — PAIN - FUNCTIONAL ASSESSMENT: PAIN_FUNCTIONAL_ASSESSMENT: 0-10

## 2024-04-01 NOTE — H&P
CHIEF COMPLAINT: ready for surgery        HISTORY:  Underwent incision and drainage of an infected sebaceous cyst on his back on 2024.  Since that time he tells me that he feels better, the swelling is decreased, he has no drainage.  He is here for excision.        REVIEW OF SYSTEMS:  Review of Systems   Constitutional: Negative.    HENT: Negative.     Eyes: Negative.    Respiratory: Negative.     Cardiovascular: Negative.    Gastrointestinal: Negative.    Endocrine: Negative.    Genitourinary: Negative.    Musculoskeletal: Negative.    Skin: Negative.    Allergic/Immunologic: Negative.    Neurological: Negative.    Hematological: Negative.    Psychiatric/Behavioral: Negative.              Past Medical History        Past Medical History:   Diagnosis Date    Acute respiratory failure with hypoxia (HCC) 2021    COVID-19 2021    Difficulty sleeping 2021    Dyspnea on exertion 2021    Hypercholesterolemia      Pneumonia due to COVID-19 virus 2021    Sepsis due to COVID-19 (HCC) 2021            Current Facility-Administered Medications          Current Outpatient Medications   Medication Sig Dispense Refill    metoprolol tartrate (LOPRESSOR) 25 MG tablet TAKE 1 TABLET BY MOUTH TWO (2) TIMES A  tablet 1    atorvastatin (LIPITOR) 10 MG tablet Take 1 tablet by mouth daily 90 tablet 1      No current facility-administered medications for this visit.            Family History         Family History   Problem Relation Age of Onset    No Known Problems Mother      No Known Problems Father              Social History   Social History            Socioeconomic History    Marital status:        Spouse name: None    Number of children: None    Years of education: None    Highest education level: None   Tobacco Use    Smoking status: Former       Current packs/day: 0.00       Types: Cigarettes       Quit date: 2017       Years since quittin.4    Smokeless tobacco: Former

## 2024-04-01 NOTE — BRIEF OP NOTE
Brief Postoperative Note      Patient: Ran Adame Jr.  YOB: 1975  MRN: [id number]    Date of Procedure: 4/1/2024    Pre-Op Diagnosis Codes:     * Dermoid cyst of skin of back [D23.5]    Post-Op Diagnosis: Same       Procedure(s):  EXCISION OF BACK CYST    Surgeon(s):  Rickey Kam Jr., MD    Assistant:  * No surgical staff found *    Anesthesia: General    Estimated Blood Loss (mL): 5ml    Complications: None    Specimens:   ID Type Source Tests Collected by Time Destination   A : Back Mass Tissue Back SURGICAL PATHOLOGY Rickey Kam Jr., MD 4/1/2024 0812        Implants:  Implant Name Type Inv. Item Serial No.  Lot No. LRB No. Used Action   POWDER SURG CELLERATE RX 1 GM [facility] - [id number]  POWDER SURG CELLERATE RX 1 GM [facility]  [employer]  N/A 1 Implanted         Drains: * No LDAs found *    Findings: 1.2cm cyst on mid back    Electronically signed by Rickey Kam Jr, MD on 4/1/2024 at 8:26 AM

## 2024-04-01 NOTE — OP NOTE
Operative Note      Patient: Ran Adame Jr.  YOB: 1975  MRN: [id number]    Date of Procedure: 4/1/2024    Pre-Op Diagnosis Codes:     * Dermoid cyst of skin of back [D23.5]    Post-Op Diagnosis: Same       Procedure(s):  EXCISION OF BACK CYST    Surgeon(s):  Rickey Kam Jr., MD    Assistant:   * No surgical staff found *    Anesthesia: General    Estimated Blood Loss (mL): 5ml    Complications: None    Specimens:   ID Type Source Tests Collected by Time Destination   A : Back Mass Tissue Back SURGICAL PATHOLOGY Rickey Kam Jr., MD 4/1/2024 0812        Implants:  Implant Name Type Inv. Item Serial No.  Lot No. LRB No. Used Action   POWDER SURG CELLERATE RX 1 GM [facility] - [id number]  POWDER SURG CELLERATE RX 1 GM [facility]  [employer]  N/A 1 Implanted         Drains: * No LDAs found *    Findings: 1.2cm cyst on mid back        Detailed Description of Procedure:   Patient underwent informed consent with a review of the associated risks.  He was marked in the holding area then taken to the operating room and underwent anesthesia without complications.  Then placed left lateral on the operating table and secured and padded appropriately.  Back was prepped and draped in a sterile fashion.  The appropriate timeout was performed.  Ancef was given as prophylaxis prior to incision.  A cystic mass was identified in the mid back.  There was an open wound overlying.  There was no drainage.  I marked a site for incision in elliptical fashion to include the cystic mass as well as a small punctum more inferiorly.  The area was infiltrated with half percent Marcaine and a skin incision was made along the lamont with a scalpel.  Dissection was carried into the deeper tissues using cautery and a cystic mass was identified and dissected out of the surrounding tissues using cautery and removed in its entirety.  It measured 1.2 cm.  This was sent to pathology.  Hemostasis

## 2024-04-01 NOTE — ANESTHESIA PRE PROCEDURE
Department of Anesthesiology  Preprocedure Note       Name:  Ran Adame Jr.   Age:  48 y.o.  :  1975                                          MRN:  [id number]         Date:  2024      Surgeon: Surgeon(s):  Rickey Kam Jr., MD    Procedure: Procedure(s):  EXCISION OF BACK CYST/ FIRST ASSIST    Medications prior to admission:   Prior to Admission medications    Medication Sig Start Date End Date Taking? Authorizing Provider   metoprolol tartrate (LOPRESSOR) 25 MG tablet TAKE 1 TABLET BY MOUTH TWO (2) TIMES A DAY 23   Cheyenne Villalobos APRN - CNP   atorvastatin (LIPITOR) 10 MG tablet Take 1 tablet by mouth daily 23   Cheyenne Villalobos APRN - CNP       Current medications:    No current facility-administered medications for this encounter.     Current Outpatient Medications   Medication Sig Dispense Refill    metoprolol tartrate (LOPRESSOR) 25 MG tablet TAKE 1 TABLET BY MOUTH TWO (2) TIMES A  tablet 1    atorvastatin (LIPITOR) 10 MG tablet Take 1 tablet by mouth daily 90 tablet 1       Allergies:    Allergies   Allergen Reactions    Penicillins Hives       Problem List:    Patient Active Problem List   Diagnosis Code    Leukocytosis D72.829    Obesity (BMI 30.0-34.9) E66.9    Cigarette nicotine dependence in remission F17.211    Restrictive lung disease J98.4    Inappropriate sinus tachycardia (HCC) I47.11    Diarrhea R19.7    Post covid-19 condition, unspecified U09.9    History of nicotine vaping Z87.891    Dermoid cyst of skin of back D23.5       Past Medical History:        Diagnosis Date    Acute respiratory failure with hypoxia (HCC) 2021    Anxiety     COVID-19 2021    Difficulty sleeping 2021    Dyspnea on exertion 2021    Hypercholesterolemia     Pneumonia due to COVID-19 virus 2021    Sepsis due to COVID-19 (HCC) 2021       Past Surgical History:        Procedure Laterality Date    APPENDECTOMY      HERNIA REPAIR

## 2024-04-01 NOTE — PERIOP NOTE
MD River at bedside with patient. Pt VSS stable. Pain and Nausea controlled at this time. Verbal sign out per MD when pacu care is completed. Plan of care continues.

## 2024-04-01 NOTE — DISCHARGE INSTRUCTIONS
anesthesia and the narcotic pain medication.  Please try prune juice, Milk of Magnesia, a mild laxative such as Dulcolax, or a saline enema. If you experience diarrhea, avoid dairy products. If you have gas pain, use Mylicon or Gas-X.    Follow-up: Ideally we would like to see you in the office for a post-op visit 14-21 days following your surgery.  Please call the office sooner rather than later to ensure a convenient appointment time [phone]. Please schedule your appointment with either Dr. Kam or one of the PA/NPs helping with your surgery.     Please notify us if any of the following occur:  -Fever greater than 100.5F  -New or worsening pain that is not alleviated with pain medication  -Severe nausea and vomiting  -Develop a rash around your surgical site  -Any redness, swelling, and/ or drainage from your incision.  -Inability to urinate for 10 hours following your surgical procedure    We are here for you during your recovery.  If you have any questions please feel free to call our office  [phone]    MEDICATION INTERACTION:    During your procedure you potentially received a medication or medications which may reduce the effectiveness of oral contraceptives. Please consider other forms of contraception for 1 month following your procedure if you are currently using oral contraceptives as your primary form of birth control. In addition to this, we recommend continuing your oral contraceptive as prescribed, unless otherwise instructed by your physician, during this time.    After general anesthesia or intravenous sedation, for 24 hours or while taking prescription Narcotics:  Limit your activities  A responsible adult needs to be with you for the next 24 hours  Do not drive and operate hazardous machinery  Do not make important personal or business decisions  Do not drink alcoholic beverages  If you have not urinated within 8 hours after discharge, and you are experiencing discomfort from urinary

## 2024-04-01 NOTE — ANESTHESIA POSTPROCEDURE EVALUATION
Department of Anesthesiology  Postprocedure Note    Patient: Ran Adame Jr.  MRN: [id number]  YOB: 1975  Date of evaluation: 4/1/2024    Procedure Summary       Date: 04/01/24 [place] [facility] [place] [facility] [place] [facility] [place]    Anesthesia Start: 0750 Anesthesia Stop: 0832    Procedure: EXCISION OF BACK CYST (Back) Diagnosis:       Dermoid cyst of skin of back      (Dermoid cyst of skin of back [D23.5])    Surgeons: Rickey Kam Jr., MD Responsible Provider: Alissa River MD    Anesthesia Type: general ASA Status: 3            Anesthesia Type: No value filed.    Nazanin Phase I: Nazanin Score: 8    Nazanin Phase II:      Anesthesia Post Evaluation    Patient location during evaluation: PACU  Patient participation: complete - patient participated  Level of consciousness: awake and alert  Airway patency: patent  Nausea & Vomiting: no nausea  Cardiovascular status: hemodynamically stable  Respiratory status: acceptable  Hydration status: euvolemic  Pain management: adequate and satisfactory to patient        No notable events documented.

## 2024-04-17 ENCOUNTER — OFFICE VISIT (OUTPATIENT)
Dept: SURGERY | Age: 49
End: 2024-04-17

## 2024-04-17 DIAGNOSIS — R22.2 MASS OF SKIN OF BACK: Primary | ICD-10-CM

## 2024-04-17 PROCEDURE — 99024 POSTOP FOLLOW-UP VISIT: CPT | Performed by: SURGERY

## 2024-04-17 ASSESSMENT — ENCOUNTER SYMPTOMS
ALLERGIC/IMMUNOLOGIC NEGATIVE: 1
GASTROINTESTINAL NEGATIVE: 1
RESPIRATORY NEGATIVE: 1
EYES NEGATIVE: 1

## 2024-04-17 NOTE — PROGRESS NOTES
2024    Ran Adame  MRN: [id number]      CHIEF COMPLAINT: Doing well      PRIMARY CARE PHYSICIAN: Cheyenne Villalobos, APRN - CNP      HISTORY:  Had excision of a back mass with final pathology showing epidermal inclusion cyst.  He is healing well and has no complaints today.  Date Obtained:   2024   DIAGNOSIS       \"BACK MASS\":  EPIDERMAL INCLUSION CYST.       REVIEW OF SYSTEMS:  Review of Systems   Constitutional: Negative.    HENT: Negative.     Eyes: Negative.    Respiratory: Negative.     Cardiovascular: Negative.    Gastrointestinal: Negative.    Endocrine: Negative.    Genitourinary: Negative.    Musculoskeletal: Negative.    Skin: Negative.    Allergic/Immunologic: Negative.    Neurological: Negative.    Hematological: Negative.    Psychiatric/Behavioral: Negative.            Past Medical History:   Diagnosis Date    Acute respiratory failure with hypoxia (HCC) 2021    Anxiety     COVID-19 2021    Difficulty sleeping 2021    Dyspnea on exertion 2021    Hypercholesterolemia     Pneumonia due to COVID-19 virus 2021    Sepsis due to COVID-19 (HCC) 2021       Current Outpatient Medications   Medication Sig Dispense Refill    metoprolol tartrate (LOPRESSOR) 25 MG tablet TAKE 1 TABLET BY MOUTH TWO (2) TIMES A  tablet 1    atorvastatin (LIPITOR) 10 MG tablet Take 1 tablet by mouth daily 90 tablet 1     No current facility-administered medications for this visit.       Family History   Problem Relation Age of Onset    No Known Problems Mother     No Known Problems Father        Social History     Socioeconomic History    Marital status:      Spouse name: None    Number of children: None    Years of education: None    Highest education level: None   Tobacco Use    Smoking status: Former     Current packs/day: 0.00     Types: Cigarettes     Quit date: 2017     Years since quittin.6    Smokeless tobacco: Former    Tobacco comments:

## 2024-04-18 DIAGNOSIS — E78.2 MIXED HYPERLIPIDEMIA: ICD-10-CM

## 2024-04-18 RX ORDER — ATORVASTATIN CALCIUM 10 MG/1
10 TABLET, FILM COATED ORAL DAILY
Qty: 30 TABLET | Refills: 5 | OUTPATIENT
Start: 2024-04-18

## 2024-04-29 ENCOUNTER — TELEPHONE (OUTPATIENT)
Dept: FAMILY MEDICINE CLINIC | Facility: CLINIC | Age: 49
End: 2024-04-29

## 2024-04-29 NOTE — TELEPHONE ENCOUNTER
Patient is having eye surgery and will not make it to appointment this week, but is requesting refill on his lipitor . He rescheduled May 30. Uses pharmacy on file. Please advise

## 2024-04-30 DIAGNOSIS — E78.2 MIXED HYPERLIPIDEMIA: ICD-10-CM

## 2024-04-30 RX ORDER — ATORVASTATIN CALCIUM 10 MG/1
10 TABLET, FILM COATED ORAL DAILY
Qty: 90 TABLET | Refills: 1 | Status: SHIPPED | OUTPATIENT
Start: 2024-04-30

## 2024-05-03 ENCOUNTER — TELEPHONE (OUTPATIENT)
Dept: FAMILY MEDICINE CLINIC | Facility: CLINIC | Age: 49
End: 2024-05-03

## 2024-05-03 NOTE — TELEPHONE ENCOUNTER
SF schedule radiology pre-authorization insurance called states insurance is requiring a peer to peer for MRi  Peer to peers number  [phone] case [id number]    [facility] [phone] [facility] Scheduling pre authorization you can call if you need any more information

## 2024-08-05 ENCOUNTER — COMMUNITY OUTREACH (OUTPATIENT)
Dept: FAMILY MEDICINE CLINIC | Facility: CLINIC | Age: 49
End: 2024-08-05

## 2024-09-02 DIAGNOSIS — R00.0 TACHYCARDIA, UNSPECIFIED: ICD-10-CM

## 2024-09-03 RX ORDER — METOPROLOL TARTRATE 25 MG/1
TABLET, FILM COATED ORAL
Qty: 180 TABLET | Refills: 1 | OUTPATIENT
Start: 2024-09-03

## 2024-09-04 DIAGNOSIS — R00.0 TACHYCARDIA, UNSPECIFIED: ICD-10-CM

## 2024-09-04 DIAGNOSIS — E78.2 MIXED HYPERLIPIDEMIA: ICD-10-CM

## 2024-09-04 RX ORDER — METOPROLOL TARTRATE 25 MG/1
TABLET, FILM COATED ORAL
Qty: 180 TABLET | Refills: 1 | Status: SHIPPED | OUTPATIENT
Start: 2024-09-04

## 2024-09-04 RX ORDER — ATORVASTATIN CALCIUM 10 MG/1
10 TABLET, FILM COATED ORAL DAILY
Qty: 90 TABLET | Refills: 1 | OUTPATIENT
Start: 2024-09-04

## 2024-09-04 NOTE — TELEPHONE ENCOUNTER
Could not refill Atorvastatin. It was \"in use\" by patient's PCP. It would not let me in at all. Stated that it was pended by another user.

## 2024-11-22 DIAGNOSIS — E78.2 MIXED HYPERLIPIDEMIA: ICD-10-CM

## 2024-11-25 RX ORDER — ATORVASTATIN CALCIUM 10 MG/1
10 TABLET, FILM COATED ORAL DAILY
Qty: 90 TABLET | Refills: 1 | OUTPATIENT
Start: 2024-11-25

## 2025-01-14 ENCOUNTER — OFFICE VISIT (OUTPATIENT)
Dept: FAMILY MEDICINE CLINIC | Facility: CLINIC | Age: 50
End: 2025-01-14
Payer: COMMERCIAL

## 2025-01-14 VITALS
DIASTOLIC BLOOD PRESSURE: 78 MMHG | BODY MASS INDEX: 35.58 KG/M2 | HEART RATE: 94 BPM | WEIGHT: 234 LBS | OXYGEN SATURATION: 96 % | SYSTOLIC BLOOD PRESSURE: 133 MMHG

## 2025-01-14 DIAGNOSIS — I10 PRIMARY HYPERTENSION: ICD-10-CM

## 2025-01-14 DIAGNOSIS — R73.09 ELEVATED GLUCOSE: ICD-10-CM

## 2025-01-14 DIAGNOSIS — E78.2 MIXED HYPERLIPIDEMIA: ICD-10-CM

## 2025-01-14 DIAGNOSIS — R35.1 NOCTURIA: ICD-10-CM

## 2025-01-14 DIAGNOSIS — H00.014 HORDEOLUM EXTERNUM OF LEFT UPPER EYELID: Primary | ICD-10-CM

## 2025-01-14 LAB
ALBUMIN SERPL-MCNC: 4.1 G/DL (ref 3.5–5)
ALBUMIN/GLOB SERPL: 1.2 (ref 1–1.9)
ALP SERPL-CCNC: 104 U/L (ref 40–129)
ALT SERPL-CCNC: 37 U/L (ref 8–55)
ANION GAP SERPL CALC-SCNC: 12 MMOL/L (ref 7–16)
AST SERPL-CCNC: 38 U/L (ref 15–37)
BASOPHILS # BLD: 0.04 K/UL (ref 0–0.2)
BASOPHILS NFR BLD: 0.4 % (ref 0–2)
BILIRUB SERPL-MCNC: 0.4 MG/DL (ref 0–1.2)
BUN SERPL-MCNC: 11 MG/DL (ref 6–23)
CALCIUM SERPL-MCNC: 9.4 MG/DL (ref 8.8–10.2)
CHLORIDE SERPL-SCNC: 100 MMOL/L (ref 98–107)
CHOLEST SERPL-MCNC: 257 MG/DL (ref 0–200)
CK SERPL-CCNC: 80 U/L (ref 21–215)
CO2 SERPL-SCNC: 26 MMOL/L (ref 20–29)
CREAT SERPL-MCNC: 1.14 MG/DL (ref 0.8–1.3)
DIFFERENTIAL METHOD BLD: ABNORMAL
EOSINOPHIL # BLD: 0.13 K/UL (ref 0–0.8)
EOSINOPHIL NFR BLD: 1.2 % (ref 0.5–7.8)
ERYTHROCYTE [DISTWIDTH] IN BLOOD BY AUTOMATED COUNT: 13.5 % (ref 11.9–14.6)
EST. AVERAGE GLUCOSE BLD GHB EST-MCNC: 109 MG/DL
GLOBULIN SER CALC-MCNC: 3.4 G/DL (ref 2.3–3.5)
GLUCOSE SERPL-MCNC: 103 MG/DL (ref 70–99)
HBA1C MFR BLD: 5.4 % (ref 0–5.6)
HCT VFR BLD AUTO: 52.7 % (ref 41.1–50.3)
HDLC SERPL-MCNC: 56 MG/DL (ref 40–60)
HDLC SERPL: 4.6 (ref 0–5)
HGB BLD-MCNC: 17.5 G/DL (ref 13.6–17.2)
IMM GRANULOCYTES # BLD AUTO: 0.04 K/UL (ref 0–0.5)
IMM GRANULOCYTES NFR BLD AUTO: 0.4 % (ref 0–5)
LDLC SERPL CALC-MCNC: 168 MG/DL (ref 0–100)
LYMPHOCYTES # BLD: 1.37 K/UL (ref 0.5–4.6)
LYMPHOCYTES NFR BLD: 12.8 % (ref 13–44)
MCH RBC QN AUTO: 29.3 PG (ref 26.1–32.9)
MCHC RBC AUTO-ENTMCNC: 33.2 G/DL (ref 31.4–35)
MCV RBC AUTO: 88.1 FL (ref 82–102)
MONOCYTES # BLD: 0.86 K/UL (ref 0.1–1.3)
MONOCYTES NFR BLD: 8 % (ref 4–12)
NEUTS SEG # BLD: 8.26 K/UL (ref 1.7–8.2)
NEUTS SEG NFR BLD: 77.2 % (ref 43–78)
NRBC # BLD: 0 K/UL (ref 0–0.2)
PLATELET # BLD AUTO: 283 K/UL (ref 150–450)
PMV BLD AUTO: 10.3 FL (ref 9.4–12.3)
POTASSIUM SERPL-SCNC: 4.6 MMOL/L (ref 3.5–5.1)
PROT SERPL-MCNC: 7.4 G/DL (ref 6.3–8.2)
PSA SERPL-MCNC: 0.3 NG/ML (ref 0–4)
RBC # BLD AUTO: 5.98 M/UL (ref 4.23–5.6)
SODIUM SERPL-SCNC: 138 MMOL/L (ref 136–145)
TRIGL SERPL-MCNC: 164 MG/DL (ref 0–150)
VLDLC SERPL CALC-MCNC: 33 MG/DL (ref 6–23)
WBC # BLD AUTO: 10.7 K/UL (ref 4.3–11.1)

## 2025-01-14 PROCEDURE — 3075F SYST BP GE 130 - 139MM HG: CPT | Performed by: NURSE PRACTITIONER

## 2025-01-14 PROCEDURE — 99214 OFFICE O/P EST MOD 30 MIN: CPT | Performed by: NURSE PRACTITIONER

## 2025-01-14 PROCEDURE — 3078F DIAST BP <80 MM HG: CPT | Performed by: NURSE PRACTITIONER

## 2025-01-14 RX ORDER — DOXYCYCLINE HYCLATE 100 MG
100 TABLET ORAL 2 TIMES DAILY
Qty: 14 TABLET | Refills: 0 | Status: SHIPPED | OUTPATIENT
Start: 2025-01-14 | End: 2025-01-21

## 2025-01-14 RX ORDER — METOPROLOL TARTRATE 25 MG/1
TABLET, FILM COATED ORAL
Qty: 180 TABLET | Refills: 1 | Status: SHIPPED | OUTPATIENT
Start: 2025-01-14

## 2025-01-14 RX ORDER — ATORVASTATIN CALCIUM 10 MG/1
10 TABLET, FILM COATED ORAL DAILY
Qty: 90 TABLET | Refills: 1 | Status: SHIPPED | OUTPATIENT
Start: 2025-01-14

## 2025-01-14 SDOH — ECONOMIC STABILITY: FOOD INSECURITY: WITHIN THE PAST 12 MONTHS, THE FOOD YOU BOUGHT JUST DIDN'T LAST AND YOU DIDN'T HAVE MONEY TO GET MORE.: PATIENT DECLINED

## 2025-01-14 SDOH — ECONOMIC STABILITY: FOOD INSECURITY: WITHIN THE PAST 12 MONTHS, YOU WORRIED THAT YOUR FOOD WOULD RUN OUT BEFORE YOU GOT MONEY TO BUY MORE.: PATIENT DECLINED

## 2025-01-14 ASSESSMENT — ENCOUNTER SYMPTOMS
ALLERGIC/IMMUNOLOGIC NEGATIVE: 1
EYE PAIN: 1
GASTROINTESTINAL NEGATIVE: 1
EYE ITCHING: 1
EYE REDNESS: 1
RESPIRATORY NEGATIVE: 1
EYE DISCHARGE: 1

## 2025-01-14 ASSESSMENT — PATIENT HEALTH QUESTIONNAIRE - PHQ9
SUM OF ALL RESPONSES TO PHQ QUESTIONS 1-9: 0
SUM OF ALL RESPONSES TO PHQ9 QUESTIONS 1 & 2: 0
SUM OF ALL RESPONSES TO PHQ QUESTIONS 1-9: 0
1. LITTLE INTEREST OR PLEASURE IN DOING THINGS: NOT AT ALL
2. FEELING DOWN, DEPRESSED OR HOPELESS: NOT AT ALL
SUM OF ALL RESPONSES TO PHQ QUESTIONS 1-9: 0
SUM OF ALL RESPONSES TO PHQ QUESTIONS 1-9: 0

## 2025-01-14 NOTE — PROGRESS NOTES
DAY, Disp: 180 tablet, Rfl: 1    Review of Systems   Constitutional: Negative.    HENT: Negative.     Eyes:  Positive for pain, discharge, redness, itching and visual disturbance.   Respiratory: Negative.     Cardiovascular: Negative.    Gastrointestinal: Negative.    Endocrine: Negative.    Genitourinary:         Nocturia     Musculoskeletal: Negative.    Skin: Negative.    Allergic/Immunologic: Negative.    Neurological: Negative.    Hematological: Negative.    Psychiatric/Behavioral: Negative.          Vitals:    01/14/25 1554   BP: 133/78   Pulse: 94   SpO2: 96%        Physical Exam  Constitutional:       Appearance: Normal appearance.   HENT:      Head: Normocephalic.      Nose: Nose normal.   Eyes:      Extraocular Movements: Extraocular movements intact.      Comments: Pt has a hordeolum on left eyelid that is inflamed and very irritating. Left lid is swollen and tender to touch.   Pulmonary:      Effort: Pulmonary effort is normal.   Musculoskeletal:         General: Normal range of motion.      Cervical back: Normal range of motion and neck supple.   Skin:     General: Skin is warm and dry.   Neurological:      General: No focal deficit present.      Mental Status: He is alert and oriented to person, place, and time.   Psychiatric:         Mood and Affect: Mood normal.         Behavior: Behavior normal.                1/14/2025     3:56 PM 3/6/2024    10:46 AM 11/1/2023     1:17 PM 9/20/2022    11:11 AM 9/9/2022     3:42 PM 7/26/2022     1:06 PM 3/17/2022     1:32 PM   PHQ Scores   PHQ2 Score 0 0 0 0 0 0    PHQ2 Score       0   PHQ9 Score 0 0 0 0 0 0         Assessment & Plan:    1. Hordeolum externum of left upper eyelid  Warm compresses to eye. Do not touch eye/lid with fingers  - doxycycline hyclate (VIBRA-TABS) 100 MG tablet; Take 1 tablet by mouth 2 times daily for 7 days  Dispense: 14 tablet; Refill: 0    2. Mixed hyperlipidemia  stable  - atorvastatin (LIPITOR) 10 MG tablet; Take 1 tablet by mouth

## 2025-01-15 NOTE — RESULT ENCOUNTER NOTE
Results to patient please.  Total cholesterol is 257, triglycerides 164, HDL is 56 and LDL is 168.  Has patient missed any of his Lipitor?  If not, will increase dose to 20 mg p.o. daily.  If so, please let me know.  Please still encourage a low-cholesterol diet.  Electrolytes within normal limits.  Glucose is 103.  AST is elevated slightly, will recheck this at follow-up.  Hemoglobin is slightly elevated.  A1c is 5.4.  PSA is within normal limits.  Thanks
4

## 2025-07-05 DIAGNOSIS — E78.2 MIXED HYPERLIPIDEMIA: ICD-10-CM

## 2025-07-09 ENCOUNTER — TELEPHONE (OUTPATIENT)
Dept: FAMILY MEDICINE CLINIC | Facility: CLINIC | Age: 50
End: 2025-07-09

## 2025-07-09 DIAGNOSIS — E78.2 MIXED HYPERLIPIDEMIA: ICD-10-CM

## 2025-07-09 RX ORDER — ATORVASTATIN CALCIUM 10 MG/1
10 TABLET, FILM COATED ORAL DAILY
Qty: 90 TABLET | Refills: 1 | Status: SHIPPED | OUTPATIENT
Start: 2025-07-09

## 2025-07-09 RX ORDER — ATORVASTATIN CALCIUM 10 MG/1
10 TABLET, FILM COATED ORAL DAILY
Qty: 90 TABLET | Refills: 1 | OUTPATIENT
Start: 2025-07-09

## 2025-07-09 NOTE — TELEPHONE ENCOUNTER
Patient is needing a refill for atorvastatin (LIPITOR) 10 MG tablet [[id number]]  sent to [facility]/pharmacy #[id number] - [place] - [address] - P [phone] - F [phone]  [address] [place] [address]  Phone: [phone]  Fax: [phone]     Last appt 1/14/2025  Next appt 8/12/2025

## (undated) DEVICE — MASTISOL ADHESIVE LIQ 2/3ML

## (undated) DEVICE — GLOVE SURG SZ 65 THK91MIL LTX FREE SYN POLYISOPRENE

## (undated) DEVICE — TUBING INSUFFLATION SMK EVAC HI FLO SET PNEUMOCLEAR

## (undated) DEVICE — DISPOSABLE GRASPER CARTRIDGE: Brand: DIRECT DRIVE REPOSABLE GRASPERS

## (undated) DEVICE — LOGICUT SCISSOR LENGTH 320MM: Brand: LOGI - LAPAROSCOPIC INSTRUMENT SYSTEM

## (undated) DEVICE — GENERAL LAPAROSCOPY: Brand: MEDLINE INDUSTRIES, INC.

## (undated) DEVICE — STAPLER ENDOSCP 5MM ABS FIX DEV W/ 30 TACKS DISP

## (undated) DEVICE — SUTURE SZ 0 27IN 5/8 CIR UR-6  TAPER PT VIOLET ABSRB VICRYL J603H

## (undated) DEVICE — GLOVE SURG SZ 7 L12IN FNGR THK79MIL GRN LTX FREE

## (undated) DEVICE — E-Z CLEAN, NON-STICK, PTFE COATED, MEGA FINE ELECTROSURGICAL NEEDLE ELECTRODE, SHARP, 2.5 INCH (6.3 CM): Brand: MEGADYNE

## (undated) DEVICE — SURGICAL PROCEDURE PACK BASIC ST FRANCIS

## (undated) DEVICE — NEEDLE HYPO 21GA L1.5IN INTRAMUSCULAR S STL LATCH BVL UP

## (undated) DEVICE — SUTURE MCRYL SZ 3-0 L27IN ABSRB UD L19MM PS-2 3/8 CIR PRIM Y427H

## (undated) DEVICE — TROCAR: Brand: KII® SLEEVE

## (undated) DEVICE — CANISTER, RIGID, 2000CC: Brand: MEDLINE INDUSTRIES, INC.

## (undated) DEVICE — APPLIER CLP M/L SHFT DIA5MM 15 LIG LIGAMAX 5

## (undated) DEVICE — STRIP,CLOSURE,WOUND,MEDI-STRIP,1/2X4: Brand: MEDLINE

## (undated) DEVICE — SHEET, T, LAPAROTOMY, STERILE: Brand: MEDLINE

## (undated) DEVICE — GLOVE SURG SZ 75 L12IN FNGR THK79MIL GRN LTX FREE

## (undated) DEVICE — TROCAR: Brand: KII FIOS FIRST ENTRY

## (undated) DEVICE — SUTURE ETHLN SZ 3-0 L18IN NONABSORBABLE BLK PS-2 L19MM 3/8 1669H

## (undated) DEVICE — GARMENT,MEDLINE,DVT,INT,CALF,LG, GEN2: Brand: MEDLINE

## (undated) DEVICE — ELECTRODE PT RET AD L9FT HI MOIST COND ADH HYDRGEL CORDED

## (undated) DEVICE — DEVICE FIX 5MM TI FOR LAP HERN REP PROTACK

## (undated) DEVICE — SUTURE ABSORBABLE MONOFILAMENT 3-0 PS-2 27 IN UD MONOCRYL + SXMP1B109

## (undated) DEVICE — TROCARS: Brand: KII® BALLOON BLUNT TIP SYSTEM

## (undated) DEVICE — DISSECTOR ENDOSCP PREPERI KID SHP DISTENSION BLLN SPCMKR

## (undated) DEVICE — Z DISC USE 2220190 SUTURE VCRL SZ 3-0 L27IN ABSRB UD L26MM SH 1/2 CIR J416H

## (undated) DEVICE — APPLICATOR MEDICATED 26 CC SOLUTION HI LT ORNG CHLORAPREP